# Patient Record
Sex: FEMALE | Race: WHITE | NOT HISPANIC OR LATINO | Employment: UNEMPLOYED | URBAN - METROPOLITAN AREA
[De-identification: names, ages, dates, MRNs, and addresses within clinical notes are randomized per-mention and may not be internally consistent; named-entity substitution may affect disease eponyms.]

---

## 2017-11-21 ENCOUNTER — HISTORICAL (OUTPATIENT)
Dept: LAB | Facility: HOSPITAL | Age: 57
End: 2017-11-21

## 2017-11-21 LAB
ALBUMIN SERPL-MCNC: 4.1 GM/DL (ref 3.4–5)
ALBUMIN/GLOB SERPL: 1.1 RATIO (ref 1.1–2)
ALP SERPL-CCNC: 95 UNIT/L (ref 46–116)
ALT SERPL-CCNC: 129 UNIT/L (ref 12–78)
AST SERPL-CCNC: 222 UNIT/L (ref 10–37)
BILIRUB SERPL-MCNC: 0.5 MG/DL (ref 0.2–1)
BILIRUBIN DIRECT+TOT PNL SERPL-MCNC: 0.16 MG/DL (ref 0–0.2)
BILIRUBIN DIRECT+TOT PNL SERPL-MCNC: 0.31 MG/DL
BUN SERPL-MCNC: 7 MG/DL (ref 7–18)
CALCIUM SERPL-MCNC: 9.2 MG/DL (ref 8.5–10.1)
CHLORIDE SERPL-SCNC: 94 MMOL/L (ref 98–107)
CHOLEST SERPL-MCNC: 291 MG/DL (ref 50–200)
CHOLEST/HDLC SERPL: 2 {RATIO} (ref 0–5)
CO2 SERPL-SCNC: 22.6 MMOL/L (ref 21–32)
CREAT SERPL-MCNC: 1.07 MG/DL (ref 0.55–1.02)
GLOBULIN SER-MCNC: 3.8 GM/DL (ref 2.4–3.5)
GLUCOSE SERPL-MCNC: 144 MG/DL (ref 74–106)
HDLC SERPL-MCNC: 158 MG/DL (ref 35–60)
LDLC SERPL CALC-MCNC: 112 MG/DL (ref 50–140)
POTASSIUM SERPL-SCNC: 4.2 MMOL/L (ref 3.5–5.1)
PROT SERPL-MCNC: 7.9 GM/DL (ref 6.4–8.2)
SODIUM SERPL-SCNC: 132 MMOL/L (ref 136–145)
TRIGL SERPL-MCNC: 105 MG/DL (ref 30–150)
TSH SERPL-ACNC: 2.83 MIU/ML (ref 0.35–3.75)
VLDLC SERPL CALC-MCNC: 21 MG/DL

## 2017-11-24 ENCOUNTER — HISTORICAL (OUTPATIENT)
Dept: RADIOLOGY | Facility: HOSPITAL | Age: 57
End: 2017-11-24

## 2017-11-24 LAB
ABS NEUT (OLG): 6.13
ALBUMIN SERPL-MCNC: 3.9 GM/DL (ref 3.4–5)
ALP SERPL-CCNC: 100 UNIT/L (ref 46–116)
ALT SERPL-CCNC: 125 UNIT/L (ref 12–78)
AST SERPL-CCNC: 137 UNIT/L (ref 10–37)
BASOPHILS # BLD AUTO: 0.02 X10(3)/MCL
BASOPHILS NFR BLD AUTO: 0.2 %
BILIRUB SERPL-MCNC: 0.7 MG/DL (ref 0.2–1)
BILIRUBIN DIRECT+TOT PNL SERPL-MCNC: 0.24 MG/DL (ref 0–0.2)
BILIRUBIN DIRECT+TOT PNL SERPL-MCNC: 0.44 MG/DL
EOSINOPHIL # BLD AUTO: 0.06 X10(3)/MCL
EOSINOPHIL NFR BLD AUTO: 0.6 %
ERYTHROCYTE [DISTWIDTH] IN BLOOD BY AUTOMATED COUNT: 14 %
EST. AVERAGE GLUCOSE BLD GHB EST-MCNC: 91 MG/DL
HAV IGM SERPL QL IA: NEGATIVE
HBA1C MFR BLD: 4.8 % (ref 4.5–6.2)
HBV CORE IGM SERPL QL IA: NEGATIVE
HBV SURFACE AG SERPL QL IA: NEGATIVE
HCT VFR BLD AUTO: 38.7 % (ref 34–46)
HCV AB SERPL QL IA: NEGATIVE
HEPATITIS PANEL INTERP: NORMAL
HGB BLD-MCNC: 13.9 GM/DL (ref 11.3–15.4)
IMM GRANULOCYTES # BLD AUTO: 0.02 10*3/UL (ref 0–0.1)
IMM GRANULOCYTES NFR BLD AUTO: 0.2 % (ref 0–1)
LYMPHOCYTES # BLD AUTO: 2.32 X10(3)/MCL
LYMPHOCYTES NFR BLD AUTO: 24.1 %
MCH RBC QN AUTO: 35.9 PG (ref 27–33)
MCHC RBC AUTO-ENTMCNC: 35.9 GM/DL (ref 32–35)
MCV RBC AUTO: 100 FL (ref 81–97)
MONOCYTES # BLD AUTO: 1.09 X10(3)/MCL
MONOCYTES NFR BLD AUTO: 11.3 %
NEUTROPHILS # BLD AUTO: 6.13 X10(3)/MCL
NEUTROPHILS NFR BLD AUTO: 63.6 %
PLATELET # BLD AUTO: 252 X10(3)/MCL (ref 151–368)
PMV BLD AUTO: 10 FL
PROT SERPL-MCNC: 7.4 GM/DL (ref 6.4–8.2)
RBC # BLD AUTO: 3.87 X10(6)/MCL (ref 3.9–5)
WBC # SPEC AUTO: 9.64 X10(3)/MCL (ref 3.4–9.2)

## 2017-11-30 ENCOUNTER — HISTORICAL (OUTPATIENT)
Dept: LAB | Facility: HOSPITAL | Age: 57
End: 2017-11-30

## 2017-11-30 LAB
FERRITIN SERPL-MCNC: 362.9 NG/ML (ref 8–388)
TRANSFERRIN SERPL-MCNC: 191 MG/DL (ref 200–360)

## 2018-01-08 ENCOUNTER — HISTORICAL (OUTPATIENT)
Dept: RADIOLOGY | Facility: HOSPITAL | Age: 58
End: 2018-01-08

## 2018-01-08 LAB
ABS NEUT (OLG): 6.25
ALBUMIN SERPL-MCNC: 3.6 GM/DL (ref 3.4–5)
ALBUMIN/GLOB SERPL: 0.9 RATIO (ref 1.1–2)
ALP SERPL-CCNC: 77 UNIT/L (ref 46–116)
ALT SERPL-CCNC: 49 UNIT/L (ref 12–78)
AST SERPL-CCNC: 66 UNIT/L (ref 10–37)
BASOPHILS # BLD AUTO: 0.03 X10(3)/MCL
BASOPHILS NFR BLD AUTO: 0.4 %
BILIRUB SERPL-MCNC: 0.3 MG/DL (ref 0.2–1)
BILIRUBIN DIRECT+TOT PNL SERPL-MCNC: 0.14 MG/DL (ref 0–0.2)
BILIRUBIN DIRECT+TOT PNL SERPL-MCNC: 0.17 MG/DL
BUN SERPL-MCNC: 12 MG/DL (ref 7–18)
CALCIUM SERPL-MCNC: 9.2 MG/DL (ref 8.5–10.1)
CHLORIDE SERPL-SCNC: 82 MMOL/L (ref 98–107)
CO2 SERPL-SCNC: 24 MMOL/L (ref 21–32)
CREAT SERPL-MCNC: 1.08 MG/DL (ref 0.55–1.02)
EOSINOPHIL # BLD AUTO: 0 X10(3)/MCL
EOSINOPHIL NFR BLD AUTO: 0 %
ERYTHROCYTE [DISTWIDTH] IN BLOOD BY AUTOMATED COUNT: 12 %
GLOBULIN SER-MCNC: 3.9 GM/DL (ref 2.4–3.5)
GLUCOSE SERPL-MCNC: 135 MG/DL (ref 74–106)
HCT VFR BLD AUTO: 40.4 % (ref 34–46)
HGB BLD-MCNC: 14.7 GM/DL (ref 11.3–15.4)
IMM GRANULOCYTES # BLD AUTO: 0.02 10*3/UL (ref 0–0.1)
IMM GRANULOCYTES NFR BLD AUTO: 0.2 % (ref 0–1)
INFLUENZA A ANTIGEN, POC: NEGATIVE
INFLUENZA B ANTIGEN, POC: NEGATIVE
LYMPHOCYTES # BLD AUTO: 1.01 X10(3)/MCL
LYMPHOCYTES NFR BLD AUTO: 12.2 %
MCH RBC QN AUTO: 35.5 PG (ref 27–33)
MCHC RBC AUTO-ENTMCNC: 36.4 GM/DL (ref 32–35)
MCV RBC AUTO: 97.6 FL (ref 81–97)
MONOCYTES # BLD AUTO: 0.99 X10(3)/MCL
MONOCYTES NFR BLD AUTO: 11.9 %
NEUTROPHILS # BLD AUTO: 6.25 X10(3)/MCL
NEUTROPHILS NFR BLD AUTO: 75.3 %
PLATELET # BLD AUTO: 135 X10(3)/MCL (ref 151–368)
PMV BLD AUTO: 11 FL
POTASSIUM SERPL-SCNC: 3.6 MMOL/L (ref 3.5–5.1)
PROT SERPL-MCNC: 7.5 GM/DL (ref 6.4–8.2)
RBC # BLD AUTO: 4.14 X10(6)/MCL (ref 3.9–5)
SODIUM SERPL-SCNC: 118 MMOL/L (ref 136–145)
VIT B12 SERPL-MCNC: 730 PG/ML (ref 193–986)
WBC # SPEC AUTO: 8.3 X10(3)/MCL (ref 3.4–9.2)

## 2018-01-09 ENCOUNTER — HISTORICAL (OUTPATIENT)
Dept: LAB | Facility: HOSPITAL | Age: 58
End: 2018-01-09

## 2018-01-09 LAB
BUN SERPL-MCNC: 8 MG/DL (ref 7–18)
CALCIUM SERPL-MCNC: 8.7 MG/DL (ref 8.5–10.1)
CHLORIDE SERPL-SCNC: 94 MMOL/L (ref 98–107)
CO2 SERPL-SCNC: 22.2 MMOL/L (ref 21–32)
CREAT SERPL-MCNC: 0.9 MG/DL (ref 0.55–1.02)
GLUCOSE SERPL-MCNC: 130 MG/DL (ref 74–106)
POTASSIUM SERPL-SCNC: 3.7 MMOL/L (ref 3.5–5.1)
SODIUM SERPL-SCNC: 128 MMOL/L (ref 136–145)

## 2018-01-11 ENCOUNTER — HISTORICAL (OUTPATIENT)
Dept: LAB | Facility: HOSPITAL | Age: 58
End: 2018-01-11

## 2018-01-11 LAB
ABS NEUT (OLG): 6.15
BUN SERPL-MCNC: 6 MG/DL (ref 7–18)
BUN SERPL-MCNC: 6 MG/DL (ref 7–18)
CALCIUM SERPL-MCNC: 9 MG/DL (ref 8.5–10.1)
CALCIUM SERPL-MCNC: 9.1 MG/DL (ref 8.5–10.1)
CHLORIDE SERPL-SCNC: 96 MMOL/L (ref 98–107)
CHLORIDE SERPL-SCNC: 98 MMOL/L (ref 98–107)
CHOLEST SERPL-MCNC: 167 MG/DL (ref 50–200)
CHOLEST/HDLC SERPL: 2 {RATIO} (ref 0–5)
CO2 SERPL-SCNC: 24.9 MMOL/L (ref 21–32)
CO2 SERPL-SCNC: 25.5 MMOL/L (ref 21–32)
CREAT SERPL-MCNC: 0.86 MG/DL (ref 0.55–1.02)
CREAT SERPL-MCNC: 0.91 MG/DL (ref 0.55–1.02)
ERYTHROCYTE [DISTWIDTH] IN BLOOD BY AUTOMATED COUNT: 13 %
GLUCOSE SERPL-MCNC: 171 MG/DL (ref 74–106)
GLUCOSE SERPL-MCNC: 91 MG/DL (ref 74–106)
GRANULOCYTES NFR BLD MANUAL: 78 % (ref 47–80)
HCT VFR BLD AUTO: 38.8 % (ref 34–46)
HDLC SERPL-MCNC: 84 MG/DL (ref 35–60)
HGB BLD-MCNC: 13.4 GM/DL (ref 11.3–15.4)
LDLC SERPL CALC-MCNC: 61.4 MG/DL (ref 50–140)
LYMPHOCYTES NFR BLD MANUAL: 13 % (ref 13–40)
MACROCYTES BLD QL SMEAR: NORMAL
MCH RBC QN AUTO: 35.4 PG (ref 27–33)
MCHC RBC AUTO-ENTMCNC: 34.5 GM/DL (ref 32–35)
MCV RBC AUTO: 102.6 FL (ref 81–97)
MONOCYTES NFR BLD MANUAL: 9 % (ref 2–11)
PLATELET # BLD AUTO: 220 X10(3)/MCL (ref 151–368)
PLATELET # BLD EST: ADEQUATE 10*3/UL
PMV BLD AUTO: 10 FL
POTASSIUM SERPL-SCNC: 2.8 MMOL/L (ref 3.5–5.1)
POTASSIUM SERPL-SCNC: 3.7 MMOL/L (ref 3.5–5.1)
RBC # BLD AUTO: 3.78 X10(6)/MCL (ref 3.9–5)
SODIUM SERPL-SCNC: 133 MMOL/L (ref 136–145)
SODIUM SERPL-SCNC: 135 MMOL/L (ref 136–145)
SPHEROCYTES BLD QL SMEAR: NORMAL
TRIGL SERPL-MCNC: 108 MG/DL (ref 30–150)
VLDLC SERPL CALC-MCNC: 22 MG/DL
WBC # SPEC AUTO: 7.77 X10(3)/MCL (ref 3.4–9.2)

## 2018-01-24 ENCOUNTER — HISTORICAL (OUTPATIENT)
Dept: LAB | Facility: HOSPITAL | Age: 58
End: 2018-01-24

## 2018-01-24 LAB
BUN SERPL-MCNC: 4 MG/DL (ref 7–18)
CALCIUM SERPL-MCNC: 9.1 MG/DL (ref 8.5–10.1)
CHLORIDE SERPL-SCNC: 103 MMOL/L (ref 98–107)
CO2 SERPL-SCNC: 23.1 MMOL/L (ref 21–32)
CREAT SERPL-MCNC: 0.64 MG/DL (ref 0.55–1.02)
GLUCOSE SERPL-MCNC: 88 MG/DL (ref 74–106)
POTASSIUM SERPL-SCNC: 4.4 MMOL/L (ref 3.5–5.1)
SODIUM SERPL-SCNC: 138 MMOL/L (ref 136–145)

## 2018-02-05 ENCOUNTER — HISTORICAL (OUTPATIENT)
Dept: RADIOLOGY | Facility: HOSPITAL | Age: 58
End: 2018-02-05

## 2018-02-21 ENCOUNTER — HISTORICAL (OUTPATIENT)
Dept: RADIOLOGY | Facility: HOSPITAL | Age: 58
End: 2018-02-21

## 2018-10-23 LAB
HUMAN PAPILLOMAVIRUS (HPV): NORMAL
PAP RECOMMENDATION EXT: NORMAL
PAP SMEAR: NORMAL

## 2019-04-18 ENCOUNTER — HISTORICAL (OUTPATIENT)
Dept: LAB | Facility: HOSPITAL | Age: 59
End: 2019-04-18

## 2019-04-24 ENCOUNTER — HISTORICAL (OUTPATIENT)
Dept: LAB | Facility: HOSPITAL | Age: 59
End: 2019-04-24

## 2019-05-04 ENCOUNTER — HOSPITAL ENCOUNTER (OUTPATIENT)
Dept: MEDSURG UNIT | Facility: HOSPITAL | Age: 59
End: 2019-05-05
Attending: OTOLARYNGOLOGY | Admitting: OTOLARYNGOLOGY

## 2019-09-06 ENCOUNTER — HISTORICAL (OUTPATIENT)
Dept: LAB | Facility: HOSPITAL | Age: 59
End: 2019-09-06

## 2019-09-13 ENCOUNTER — HISTORICAL (OUTPATIENT)
Dept: RADIOLOGY | Facility: HOSPITAL | Age: 59
End: 2019-09-13

## 2020-07-27 ENCOUNTER — HISTORICAL (OUTPATIENT)
Dept: LAB | Facility: HOSPITAL | Age: 60
End: 2020-07-27

## 2020-07-29 ENCOUNTER — HISTORICAL (OUTPATIENT)
Dept: LAB | Facility: HOSPITAL | Age: 60
End: 2020-07-29

## 2020-10-29 ENCOUNTER — HISTORICAL (OUTPATIENT)
Dept: LAB | Facility: HOSPITAL | Age: 60
End: 2020-10-29

## 2020-11-24 ENCOUNTER — HISTORICAL (OUTPATIENT)
Dept: RADIOLOGY | Facility: HOSPITAL | Age: 60
End: 2020-11-24

## 2020-11-24 LAB — BMD RECOMMENDATION EXT: NORMAL

## 2020-11-25 ENCOUNTER — HISTORICAL (OUTPATIENT)
Dept: LAB | Facility: HOSPITAL | Age: 60
End: 2020-11-25

## 2020-11-30 ENCOUNTER — HISTORICAL (OUTPATIENT)
Dept: RADIOLOGY | Facility: HOSPITAL | Age: 60
End: 2020-11-30

## 2020-12-14 ENCOUNTER — HISTORICAL (OUTPATIENT)
Dept: RADIOLOGY | Facility: HOSPITAL | Age: 60
End: 2020-12-14

## 2021-04-29 ENCOUNTER — HISTORICAL (OUTPATIENT)
Dept: LAB | Facility: HOSPITAL | Age: 61
End: 2021-04-29

## 2021-04-29 LAB
ABS NEUT (OLG): 5.56
BASOPHILS # BLD AUTO: 0.01 X10(3)/MCL
BASOPHILS NFR BLD AUTO: 0.1 %
BUN SERPL-MCNC: 7 MG/DL (ref 9.8–20.1)
CALCIUM SERPL-MCNC: 9.5 MG/DL (ref 8.4–10.2)
CHLORIDE SERPL-SCNC: 100 MMOL/L (ref 98–107)
CO2 SERPL-SCNC: 26 MEQ/L (ref 23–31)
CREAT SERPL-MCNC: 0.77 MG/DL (ref 0.55–1.02)
CREAT/UREA NIT SERPL: 9
EOSINOPHIL # BLD AUTO: 0.13 X10(3)/MCL
EOSINOPHIL NFR BLD AUTO: 1.4 %
ERYTHROCYTE [DISTWIDTH] IN BLOOD BY AUTOMATED COUNT: 12 %
EST. AVERAGE GLUCOSE BLD GHB EST-MCNC: 94 MG/DL
GLUCOSE SERPL-MCNC: 139 MG/DL (ref 82–115)
HBA1C MFR BLD: 4.9 % (ref 4–6)
HCT VFR BLD AUTO: 37.5 % (ref 34–46)
HGB BLD-MCNC: 12.3 GM/DL (ref 11.3–15.4)
IMM GRANULOCYTES # BLD AUTO: 0.02 10*3/UL (ref 0–0.1)
IMM GRANULOCYTES NFR BLD AUTO: 0.2 % (ref 0–1)
LYMPHOCYTES # BLD AUTO: 2.93 X10(3)/MCL
LYMPHOCYTES NFR BLD AUTO: 31.2 %
MCH RBC QN AUTO: 31.6 PG (ref 27–33)
MCHC RBC AUTO-ENTMCNC: 32.8 GM/DL (ref 32–35)
MCV RBC AUTO: 96.4 FL (ref 81–97)
MONOCYTES # BLD AUTO: 0.74 X10(3)/MCL
MONOCYTES NFR BLD AUTO: 7.9 %
NEUTROPHILS # BLD AUTO: 5.56 X10(3)/MCL
NEUTROPHILS NFR BLD AUTO: 59.2 %
PLATELET # BLD AUTO: 343 X10(3)/MCL (ref 140–450)
PMV BLD AUTO: 8 FL
POTASSIUM SERPL-SCNC: 4.7 MMOL/L (ref 3.5–5.1)
RBC # BLD AUTO: 3.89 X10(6)/MCL (ref 3.9–5)
SODIUM SERPL-SCNC: 135 MMOL/L (ref 136–145)
WBC # SPEC AUTO: 9.39 X10(3)/MCL (ref 3.4–9.2)

## 2021-11-22 ENCOUNTER — HISTORICAL (OUTPATIENT)
Dept: LAB | Facility: HOSPITAL | Age: 61
End: 2021-11-22

## 2022-01-28 ENCOUNTER — HISTORICAL (OUTPATIENT)
Dept: RADIOLOGY | Facility: HOSPITAL | Age: 62
End: 2022-01-28

## 2022-01-28 ENCOUNTER — HISTORICAL (OUTPATIENT)
Dept: ADMINISTRATIVE | Facility: HOSPITAL | Age: 62
End: 2022-01-28

## 2022-04-09 ENCOUNTER — HISTORICAL (OUTPATIENT)
Dept: ADMINISTRATIVE | Facility: HOSPITAL | Age: 62
End: 2022-04-09
Payer: MEDICAID

## 2022-04-24 ENCOUNTER — HISTORICAL (OUTPATIENT)
Dept: ADMINISTRATIVE | Facility: HOSPITAL | Age: 62
End: 2022-04-24
Payer: MEDICAID

## 2022-04-25 VITALS
WEIGHT: 138.44 LBS | SYSTOLIC BLOOD PRESSURE: 108 MMHG | DIASTOLIC BLOOD PRESSURE: 72 MMHG | HEIGHT: 64 IN | BODY MASS INDEX: 23.64 KG/M2 | OXYGEN SATURATION: 97 %
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
## 2022-04-30 NOTE — ED PROVIDER NOTES
Patient:   Andree Mcclelland            MRN: [id number]            FIN: [id number]               Age:   58 years     Sex:  Female     :  1960   Associated Diagnoses:   Epistaxis   Author:   Klaus SANDS, Kayy Zhang      Basic Information   Time seen: Date & time 2019 11:06:00.   History source: Patient.   Arrival mode: Ambulance.   History limitation: None.   Additional information: Chief Complaint from Nursing Triage Note : Chief Complaint   2019 10:30 CDT       Chief Complaint           Pt c/o nose bleed started at 2AM pt was seen at [facility] transferred for ENT due to persistant nose bleed.  Pt denies blood thinners.  Hx of htn.    2019 9:17 CDT        Chief Complaint           C/O recurrent nosebleeds after previous visit this AM    2019 6:40 CDT        Chief Complaint           nosebleed since 0200  .      History of Present Illness   The patient presents with 57 y/o CF presents to the ED via EMS transferred from [facility] for ENT d/t epistaxis that began at 0200 today. Pt's left naris was packed. Upon arrival home she bent down and epistaxis began again on the right side. Pt reports when titling head back, she feels the blood draining into her throat which makes her want to vomit.  She is not on anticoagulation therapy and denies any nasal trauma.  She has not taken her blood pressure medication this morning.  She is not bleeding anywhere else.  No associated chest pain or shortness of breath.  Otherwise in her usual state of health.  The onset was 2019  (@ 0200).  The course/duration of symptoms is constant.  Location: Right nare. Type of injury: none.  The degree at onset was moderate.  The degree at present is moderate.  Risk factors consist of hypertension and not anticoagulated.  Prior episodes: none.  Therapy today: emergency medical services and transferred from another facility ([facility]).  Associated symptoms: swallowing blood, denies dyspnea and denies syncope.         Review of Systems   Constitutional symptoms:  No fever,    Skin symptoms:  No rash,    Eye symptoms:  No recent vision problems,    ENMT symptoms:  No sore throat, Nose: Bleeding.   Respiratory symptoms:  No shortness of breath, no cough.    Cardiovascular symptoms:  No chest pain, no syncope.    Gastrointestinal symptoms:  No abdominal pain, no nausea, no vomiting, no diarrhea, no rectal bleeding.    Genitourinary symptoms:  No dysuria, no hematuria, no vaginal bleeding.    Neurologic symptoms:  No headache, no dizziness.       Health Status   Medications:  (Selected)   Inpatient Medications  Ordered  Normal Saline (0.9% NS) IV: 1,000 mL, 1,000 mL, IV, 1000 mL/hr, start date 05/04/19 9:25:00 CDT, stop date 05/04/19 9:25:00 CDT, STAT  Prescriptions  Prescribed  Ayr Saline Mist 0.65% nasal spray: 1 spray(s), Nasal, QID, PRN PRN as needed for dry nasal passages, in each nostril, # 44 mL, 0 Refill(s), Pharmacy: [facility]  Chantix Starter Pack 0.5 mg-1 mg oral tablet: 1 tab(s), Oral, BID, as directed on package labeling, # 53 tab(s), 0 Refill(s), Pharmacy: [facility]  Zofran 8 mg oral tablet: 8 mg = 1 tab(s), Oral, q6hr, # 30 tab(s), 1 Refill(s), Pharmacy: [facility]  albuterol 90 mcg/inh inhalation aerosol: 90 mcg = 1 puff(s), INH, q4hr, PRN PRN for wheezing, # 1 EA, 6 Refill(s), Pharmacy: [facility]  alendronate 70 mg oral tablet: See Instructions, TAKE ONE TABLET BY MOUTH ONCE A WEEK, # 4 unknown unit, 3 Refill(s), eRx: [facility]  lovastatin 40 mg oral tablet: See Instructions, TAKE ONE TABLET BY MOUTH EVERY DAY, # 30 tab(s), 5 Refill(s), Pharmacy: [facility]  quinapril 40 mg oral tablet: 40 mg = 1 tab(s), Oral, Daily, # 30 tab(s), 5 Refill(s), Pharmacy: [facility]  Documented Medications  Documented  Aspir 81 oral delayed release tablet: 81 mg = 1 tab(s), Oral, Daily, # 30  tab(s), 0 Refill(s)  B-12 Resin 1000 mcg oral tablet: 1,000 mcg = 1 tab(s), Oral, Daily, # 30 tab(s), 0 Refill(s)  Vitamin D3 2000 intl units oral capsule: 2,000 IntUnit = 1 cap(s), Oral, Daily, 0 Refill(s)  Zyrtec 10 mg oral tablet: 10 mg = 1 tab(s), Oral, Daily, # 30 tab(s), 0 Refill(s)  amoxicillin 500 mg oral tablet: 500 mg = 1 tab(s), Oral, BID, # 20 tab(s), 0 Refill(s).      Past Medical/ Family/ Social History   Medical history:   Hyponatremia   Tobacco user .   Surgical history:    Pap smear and HPV cotesting ([id number]) on 10/23/2018 at 57 Years.  Mammogram ([id number]) on 2/5/2018 at 57 Years.  Tonsillectomy ([id number]) in 1963 at 3 Years.  PINS procedure ([id number])..   Family history:    Alzheimer's disease  Mother  .   Social history: Alcohol use: Occasionally, Tobacco use: Regularly, Drug use: Denies.      Physical Examination               Vital Signs   Vital Signs   5/4/2019 10:30 CDT       Temperature Oral          36.7 DegC                             Temperature Oral (calculated)             98.06 DegF                             Peripheral Pulse Rate     111 bpm  HI                             Respiratory Rate          18 br/min                             SpO2                      99 %                             Oxygen Therapy            Room air                             Systolic Blood Pressure   144 mmHg  HI                             Diastolic Blood Pressure  86 mmHg    5/4/2019 9:38 CDT        Temperature Oral          37.1 DegC                             Temperature Oral (calculated)             98.78 DegF                             Peripheral Pulse Rate     124 bpm  HI                             Respiratory Rate          18 br/min                             Systolic Blood Pressure   132 mmHg                             Diastolic Blood Pressure  96 mmHg  HI                             Mean Arterial Pressure, Cuff              108 mmHg    5/4/2019 9:17 CDT        Temperature  Oral          37.1 DegC                             Temperature Oral (calculated)             98.78 DegF                             Peripheral Pulse Rate     133 bpm  HI                             Respiratory Rate          18 br/min                             SpO2                      99 %                             Saturation Probe Site     Hand, left                             Oxygen Therapy            Room air                             Systolic Blood Pressure   152 mmHg  HI                             Diastolic Blood Pressure  110 mmHg  HI                             Mean Arterial Pressure, Cuff              124 mmHg                             Blood Pressure Location   Right arm                             Blood Pressure Cuff Size  Adult    5/4/2019 7:33 CDT        Systolic Blood Pressure   160 mmHg  HI                             Diastolic Blood Pressure  94 mmHg  HI                             Mean Arterial Pressure, Cuff              116 mmHg    5/4/2019 6:40 CDT        Temperature Oral          37.0 DegC                             Temperature Oral (calculated)             98.60 DegF                             Peripheral Pulse Rate     120 bpm  HI                             Respiratory Rate          20 br/min                             SpO2                      98 %                             Saturation Probe Site     Hand, right                             Oxygen Therapy            Room air                             Systolic Blood Pressure   163 mmHg  HI                             Diastolic Blood Pressure  98 mmHg  HI                             Mean Arterial Pressure, Cuff              120 mmHg                             Blood Pressure Location   Right arm                             Blood Pressure Cuff Size  Adult  .      Vital Signs (last 24 hrs)_____  Last Charted___________  Temp Oral     36.7 DegC  (MAY 04 10:30)  Heart Rate Peripheral   H 111bpm  (MAY 04 10:30)  Resp Rate         18  br/min  (MAY 04 10:30)  SBP      H 144mmHg  (MAY 04 10:30)  DBP      86 mmHg  (MAY 04 10:30)  SpO2      99 %  (MAY 04 10:30).   Measurements   5/4/2019 10:30 CDT       Weight Dosing             52 kg                             Weight Measured and Calculated in Lbs     114.64 lb                             Weight Estimated          52 kg                             Height/Length Dosing      162 cm                             Height/Length Estimated   162 cm                             Body Mass Index Estimated 19.81 kg/m2    5/4/2019 9:17 CDT        Weight Dosing             52.3 kg                             Weight Measured and Calculated in Lbs     115.30 lb                             Weight Estimated          52.3 kg                             Height/Length Dosing      162 cm                             Height/Length Estimated   162 cm                             Body Mass Index Estimated 19.93 kg/m2    5/4/2019 6:40 CDT        Weight Dosing             52.3 kg                             Weight Measured and Calculated in Lbs     115.30 lb                             Weight Estimated          52.3 kg                             Height/Length Dosing      162 cm                             Height/Length Estimated   162 cm                             Body Mass Index Estimated 19.93 kg/m2  .   Basic Oxygen Information   5/4/2019 10:30 CDT       SpO2                      99 %                             Oxygen Therapy            Room air    5/4/2019 9:17 CDT        SpO2                      99 %                             Oxygen Therapy            Room air    5/4/2019 6:40 CDT        SpO2                      98 %                             Oxygen Therapy            Room air  .   General:  Alert, no acute distress, anxious.    Skin:  Warm, dry, intact, no pallor, no rash, normal for ethnicity.    Head:  Normocephalic, atraumatic.    Neck:  Supple, trachea midline.    Eye:  Extraocular movements are intact, normal  conjunctiva.    Ears, nose, mouth and throat:  Oral mucosa moist, blood noted to the posterior pharynx, Nose: Left naris, moderate, bleeding, no active bleeding to the left naris.    Cardiovascular:  Regular rate and rhythm, Normal peripheral perfusion.    Respiratory:  Lungs are clear to auscultation, respirations are non-labored, breath sounds are equal, Symmetrical chest wall expansion.    Gastrointestinal:  Soft, Nontender, Non distended.    Back:  Normal range of motion.   Musculoskeletal:  Normal ROM.   Neurological:  Alert and oriented to person, place, time, and situation, No focal neurological deficit observed, normal motor observed.    Psychiatric:  Cooperative.      Medical Decision Making   Differential Diagnosis:  Anterior epistaxis, posterior epistaxis, hypertension, anemia.    Rationale:  59 yo F with history of HTN presents for ENT evaluation of recurrent epistaxis.  She reportedly had nosebleed that began at 2 AM this morning.  She was seen at an outside facility where packing was placed.  She was subsequently discharged with follow-up, however returned to the emergency department at the outlying facility for recurrent bleeding.  Per report, bleeding was brisk. Packing removed, pressure applied and the patient was transferred.  Currently bleeding remains brisk from the left nostril.  I also see bleeding in the posterior pharynx. A 5.5 cm rhinorocket was available and was placed in the left naris until resistance met with improvement in bleeding. Dr. Pressley consulted and agrees to admission for observation overnight with repeat CBC in the morning. He does request to pack the other nostril. A 7.5cm rhinorocket was found and placed until resistance met to the left naris. Patient tolerated both well, bleeding presently controlled. No blood to the posterior pharynx at this time. Rocephin initiated. She is amenable to admission. .   Documents reviewed:  Emergency department nurses' notes.   Orders   Laboratory    CBC wo/Diff, Klaus SANDS, Kayy Zhang, 05/04/19, 10:39, Completed    PT (Protime), Klaus SANDS, Kayy Zhang, 05/04/19, 10:41, Completed    PTT, Klaus SANDS, Kayy Zhang, 05/04/19, 10:41, Completed    BMP, Klaus SANDS, Kayy Zhang, 05/04/19, 10:41, Completed    Estimated Glomerular Filtration Rate, Klaus SANDS, Kayy Zhang, 05/04/19, 11:33, Completed.   Results review:  Lab results : Lab View   5/4/2019 10:52 CDT       Sodium Lvl                138 mmol/L                             Potassium Lvl             4.3 mmol/L                             Chloride                  103 mmol/L                             CO2                       26.0 mmol/L                             Calcium Lvl               9.1 mg/dL                             Glucose Lvl               114 mg/dL  HI                             BUN                       12.0 mg/dL                             Creatinine                0.87 mg/dL                             BUN/Creat Ratio           13.8  NA                             eGFR-AA                   >60 mL/min/1.73 m2  NA                             eGFR-MAITE                  >60 mL/min/1.73 m2  NA                             PT                        13.5 second(s)                             INR                       1.0                             PTT                       29.3 second(s)                             WBC                       12.2 x10(3)/mcL  HI                             RBC                       3.81 x10(6)/mcL  LOW                             Hgb                       12.9 gm/dL                             Hct                       39.2 %                             Platelet                  322 x10(3)/mcL                             MCV                       102.9 fL  HI                             MCH                       33.9 pg  HI                             MCHC                      32.9 gm/dL  LOW                             RDW                        13.9 %                             MPV                       9.3 fL  LOW    5/4/2019 9:35 CDT        WBC                       14.23 x10(3)/mcL  HI                             RBC                       4.06 x10(6)/mcL                             Hgb                       13.6 gm/dL                             Hct                       40.4 %                             Platelet                  350 x10(3)/mcL                             MCV                       99.5 fL  HI                             MCH                       33.5 pg  HI                             MCHC                      33.7 gm/dL                             RDW                       14  NA                             MPV                       9  NA                             Abs Neut                  10.11  NA                             Neutro Auto               71.1 %  NA                             Lymph Auto                20.9 %  NA                             Mono Auto                 7.2 %  NA                             Eos Auto                  0.4 %  NA                             Abs Eos                   0.06 x10(3)/mcL  NA                             Basophil Auto             0.2 %  NA                             Abs Neutro                10.11 x10(3)/mcL  NA                             Abs Lymph                 2.97 x10(3)/mcL  NA                             Abs Mono                  1.03 x10(3)/mcL  NA                             Abs Baso                  0.03 x10(3)/mcL  NA                             IG%                       0.2 %                             IG#                       0.0300  .      Reexamination/ Reevaluation   Time: 5/4/2019 12:40:00 .   Vital signs   Basic Oxygen Information   5/4/2019 10:30 CDT       SpO2                      99 %                             Oxygen Therapy            Room air    5/4/2019 9:17 CDT        SpO2                      99 %                             Oxygen Therapy             Room air    5/4/2019 6:40 CDT        SpO2                      98 %                             Oxygen Therapy            Room air     Course: improving.      Procedure   Nose bleed control procedure   Time: 5/4/2019 12:10:00 .    Confirmed: Patient, procedure, side, and site correct.    Consent: Patient, Has given verbal consent.    Location of bleeding: Bilateral nasal canal, Posteriorly.    Preparation: External pressure, Afrin.       Description   Packing: bilateral, nasal rocket.   Post procedure bleeding: Minimal.    Patient tolerated: Well.    Complications: None.    Performed by: Self.    Total time: 10 minutes.       Impression and Plan   Diagnosis   Epistaxis (PNED [id number])   recurrent epistaxis      Calls-Consults   -  5/4/2019 12:33:00 , Wendie SANDS, Dakota BHATTI, recommends admit to observation, pack both nostrils, clear diet, Rocephin.    Plan   Condition: Improved, Stable.    Disposition: Admit time  5/4/2019 12:38:00, Place in Observation Telemetry Unit, Dakota Pressley MD.    Counseled: Patient, Family, Regarding diagnosis, Regarding diagnostic results, Regarding treatment plan, Patient indicated understanding of instructions.    Notes: I, Bridgette Kahn, acted solely as a scribe for and in the presence of Dr. Enrique who performed the service., I, Dr. Kayy Enrique, personally evaluated and examined this patient and agree with the documentation as above. .

## 2022-05-20 ENCOUNTER — OFFICE VISIT (OUTPATIENT)
Dept: ORTHOPEDICS | Facility: CLINIC | Age: 62
End: 2022-05-20
Payer: MEDICAID

## 2022-05-20 ENCOUNTER — HOSPITAL ENCOUNTER (OUTPATIENT)
Dept: RADIOLOGY | Facility: HOSPITAL | Age: 62
Discharge: HOME | End: 2022-05-20
Attending: STUDENT IN AN ORGANIZED HEALTH CARE EDUCATION/TRAINING PROGRAM
Payer: MEDICAID

## 2022-05-20 VITALS
OXYGEN SATURATION: 95 % | RESPIRATION RATE: 16 BRPM | TEMPERATURE: 98 F | BODY MASS INDEX: 23.92 KG/M2 | WEIGHT: 135 LBS | HEART RATE: 104 BPM | SYSTOLIC BLOOD PRESSURE: 133 MMHG | HEIGHT: 63 IN | DIASTOLIC BLOOD PRESSURE: 80 MMHG

## 2022-05-20 DIAGNOSIS — S42.002A FRACTURE OF UNSPECIFIED PART OF LEFT CLAVICLE, INITIAL ENCOUNTER FOR CLOSED FRACTURE: ICD-10-CM

## 2022-05-20 DIAGNOSIS — F17.200 TOBACCO DEPENDENCE: ICD-10-CM

## 2022-05-20 DIAGNOSIS — S42.002A FRACTURE OF UNSPECIFIED PART OF LEFT CLAVICLE, INITIAL ENCOUNTER FOR CLOSED FRACTURE: Primary | ICD-10-CM

## 2022-05-20 PROCEDURE — 99204 PR OFFICE/OUTPT VISIT, NEW, LEVL IV, 45-59 MIN: ICD-10-PCS | Mod: S$PBB,,, | Performed by: STUDENT IN AN ORGANIZED HEALTH CARE EDUCATION/TRAINING PROGRAM

## 2022-05-20 PROCEDURE — 99215 OFFICE O/P EST HI 40 MIN: CPT | Mod: PBBFAC | Performed by: STUDENT IN AN ORGANIZED HEALTH CARE EDUCATION/TRAINING PROGRAM

## 2022-05-20 PROCEDURE — 99204 OFFICE O/P NEW MOD 45 MIN: CPT | Mod: S$PBB,,, | Performed by: STUDENT IN AN ORGANIZED HEALTH CARE EDUCATION/TRAINING PROGRAM

## 2022-05-20 PROCEDURE — 73000 X-RAY EXAM OF COLLAR BONE: CPT | Mod: TC,LT

## 2022-05-20 RX ORDER — NABUMETONE 500 MG/1
500 TABLET, FILM COATED ORAL 2 TIMES DAILY PRN
Qty: 60 TABLET | Refills: 0 | Status: SHIPPED | OUTPATIENT
Start: 2022-05-20 | End: 2022-06-19

## 2022-05-20 RX ORDER — ALBUTEROL SULFATE 90 UG/1
AEROSOL, METERED RESPIRATORY (INHALATION)
COMMUNITY
Start: 2022-04-18 | End: 2022-11-28 | Stop reason: SDUPTHER

## 2022-05-20 RX ORDER — CALCIUM CARBONATE 500(1250)
500 TABLET,CHEWABLE ORAL
COMMUNITY
Start: 2021-04-30

## 2022-05-20 RX ORDER — CYCLOBENZAPRINE HCL 10 MG
5 TABLET ORAL 2 TIMES DAILY
Qty: 15 TABLET | Refills: 0 | Status: SHIPPED | OUTPATIENT
Start: 2022-05-20 | End: 2022-06-30
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
## 2022-05-23 DIAGNOSIS — Z00.00 WELLNESS EXAMINATION: Primary | ICD-10-CM

## 2022-05-23 PROBLEM — F32.5 MAJOR DEPRESSION IN REMISSION: Status: ACTIVE | Noted: 2022-05-23

## 2022-05-23 PROBLEM — J43.9 PULMONARY EMPHYSEMA: Status: ACTIVE | Noted: 2022-05-23

## 2022-05-23 PROBLEM — I10 HYPERTENSION: Status: ACTIVE | Noted: 2022-05-23

## 2022-05-23 PROBLEM — R19.5 OCCULT BLOOD POSITIVE STOOL: Status: ACTIVE | Noted: 2022-05-23

## 2022-05-23 PROBLEM — M81.0 OSTEOPOROSIS: Status: ACTIVE | Noted: 2022-05-23

## 2022-05-23 PROBLEM — E87.1 CHRONIC HYPONATREMIA: Status: ACTIVE | Noted: 2022-05-23

## 2022-05-23 PROBLEM — E78.5 HYPERLIPIDEMIA: Status: ACTIVE | Noted: 2022-05-23

## 2022-05-23 PROBLEM — F10.20 ALCOHOLISM: Status: ACTIVE | Noted: 2022-05-23

## 2022-05-23 PROBLEM — F41.9 ANXIETY: Status: ACTIVE | Noted: 2022-05-23

## 2022-05-23 PROBLEM — Z72.0 TOBACCO USER: Status: ACTIVE | Noted: 2022-05-23

## 2022-05-23 RX ORDER — VIT C/E/ZN/COPPR/LUTEIN/ZEAXAN 250MG-90MG
1000 CAPSULE ORAL
COMMUNITY
Start: 2021-04-30 | End: 2024-03-04

## 2022-05-23 RX ORDER — MULTIVITAMIN
TABLET ORAL
COMMUNITY
Start: 2021-04-30

## 2022-06-17 ENCOUNTER — OFFICE VISIT (OUTPATIENT)
Dept: ORTHOPEDICS | Facility: CLINIC | Age: 62
End: 2022-06-17
Payer: MEDICAID

## 2022-06-17 ENCOUNTER — HOSPITAL ENCOUNTER (OUTPATIENT)
Dept: RADIOLOGY | Facility: HOSPITAL | Age: 62
Discharge: HOME | End: 2022-06-17
Attending: STUDENT IN AN ORGANIZED HEALTH CARE EDUCATION/TRAINING PROGRAM
Payer: MEDICAID

## 2022-06-17 VITALS
RESPIRATION RATE: 20 BRPM | DIASTOLIC BLOOD PRESSURE: 82 MMHG | HEART RATE: 106 BPM | BODY MASS INDEX: 23.21 KG/M2 | WEIGHT: 131 LBS | SYSTOLIC BLOOD PRESSURE: 136 MMHG | HEIGHT: 63 IN

## 2022-06-17 DIAGNOSIS — S42.018D CLOSED NONDISPLACED FRACTURE OF STERNAL END OF LEFT CLAVICLE WITH ROUTINE HEALING, SUBSEQUENT ENCOUNTER: Primary | ICD-10-CM

## 2022-06-17 DIAGNOSIS — S42.002A FRACTURE OF UNSPECIFIED PART OF LEFT CLAVICLE, INITIAL ENCOUNTER FOR CLOSED FRACTURE: ICD-10-CM

## 2022-06-17 DIAGNOSIS — F17.200 TOBACCO DEPENDENCE: ICD-10-CM

## 2022-06-17 DIAGNOSIS — M62.838 MUSCLE SPASM OF SHOULDER REGION: ICD-10-CM

## 2022-06-17 PROCEDURE — 99214 OFFICE O/P EST MOD 30 MIN: CPT | Mod: S$PBB,,, | Performed by: STUDENT IN AN ORGANIZED HEALTH CARE EDUCATION/TRAINING PROGRAM

## 2022-06-17 PROCEDURE — 73000 X-RAY EXAM OF COLLAR BONE: CPT | Mod: TC,LT

## 2022-06-17 PROCEDURE — 99214 PR OFFICE/OUTPT VISIT, EST, LEVL IV, 30-39 MIN: ICD-10-PCS | Mod: S$PBB,,, | Performed by: STUDENT IN AN ORGANIZED HEALTH CARE EDUCATION/TRAINING PROGRAM

## 2022-06-17 PROCEDURE — 99214 OFFICE O/P EST MOD 30 MIN: CPT | Mod: PBBFAC | Performed by: STUDENT IN AN ORGANIZED HEALTH CARE EDUCATION/TRAINING PROGRAM

## 2022-06-17 RX ORDER — CYCLOBENZAPRINE HCL 10 MG
10 TABLET ORAL NIGHTLY PRN
Qty: 30 TABLET | Refills: 0 | Status: SHIPPED | OUTPATIENT
Start: 2022-06-17 | End: 2022-09-09
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
## 2022-08-22 DIAGNOSIS — H26.9 CATARACT, UNSPECIFIED CATARACT TYPE, UNSPECIFIED LATERALITY: Primary | ICD-10-CM

## 2022-09-09 ENCOUNTER — HOSPITAL ENCOUNTER (EMERGENCY)
Facility: HOSPITAL | Age: 62
Discharge: HOME | End: 2022-09-09
Attending: STUDENT IN AN ORGANIZED HEALTH CARE EDUCATION/TRAINING PROGRAM
Payer: MEDICAID

## 2022-09-09 VITALS
TEMPERATURE: 98 F | HEART RATE: 97 BPM | WEIGHT: 138 LBS | SYSTOLIC BLOOD PRESSURE: 152 MMHG | OXYGEN SATURATION: 100 % | BODY MASS INDEX: 23.56 KG/M2 | HEIGHT: 64 IN | DIASTOLIC BLOOD PRESSURE: 88 MMHG | RESPIRATION RATE: 18 BRPM

## 2022-09-09 DIAGNOSIS — M70.21 OLECRANON BURSITIS OF RIGHT ELBOW: Primary | ICD-10-CM

## 2022-09-09 PROCEDURE — 99282 EMERGENCY DEPT VISIT SF MDM: CPT

## 2022-09-09 RX ORDER — LOVASTATIN 40 MG/1
TABLET ORAL
COMMUNITY
Start: 2022-01-11 | End: 2022-11-28

## 2022-09-09 RX ORDER — ALENDRONATE SODIUM 70 MG/1
TABLET ORAL
COMMUNITY
Start: 2021-09-17 | End: 2022-11-28

## 2022-09-09 RX ORDER — ESCITALOPRAM OXALATE 5 MG/1
TABLET ORAL
COMMUNITY
Start: 2022-01-27 | End: 2022-11-28

## 2022-09-09 RX ORDER — CETIRIZINE HYDROCHLORIDE 10 MG/1
10 TABLET ORAL DAILY
COMMUNITY
Start: 2022-08-19 | End: 2022-11-16

## 2022-09-09 RX ORDER — HYDROXYZINE PAMOATE 25 MG/1
CAPSULE ORAL
COMMUNITY
Start: 2022-01-03 | End: 2022-11-28

## 2022-09-09 RX ORDER — GABAPENTIN 300 MG/1
600 CAPSULE ORAL
COMMUNITY
Start: 2022-04-24 | End: 2022-11-28
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
## 2022-09-15 ENCOUNTER — HISTORICAL (OUTPATIENT)
Dept: ADMINISTRATIVE | Facility: HOSPITAL | Age: 62
End: 2022-09-15
Payer: MEDICAID

## 2022-09-16 ENCOUNTER — OFFICE VISIT (OUTPATIENT)
Dept: OPHTHALMOLOGY | Facility: CLINIC | Age: 62
End: 2022-09-16
Payer: MEDICAID

## 2022-09-16 VITALS — WEIGHT: 138.88 LBS | HEIGHT: 64 IN | BODY MASS INDEX: 23.71 KG/M2

## 2022-09-16 DIAGNOSIS — H26.9 CATARACT, UNSPECIFIED CATARACT TYPE, UNSPECIFIED LATERALITY: ICD-10-CM

## 2022-09-16 PROCEDURE — 99213 OFFICE O/P EST LOW 20 MIN: CPT | Mod: PBBFAC,PO | Performed by: STUDENT IN AN ORGANIZED HEALTH CARE EDUCATION/TRAINING PROGRAM

## 2022-09-16 NOTE — PROGRESS NOTES
HPI    New cat eval for sx under general anesthesia  Last edited by Katalina Denson RN on 9/16/2022  1:52 PM.            Assessment /Plan     For exam results, see Encounter Report.    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 Seen by [facility] and referred to [facility] for cataract surgery  - [facility] today 20/30+2 // 20/20. Did not BAT up.   - Patient reports phobia with surgery so will need to be under general anesthesia when patient is ready for sx  - MRX written today       2) Dry Eye Syndrome  - Start AT 4 times daily     RTC 9 months for symptom check / repeat cat eval?

## 2022-10-17 RX ORDER — HYDROXYZINE PAMOATE 25 MG/1
CAPSULE ORAL
Qty: 120 CAPSULE | Refills: 3 | OUTPATIENT
Start: 2022-10-17

## 2022-10-17 RX ORDER — ALENDRONATE SODIUM 70 MG/1
TABLET ORAL
Qty: 12 TABLET | Refills: 5 | OUTPATIENT
Start: 2022-10-17

## 2022-10-17 RX ORDER — LOVASTATIN 40 MG/1
TABLET ORAL
Qty: 90 TABLET | Refills: 1 | OUTPATIENT
Start: 2022-10-17

## 2022-11-16 DIAGNOSIS — J43.9 EMPHYSEMA, UNSPECIFIED: ICD-10-CM

## 2022-11-16 RX ORDER — CETIRIZINE HYDROCHLORIDE 10 MG/1
TABLET ORAL
Qty: 30 TABLET | Refills: 11 | Status: SHIPPED | OUTPATIENT
Start: 2022-11-16 | End: 2022-11-28 | Stop reason: SDUPTHER

## 2022-11-22 ENCOUNTER — LAB VISIT (OUTPATIENT)
Dept: LAB | Facility: HOSPITAL | Age: 62
End: 2022-11-22
Attending: FAMILY MEDICINE
Payer: MEDICAID

## 2022-11-22 DIAGNOSIS — Z00.00 WELLNESS EXAMINATION: ICD-10-CM

## 2022-11-22 LAB
ALBUMIN SERPL-MCNC: 3.8 GM/DL (ref 3.4–4.8)
ALBUMIN/GLOB SERPL: 1.1 RATIO (ref 1.1–2)
ALP SERPL-CCNC: 92 UNIT/L (ref 40–150)
ALT SERPL-CCNC: 17 UNIT/L (ref 0–55)
APPEARANCE UR: CLEAR
AST SERPL-CCNC: 21 UNIT/L (ref 5–34)
BACTERIA #/AREA URNS AUTO: ABNORMAL /HPF
BASOPHILS # BLD AUTO: 0.04 X10(3)/MCL (ref 0–0.2)
BASOPHILS NFR BLD AUTO: 0.4 %
BILIRUB UR QL STRIP.AUTO: NEGATIVE MG/DL
BILIRUBIN DIRECT+TOT PNL SERPL-MCNC: 0.7 MG/DL
BUN SERPL-MCNC: 5 MG/DL (ref 9.8–20.1)
CALCIUM SERPL-MCNC: 9.8 MG/DL (ref 8.4–10.2)
CHLORIDE SERPL-SCNC: 95 MMOL/L (ref 98–107)
CHOLEST SERPL-MCNC: 211 MG/DL
CHOLEST/HDLC SERPL: 2 {RATIO} (ref 0–5)
CO2 SERPL-SCNC: 26 MMOL/L (ref 23–31)
COLOR UR AUTO: YELLOW
CREAT SERPL-MCNC: 0.83 MG/DL (ref 0.55–1.02)
DEPRECATED CALCIDIOL+CALCIFEROL SERPL-MC: 17.5 NG/ML (ref 30–80)
EOSINOPHIL # BLD AUTO: 0.17 X10(3)/MCL (ref 0–0.9)
EOSINOPHIL NFR BLD AUTO: 1.8 %
ERYTHROCYTE [DISTWIDTH] IN BLOOD BY AUTOMATED COUNT: 15.3 % (ref 11.5–17)
EST. AVERAGE GLUCOSE BLD GHB EST-MCNC: 88.2 MG/DL
GFR SERPLBLD CREATININE-BSD FMLA CKD-EPI: >60 MLS/MIN/1.73/M2
GLOBULIN SER-MCNC: 3.5 GM/DL (ref 2.4–3.5)
GLUCOSE SERPL-MCNC: 99 MG/DL (ref 82–115)
GLUCOSE UR QL STRIP.AUTO: NEGATIVE MG/DL
HBA1C MFR BLD: 4.7 %
HCT VFR BLD AUTO: 38.5 % (ref 37–47)
HDLC SERPL-MCNC: 89 MG/DL (ref 35–60)
HGB BLD-MCNC: 13.3 GM/DL (ref 12–16)
IMM GRANULOCYTES # BLD AUTO: 0.03 X10(3)/MCL (ref 0–0.04)
IMM GRANULOCYTES NFR BLD AUTO: 0.3 %
KETONES UR QL STRIP.AUTO: NEGATIVE MG/DL
LDLC SERPL CALC-MCNC: 105 MG/DL (ref 50–140)
LEUKOCYTE ESTERASE UR QL STRIP.AUTO: NEGATIVE UNIT/L
LYMPHOCYTES # BLD AUTO: 2.23 X10(3)/MCL (ref 0.6–4.6)
LYMPHOCYTES NFR BLD AUTO: 23.5 %
MCH RBC QN AUTO: 32.4 PG (ref 27–31)
MCHC RBC AUTO-ENTMCNC: 34.5 MG/DL (ref 33–36)
MCV RBC AUTO: 93.9 FL (ref 80–94)
MONOCYTES # BLD AUTO: 0.97 X10(3)/MCL (ref 0.1–1.3)
MONOCYTES NFR BLD AUTO: 10.2 %
NEUTROPHILS # BLD AUTO: 6 X10(3)/MCL (ref 2.1–9.2)
NEUTROPHILS NFR BLD AUTO: 63.8 %
NITRITE UR QL STRIP.AUTO: NEGATIVE
NRBC BLD AUTO-RTO: 0 %
PH UR STRIP.AUTO: 6.5 [PH]
PLATELET # BLD AUTO: 270 X10(3)/MCL (ref 130–400)
PMV BLD AUTO: 9.5 FL (ref 7.4–10.4)
POTASSIUM SERPL-SCNC: 4.4 MMOL/L (ref 3.5–5.1)
PROT SERPL-MCNC: 7.3 GM/DL (ref 5.8–7.6)
PROT UR QL STRIP.AUTO: NEGATIVE MG/DL
RBC # BLD AUTO: 4.1 X10(6)/MCL (ref 4.2–5.4)
RBC #/AREA URNS AUTO: ABNORMAL /HPF
RBC UR QL AUTO: ABNORMAL UNIT/L
SODIUM SERPL-SCNC: 131 MMOL/L (ref 136–145)
SP GR UR STRIP.AUTO: <=1.005
SQUAMOUS #/AREA URNS AUTO: ABNORMAL /HPF
TRIGL SERPL-MCNC: 86 MG/DL (ref 37–140)
UROBILINOGEN UR STRIP-ACNC: 0.2 MG/DL
VIT B12 SERPL-MCNC: 542 PG/ML (ref 213–816)
VLDLC SERPL CALC-MCNC: 17 MG/DL
WBC # SPEC AUTO: 9.5 X10(3)/MCL (ref 4.5–11.5)
WBC #/AREA URNS AUTO: ABNORMAL /HPF

## 2022-11-22 PROCEDURE — 80053 COMPREHEN METABOLIC PANEL: CPT

## 2022-11-22 PROCEDURE — 82306 VITAMIN D 25 HYDROXY: CPT

## 2022-11-22 PROCEDURE — 82607 VITAMIN B-12: CPT

## 2022-11-22 PROCEDURE — 36415 COLL VENOUS BLD VENIPUNCTURE: CPT

## 2022-11-22 PROCEDURE — 83036 HEMOGLOBIN GLYCOSYLATED A1C: CPT

## 2022-11-22 PROCEDURE — 85025 COMPLETE CBC W/AUTO DIFF WBC: CPT

## 2022-11-22 PROCEDURE — 80061 LIPID PANEL: CPT

## 2022-11-22 PROCEDURE — 81001 URINALYSIS AUTO W/SCOPE: CPT

## 2022-11-28 ENCOUNTER — OFFICE VISIT (OUTPATIENT)
Dept: FAMILY MEDICINE | Facility: CLINIC | Age: 62
End: 2022-11-28
Payer: MEDICAID

## 2022-11-28 VITALS
HEART RATE: 88 BPM | RESPIRATION RATE: 18 BRPM | BODY MASS INDEX: 21.96 KG/M2 | HEIGHT: 64 IN | TEMPERATURE: 99 F | DIASTOLIC BLOOD PRESSURE: 84 MMHG | SYSTOLIC BLOOD PRESSURE: 138 MMHG | OXYGEN SATURATION: 99 % | WEIGHT: 128.63 LBS

## 2022-11-28 DIAGNOSIS — I10 PRIMARY HYPERTENSION: ICD-10-CM

## 2022-11-28 DIAGNOSIS — Z12.31 BREAST CANCER SCREENING BY MAMMOGRAM: ICD-10-CM

## 2022-11-28 DIAGNOSIS — Z00.00 ROUTINE GENERAL MEDICAL EXAMINATION AT A HEALTH CARE FACILITY: Primary | ICD-10-CM

## 2022-11-28 DIAGNOSIS — Z78.0 POST-MENOPAUSE: ICD-10-CM

## 2022-11-28 DIAGNOSIS — J43.1 PANLOBULAR EMPHYSEMA: ICD-10-CM

## 2022-11-28 DIAGNOSIS — Z72.0 TOBACCO USER: ICD-10-CM

## 2022-11-28 DIAGNOSIS — Z12.11 SCREENING FOR MALIGNANT NEOPLASM OF COLON: ICD-10-CM

## 2022-11-28 PROBLEM — F10.929 ALCOHOLIC INTOXICATION: Status: ACTIVE | Noted: 2022-11-28

## 2022-11-28 PROBLEM — S42.023A CLOSED FRACTURE OF SHAFT OF CLAVICLE: Status: ACTIVE | Noted: 2022-11-28
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
## 2022-11-28 PROBLEM — Z83.42 FAMILY HISTORY OF HYPERCHOLESTEROLEMIA: Status: ACTIVE | Noted: 2022-11-28

## 2022-11-28 PROBLEM — R04.0 EPISTAXIS: Status: ACTIVE | Noted: 2022-11-28

## 2022-11-28 PROCEDURE — 1160F PR REVIEW ALL MEDS BY PRESCRIBER/CLIN PHARMACIST DOCUMENTED: ICD-10-PCS | Mod: CPTII,,, | Performed by: FAMILY MEDICINE

## 2022-11-28 PROCEDURE — 99396 PREV VISIT EST AGE 40-64: CPT | Mod: ,,, | Performed by: FAMILY MEDICINE

## 2022-11-28 PROCEDURE — 1159F MED LIST DOCD IN RCRD: CPT | Mod: CPTII,,, | Performed by: FAMILY MEDICINE

## 2022-11-28 PROCEDURE — 1159F PR MEDICATION LIST DOCUMENTED IN MEDICAL RECORD: ICD-10-PCS | Mod: CPTII,,, | Performed by: FAMILY MEDICINE

## 2022-11-28 PROCEDURE — 99396 PR PREVENTIVE VISIT,EST,40-64: ICD-10-PCS | Mod: ,,, | Performed by: FAMILY MEDICINE

## 2022-11-28 PROCEDURE — 3075F SYST BP GE 130 - 139MM HG: CPT | Mod: CPTII,,, | Performed by: FAMILY MEDICINE

## 2022-11-28 PROCEDURE — 4010F PR ACE/ARB THEARPY RXD/TAKEN: ICD-10-PCS | Mod: CPTII,,, | Performed by: FAMILY MEDICINE

## 2022-11-28 PROCEDURE — 3075F PR MOST RECENT SYSTOLIC BLOOD PRESS GE 130-139MM HG: ICD-10-PCS | Mod: CPTII,,, | Performed by: FAMILY MEDICINE

## 2022-11-28 PROCEDURE — 3008F BODY MASS INDEX DOCD: CPT | Mod: CPTII,,, | Performed by: FAMILY MEDICINE

## 2022-11-28 PROCEDURE — 4010F ACE/ARB THERAPY RXD/TAKEN: CPT | Mod: CPTII,,, | Performed by: FAMILY MEDICINE

## 2022-11-28 PROCEDURE — 3079F DIAST BP 80-89 MM HG: CPT | Mod: CPTII,,, | Performed by: FAMILY MEDICINE

## 2022-11-28 PROCEDURE — 3008F PR BODY MASS INDEX (BMI) DOCUMENTED: ICD-10-PCS | Mod: CPTII,,, | Performed by: FAMILY MEDICINE

## 2022-11-28 PROCEDURE — 1160F RVW MEDS BY RX/DR IN RCRD: CPT | Mod: CPTII,,, | Performed by: FAMILY MEDICINE

## 2022-11-28 PROCEDURE — 3079F PR MOST RECENT DIASTOLIC BLOOD PRESSURE 80-89 MM HG: ICD-10-PCS | Mod: CPTII,,, | Performed by: FAMILY MEDICINE

## 2022-11-28 RX ORDER — TIOTROPIUM BROMIDE 18 UG/1
18 CAPSULE ORAL; RESPIRATORY (INHALATION) DAILY
Qty: 90 CAPSULE | Refills: 3 | Status: SHIPPED | OUTPATIENT
Start: 2022-11-28 | End: 2024-03-04 | Stop reason: SDUPTHER

## 2022-11-28 RX ORDER — MELATONIN 10 MG
CAPSULE ORAL
COMMUNITY
End: 2024-03-04

## 2022-11-28 RX ORDER — ALENDRONATE SODIUM 70 MG/1
70 TABLET ORAL
Qty: 90 TABLET | Refills: 2 | Status: CANCELLED | OUTPATIENT
Start: 2022-11-28
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
## 2022-12-01 DIAGNOSIS — M80.00XA AGE-RELATED OSTEOPOROSIS WITH CURRENT PATHOLOGICAL FRACTURE, INITIAL ENCOUNTER: Primary | ICD-10-CM

## 2022-12-01 RX ORDER — ALENDRONATE SODIUM 70 MG/1
70 TABLET ORAL
Qty: 4 TABLET | Refills: 5 | Status: SHIPPED | OUTPATIENT
Start: 2022-12-01 | End: 2023-05-02

## 2022-12-02 RX ORDER — ESCITALOPRAM OXALATE 10 MG/1
10 TABLET ORAL DAILY
Qty: 30 TABLET | Refills: 11 | Status: SHIPPED | OUTPATIENT
Start: 2022-12-02 | End: 2023-11-07

## 2022-12-02 RX ORDER — BUDESONIDE AND FORMOTEROL FUMARATE DIHYDRATE 160; 4.5 UG/1; UG/1
2 AEROSOL RESPIRATORY (INHALATION) EVERY 12 HOURS
Qty: 10.2 G | Refills: 6 | Status: SHIPPED | OUTPATIENT
Start: 2022-12-02 | End: 2023-08-24

## 2022-12-02 RX ORDER — QUINAPRIL 40 MG/1
40 TABLET ORAL DAILY
Qty: 90 TABLET | Refills: 1 | Status: SHIPPED | OUTPATIENT
Start: 2022-12-02 | End: 2023-01-30

## 2022-12-02 RX ORDER — GABAPENTIN 300 MG/1
CAPSULE ORAL
Qty: 90 CAPSULE | Refills: 2 | Status: SHIPPED | OUTPATIENT
Start: 2022-12-02 | End: 2023-10-23

## 2022-12-02 RX ORDER — LOVASTATIN 40 MG/1
40 TABLET ORAL DAILY
Qty: 90 TABLET | Refills: 3 | Status: SHIPPED | OUTPATIENT
Start: 2022-12-02 | End: 2023-11-27

## 2022-12-02 RX ORDER — CETIRIZINE HYDROCHLORIDE 10 MG/1
10 TABLET ORAL DAILY
Qty: 30 TABLET | Refills: 11 | Status: SHIPPED | OUTPATIENT
Start: 2022-12-02 | End: 2023-12-11

## 2022-12-02 RX ORDER — HYDROXYZINE PAMOATE 25 MG/1
25 CAPSULE ORAL EVERY 4 HOURS PRN
Qty: 120 CAPSULE | Refills: 3 | Status: SHIPPED | OUTPATIENT
Start: 2022-12-02 | End: 2023-03-29

## 2022-12-02 RX ORDER — ALBUTEROL SULFATE 90 UG/1
1 AEROSOL, METERED RESPIRATORY (INHALATION) EVERY 4 HOURS PRN
Qty: 18 G | Refills: 2 | Status: SHIPPED | OUTPATIENT
Start: 2022-12-02 | End: 2023-03-14

## 2023-01-04 ENCOUNTER — ANESTHESIA EVENT (OUTPATIENT)
Dept: SURGERY | Facility: HOSPITAL | Age: 63
End: 2023-01-04
Payer: MEDICAID

## 2023-01-05 ENCOUNTER — HOSPITAL ENCOUNTER (OUTPATIENT)
Facility: HOSPITAL | Age: 63
Discharge: HOME | End: 2023-01-05
Attending: INTERNAL MEDICINE | Admitting: INTERNAL MEDICINE
Payer: MEDICAID

## 2023-01-05 ENCOUNTER — ANESTHESIA (OUTPATIENT)
Dept: SURGERY | Facility: HOSPITAL | Age: 63
End: 2023-01-05
Payer: MEDICAID

## 2023-01-05 DIAGNOSIS — Z12.11 SCREEN FOR COLON CANCER: Primary | ICD-10-CM

## 2023-01-05 LAB — CRC RECOMMENDATION EXT: NORMAL

## 2023-01-05 PROCEDURE — 37000009 HC ANESTHESIA EA ADD 15 MINS: Performed by: INTERNAL MEDICINE

## 2023-01-05 PROCEDURE — 27201423 OPTIME MED/SURG SUP & DEVICES STERILE SUPPLY: Performed by: INTERNAL MEDICINE

## 2023-01-05 PROCEDURE — 00811 ANES LWR INTST NDSC NOS: CPT | Performed by: INTERNAL MEDICINE

## 2023-01-05 PROCEDURE — 00811 ANES LWR INTST NDSC NOS: CPT | Mod: QZ,QS | Performed by: NURSE ANESTHETIST, CERTIFIED REGISTERED

## 2023-01-05 PROCEDURE — 25000003 PHARM REV CODE 250: Performed by: NURSE ANESTHETIST, CERTIFIED REGISTERED

## 2023-01-05 PROCEDURE — 63600175 PHARM REV CODE 636 W HCPCS: Performed by: NURSE ANESTHETIST, CERTIFIED REGISTERED

## 2023-01-05 PROCEDURE — 37000008 HC ANESTHESIA 1ST 15 MINUTES: Performed by: INTERNAL MEDICINE

## 2023-01-05 PROCEDURE — D9220AH HC ANESTHESIA PROFESSIONAL FEE: Mod: QZ,QS | Performed by: NURSE ANESTHETIST, CERTIFIED REGISTERED

## 2023-01-05 PROCEDURE — 45380 COLONOSCOPY AND BIOPSY: CPT | Performed by: INTERNAL MEDICINE

## 2023-01-05 RX ORDER — SODIUM CHLORIDE 9 MG/ML
INJECTION, SOLUTION INTRAVENOUS CONTINUOUS
Status: DISCONTINUED | OUTPATIENT
Start: 2023-01-05 | End: 2023-01-05 | Stop reason: HOSPADM

## 2023-01-05 RX ORDER — ONDANSETRON 2 MG/ML
INJECTION INTRAMUSCULAR; INTRAVENOUS
Status: DISCONTINUED | OUTPATIENT
Start: 2023-01-05 | End: 2023-01-05

## 2023-01-05 RX ORDER — LIDOCAINE HYDROCHLORIDE 10 MG/ML
INJECTION, SOLUTION EPIDURAL; INFILTRATION; INTRACAUDAL; PERINEURAL
Status: DISCONTINUED | OUTPATIENT
Start: 2023-01-05 | End: 2023-01-05

## 2023-01-05 RX ORDER — PROPOFOL 10 MG/ML
VIAL (ML) INTRAVENOUS
Status: DISCONTINUED | OUTPATIENT
Start: 2023-01-05 | End: 2023-01-05

## 2023-01-05 RX ADMIN — ONDANSETRON HYDROCHLORIDE 4 MG: 2 SOLUTION INTRAMUSCULAR; INTRAVENOUS at 09:01

## 2023-01-05 RX ADMIN — SODIUM CHLORIDE: 9 INJECTION, SOLUTION INTRAVENOUS at 09:01

## 2023-01-05 RX ADMIN — PROPOFOL 150 MG: 10 INJECTION, EMULSION INTRAVENOUS at 09:01

## 2023-01-05 RX ADMIN — PROPOFOL 100 MG: 10 INJECTION, EMULSION INTRAVENOUS at 10:01

## 2023-01-05 RX ADMIN — PROPOFOL 100 MG: 10 INJECTION, EMULSION INTRAVENOUS at 09:01

## 2023-01-05 RX ADMIN — LIDOCAINE HYDROCHLORIDE 20 MG: 10 INJECTION, SOLUTION EPIDURAL; INFILTRATION; INTRACAUDAL; PERINEURAL at 09:01

## 2023-01-05 RX ADMIN — PROPOFOL 50 MG: 10 INJECTION, EMULSION INTRAVENOUS at 10:01

## 2023-01-05 NOTE — DISCHARGE INSTRUCTIONS
Office will be in touch in 1 week to give results of polyp specimens   You had 8 polyps sent off to pathology  Follow up will be scheduled for either 1 year or 3 years depending on results from polyps
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
## 2023-01-05 NOTE — PROCEDURES
Colonoscopy Procedure Note    Date of procedure: 01/05/2023     Surgeon: Harpal Simmons    Procedure: Colonoscopy with snare polypectomy and hemostatic clip placement    Indications: screening colonoscopy        Post op Diagnosis: colon polyps        Sedation: Per anesthesia:       Procedure Details: The patient was brought to the endoscopy suite after the risks, benefits, and alternatives of the procedure were described and the patient was given the opportunity to ask questions and signed informed consent. Patient was positioned in the left lateral decubitus position, continuous monitoring was initiated, and supplemental oxygen was provided via nasal cannula. Adequate sedation was achieved with the medications documented in chart and titrated during the procedure. A digital rectal exam was performed. Under direct visualization the colonoscope was introduced into the rectum and advanced to the cecum. The ileocecal valve and appendiceal orifice were identified. The terminal ileum was not intubated. The scope was withdrawn, and the mucosa was examined in a circular fashion. Retroflexion was done in the rectum. Air was evacuated from the colon and the procedure was completed. The patient tolerated the procedure well and was transferred to the recovery area in stable condition.     Findings:  1cm cecal polyp  1.2 cm ascending colon polyp  1 cm transverse colon polyp  1.5cm descending colon polyp  6mm - 3cm sigmoid colon polyps  Total number of polyps = 8    Impression and Recommendations:   Hemostatic clip ([employer]) placed on the descending colon polyp stalk  Two Olympus clips malfunctioned and we were left with no clips. There were two additional stalk I would have preferred to clip.  Repeat Colonoscopy in one year due to size and number of polyps.       Harpal Simmons MD

## 2023-01-05 NOTE — DISCHARGE SUMMARY
[facility]  Discharge Note  Short Stay    Procedure(s) (LRB):  COLONOSCOPY (N/A)      OUTCOME: Patient tolerated treatment/procedure well without complication and is now ready for discharge.    DISPOSITION: Home or Self Care    FINAL DIAGNOSIS:  <principal problem not specified>    FOLLOWUP:  repeat Colonoscopy in 1 year due to size and number of polyps    DISCHARGE INSTRUCTIONS:  No discharge procedures on file.     TIME SPENT ON DISCHARGE: 15 minutes

## 2023-01-05 NOTE — ANESTHESIA POSTPROCEDURE EVALUATION
Anesthesia Post Evaluation    Patient: Andree Mcclelland    Procedure(s) Performed: Procedure(s) (LRB):  COLONOSCOPY (N/A)    Final Anesthesia Type: MAC      Patient location during evaluation: floor  Patient participation: Yes- Able to Participate  Level of consciousness: awake and alert  Post-procedure vital signs: reviewed and stable  Pain management: adequate  Airway patency: patent    PONV status at discharge: No PONV  Anesthetic complications: no      Cardiovascular status: blood pressure returned to baseline  Respiratory status: unassisted  Hydration status: euvolemic  Follow-up not needed.          Vitals Value Taken Time   BP  01/05/23 1034   Temp  01/05/23 1034   Pulse  01/05/23 1034   Resp  01/05/23 1034   SpO2  01/05/23 1034         No case tracking events are documented in the log.      Pain/Oly Score: No data recorded
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
## 2023-01-09 ENCOUNTER — TELEPHONE (OUTPATIENT)
Dept: FAMILY MEDICINE | Facility: CLINIC | Age: 63
End: 2023-01-09
Payer: MEDICAID

## 2023-01-09 DIAGNOSIS — F17.210 NICOTINE DEPENDENCE, CIGARETTES, UNCOMPLICATED: ICD-10-CM

## 2023-01-09 LAB
DHEA SERPL-MCNC: NORMAL
ESTROGEN SERPL-MCNC: NORMAL PG/ML
INSULIN SERPL-ACNC: NORMAL U[IU]/ML
LAB AP CLINICAL INFORMATION: NORMAL
LAB AP GROSS DESCRIPTION: NORMAL
LAB AP REPORT FOOTNOTES: NORMAL
T3RU NFR SERPL: NORMAL %

## 2023-01-10 VITALS
WEIGHT: 123.63 LBS | HEART RATE: 82 BPM | BODY MASS INDEX: 21.11 KG/M2 | DIASTOLIC BLOOD PRESSURE: 81 MMHG | SYSTOLIC BLOOD PRESSURE: 135 MMHG | HEIGHT: 64 IN | OXYGEN SATURATION: 98 % | TEMPERATURE: 98 F | RESPIRATION RATE: 20 BRPM

## 2023-01-30 DIAGNOSIS — I10 PRIMARY HYPERTENSION: Primary | ICD-10-CM

## 2023-01-30 RX ORDER — LISINOPRIL 40 MG/1
40 TABLET ORAL DAILY
Qty: 90 TABLET | Refills: 3 | Status: SHIPPED | OUTPATIENT
Start: 2023-01-30 | End: 2023-11-27

## 2023-02-06 ENCOUNTER — HOSPITAL ENCOUNTER (OUTPATIENT)
Dept: RADIOLOGY | Facility: HOSPITAL | Age: 63
Discharge: HOME | End: 2023-02-06
Attending: FAMILY MEDICINE
Payer: MEDICAID

## 2023-02-06 DIAGNOSIS — Z12.31 BREAST CANCER SCREENING BY MAMMOGRAM: ICD-10-CM

## 2023-02-06 DIAGNOSIS — F17.210 NICOTINE DEPENDENCE, CIGARETTES, UNCOMPLICATED: ICD-10-CM

## 2023-02-06 PROCEDURE — 77063 BREAST TOMOSYNTHESIS BI: CPT | Mod: 26,,, | Performed by: STUDENT IN AN ORGANIZED HEALTH CARE EDUCATION/TRAINING PROGRAM

## 2023-02-06 PROCEDURE — 77067 SCR MAMMO BI INCL CAD: CPT | Mod: 26,,, | Performed by: STUDENT IN AN ORGANIZED HEALTH CARE EDUCATION/TRAINING PROGRAM

## 2023-02-06 PROCEDURE — 71271 CT THORAX LUNG CANCER SCR C-: CPT | Mod: TC

## 2023-02-06 PROCEDURE — 77063 MAMMO DIGITAL SCREENING BILAT WITH TOMO: ICD-10-PCS | Mod: 26,,, | Performed by: STUDENT IN AN ORGANIZED HEALTH CARE EDUCATION/TRAINING PROGRAM

## 2023-02-06 PROCEDURE — 77067 SCR MAMMO BI INCL CAD: CPT | Mod: TC

## 2023-02-06 PROCEDURE — 77067 MAMMO DIGITAL SCREENING BILAT WITH TOMO: ICD-10-PCS | Mod: 26,,, | Performed by: STUDENT IN AN ORGANIZED HEALTH CARE EDUCATION/TRAINING PROGRAM

## 2023-02-07 ENCOUNTER — TELEPHONE (OUTPATIENT)
Dept: FAMILY MEDICINE | Facility: CLINIC | Age: 63
End: 2023-02-07
Payer: MEDICAID

## 2023-02-07 NOTE — TELEPHONE ENCOUNTER
----- Message from Vish Resendez DO sent at 2/7/2023 11:57 AM CST -----  All lab results within acceptable ranges. Repeat screen in 1 year.

## 2023-02-14 RX ORDER — ESCITALOPRAM OXALATE 5 MG/1
TABLET ORAL
Qty: 90 TABLET | Refills: 3 | OUTPATIENT
Start: 2023-02-14

## 2023-02-15 NOTE — TELEPHONE ENCOUNTER
----- Message from Vish Resendez DO sent at 2/14/2023  2:04 PM CST -----  Normal MMG. Repeat in 1 year.

## 2023-03-14 DIAGNOSIS — J43.1 PANLOBULAR EMPHYSEMA: Primary | ICD-10-CM

## 2023-03-14 RX ORDER — ALBUTEROL SULFATE 90 UG/1
AEROSOL, METERED RESPIRATORY (INHALATION)
Qty: 18 G | Refills: 2 | Status: SHIPPED | OUTPATIENT
Start: 2023-03-14 | End: 2023-05-24

## 2023-03-15 NOTE — TELEPHONE ENCOUNTER
After speaking with the patient she is unable to lift her arm and is in severe pain. States symptoms have worsened since she fell 3 weeks ago. Instructed her to go to the nearest er due to her symptoms and if she needs a follow up we can schedule her one once she is discharged. She verbalized understanding.
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
## 2023-05-02 DIAGNOSIS — M80.00XA AGE-RELATED OSTEOPOROSIS WITH CURRENT PATHOLOGICAL FRACTURE, INITIAL ENCOUNTER: ICD-10-CM

## 2023-05-02 RX ORDER — ALENDRONATE SODIUM 70 MG/1
TABLET ORAL
Qty: 4 TABLET | Refills: 5 | Status: SHIPPED | OUTPATIENT
Start: 2023-05-02 | End: 2023-10-23

## 2023-05-23 DIAGNOSIS — J43.1 PANLOBULAR EMPHYSEMA: ICD-10-CM

## 2023-05-24 RX ORDER — ALBUTEROL SULFATE 90 UG/1
AEROSOL, METERED RESPIRATORY (INHALATION)
Qty: 6.7 G | Refills: 2 | Status: SHIPPED | OUTPATIENT
Start: 2023-05-24 | End: 2023-08-24

## 2023-06-01 ENCOUNTER — PATIENT OUTREACH (OUTPATIENT)
Dept: ADMINISTRATIVE | Facility: HOSPITAL | Age: 63
End: 2023-06-01
Payer: MEDICAID

## 2023-06-01 NOTE — PROGRESS NOTES
Population Health. [facility]. Reviewing patient's chart for quality metrics. I contacted patient to see if she has had a recent Pap smear. Patient reports las pap smear in 2018.

## 2023-07-17 ENCOUNTER — OFFICE VISIT (OUTPATIENT)
Dept: FAMILY MEDICINE | Facility: CLINIC | Age: 63
End: 2023-07-17
Payer: MEDICAID

## 2023-07-17 ENCOUNTER — DOCUMENTATION ONLY (OUTPATIENT)
Dept: FAMILY MEDICINE | Facility: CLINIC | Age: 63
End: 2023-07-17

## 2023-07-17 ENCOUNTER — HOSPITAL ENCOUNTER (OUTPATIENT)
Dept: RADIOLOGY | Facility: HOSPITAL | Age: 63
Discharge: HOME | End: 2023-07-17
Attending: FAMILY MEDICINE
Payer: MEDICAID

## 2023-07-17 VITALS
DIASTOLIC BLOOD PRESSURE: 81 MMHG | TEMPERATURE: 99 F | HEART RATE: 94 BPM | WEIGHT: 121.38 LBS | BODY MASS INDEX: 20.72 KG/M2 | RESPIRATION RATE: 18 BRPM | OXYGEN SATURATION: 97 % | SYSTOLIC BLOOD PRESSURE: 160 MMHG | HEIGHT: 64 IN
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
## 2023-07-17 DIAGNOSIS — J43.1 PANLOBULAR EMPHYSEMA: ICD-10-CM
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
## 2023-07-17 PROCEDURE — 3079F PR MOST RECENT DIASTOLIC BLOOD PRESSURE 80-89 MM HG: ICD-10-PCS | Mod: CPTII,,, | Performed by: FAMILY MEDICINE

## 2023-07-17 PROCEDURE — 3008F PR BODY MASS INDEX (BMI) DOCUMENTED: ICD-10-PCS | Mod: CPTII,,, | Performed by: FAMILY MEDICINE

## 2023-07-17 PROCEDURE — 3079F DIAST BP 80-89 MM HG: CPT | Mod: CPTII,,, | Performed by: FAMILY MEDICINE

## 2023-07-17 PROCEDURE — 1160F RVW MEDS BY RX/DR IN RCRD: CPT | Mod: CPTII,,, | Performed by: FAMILY MEDICINE

## 2023-07-17 PROCEDURE — 99214 OFFICE O/P EST MOD 30 MIN: CPT | Mod: ,,, | Performed by: FAMILY MEDICINE

## 2023-07-17 PROCEDURE — 3077F PR MOST RECENT SYSTOLIC BLOOD PRESSURE >= 140 MM HG: ICD-10-PCS | Mod: CPTII,,, | Performed by: FAMILY MEDICINE

## 2023-07-17 PROCEDURE — 3008F BODY MASS INDEX DOCD: CPT | Mod: CPTII,,, | Performed by: FAMILY MEDICINE

## 2023-07-17 PROCEDURE — 1159F PR MEDICATION LIST DOCUMENTED IN MEDICAL RECORD: ICD-10-PCS | Mod: CPTII,,, | Performed by: FAMILY MEDICINE

## 2023-07-17 PROCEDURE — 73030 X-RAY EXAM OF SHOULDER: CPT | Mod: TC,RT

## 2023-07-17 PROCEDURE — 1160F PR REVIEW ALL MEDS BY PRESCRIBER/CLIN PHARMACIST DOCUMENTED: ICD-10-PCS | Mod: CPTII,,, | Performed by: FAMILY MEDICINE

## 2023-07-17 PROCEDURE — 4010F PR ACE/ARB THEARPY RXD/TAKEN: ICD-10-PCS | Mod: CPTII,,, | Performed by: FAMILY MEDICINE

## 2023-07-17 PROCEDURE — 4010F ACE/ARB THERAPY RXD/TAKEN: CPT | Mod: CPTII,,, | Performed by: FAMILY MEDICINE

## 2023-07-17 PROCEDURE — 99214 PR OFFICE/OUTPT VISIT, EST, LEVL IV, 30-39 MIN: ICD-10-PCS | Mod: ,,, | Performed by: FAMILY MEDICINE

## 2023-07-17 PROCEDURE — 3077F SYST BP >= 140 MM HG: CPT | Mod: CPTII,,, | Performed by: FAMILY MEDICINE

## 2023-07-17 PROCEDURE — 1159F MED LIST DOCD IN RCRD: CPT | Mod: CPTII,,, | Performed by: FAMILY MEDICINE

## 2023-07-17 RX ORDER — CYCLOBENZAPRINE HCL 5 MG
5 TABLET ORAL 3 TIMES DAILY PRN
Qty: 30 TABLET | Refills: 0 | Status: SHIPPED | OUTPATIENT
Start: 2023-07-17 | End: 2023-07-27

## 2023-07-17 RX ORDER — HYDROXYZINE PAMOATE 25 MG/1
CAPSULE ORAL
Qty: 120 CAPSULE | Refills: 3 | Status: SHIPPED | OUTPATIENT
Start: 2023-07-17 | End: 2023-11-27
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
## 2023-07-18 ENCOUNTER — TELEPHONE (OUTPATIENT)
Dept: FAMILY MEDICINE | Facility: CLINIC | Age: 63
End: 2023-07-18
Payer: MEDICAID

## 2023-07-18 NOTE — TELEPHONE ENCOUNTER
----- Message from Vish Resendez DO sent at 7/18/2023 11:18 AM CDT -----  I have reviewed the imaging results. There are no significant abnormalities noted.

## 2023-10-23 DIAGNOSIS — G62.9 NEUROPATHY: Primary | ICD-10-CM

## 2023-10-23 DIAGNOSIS — M80.00XA AGE-RELATED OSTEOPOROSIS WITH CURRENT PATHOLOGICAL FRACTURE, INITIAL ENCOUNTER: ICD-10-CM

## 2023-10-23 RX ORDER — GABAPENTIN 300 MG/1
CAPSULE ORAL
Qty: 30 CAPSULE | Refills: 8 | Status: SHIPPED | OUTPATIENT
Start: 2023-10-23

## 2023-10-23 RX ORDER — ALENDRONATE SODIUM 70 MG/1
TABLET ORAL
Qty: 4 TABLET | Refills: 5 | Status: SHIPPED | OUTPATIENT
Start: 2023-10-23 | End: 2024-03-25

## 2023-11-07 DIAGNOSIS — F32.5 MAJOR DEPRESSION IN REMISSION: Primary | ICD-10-CM

## 2023-11-07 RX ORDER — ESCITALOPRAM OXALATE 10 MG/1
10 TABLET ORAL
Qty: 90 TABLET | Refills: 0 | Status: SHIPPED | OUTPATIENT
Start: 2023-11-07 | End: 2024-03-04 | Stop reason: SDUPTHER

## 2023-11-25 DIAGNOSIS — E78.2 MIXED HYPERLIPIDEMIA: Primary | ICD-10-CM

## 2023-11-25 DIAGNOSIS — I10 PRIMARY HYPERTENSION: ICD-10-CM

## 2023-11-25 DIAGNOSIS — F41.9 ANXIETY: Primary | ICD-10-CM

## 2023-11-27 RX ORDER — LOVASTATIN 40 MG/1
40 TABLET ORAL
Qty: 90 TABLET | Refills: 0 | Status: SHIPPED | OUTPATIENT
Start: 2023-11-27 | End: 2024-03-04 | Stop reason: SDUPTHER

## 2023-11-27 RX ORDER — HYDROXYZINE PAMOATE 25 MG/1
CAPSULE ORAL
Qty: 360 CAPSULE | Refills: 2 | Status: SHIPPED | OUTPATIENT
Start: 2023-11-27

## 2023-11-27 RX ORDER — LISINOPRIL 40 MG/1
40 TABLET ORAL
Qty: 90 TABLET | Refills: 4 | Status: SHIPPED | OUTPATIENT
Start: 2023-11-27

## 2023-12-01 DIAGNOSIS — Z11.4 SCREENING FOR HIV (HUMAN IMMUNODEFICIENCY VIRUS): ICD-10-CM

## 2023-12-01 DIAGNOSIS — F10.20 ALCOHOLISM: ICD-10-CM

## 2023-12-01 DIAGNOSIS — Z00.00 WELLNESS EXAMINATION: Primary | ICD-10-CM

## 2023-12-01 DIAGNOSIS — I10 PRIMARY HYPERTENSION: ICD-10-CM

## 2023-12-01 DIAGNOSIS — E87.1 CHRONIC HYPONATREMIA: ICD-10-CM

## 2023-12-07 DIAGNOSIS — J43.1 PANLOBULAR EMPHYSEMA: ICD-10-CM

## 2023-12-07 RX ORDER — ALBUTEROL SULFATE 90 UG/1
AEROSOL, METERED RESPIRATORY (INHALATION)
Qty: 18 G | Refills: 2 | Status: SHIPPED | OUTPATIENT
Start: 2023-12-07

## 2023-12-11 DIAGNOSIS — J43.1 PANLOBULAR EMPHYSEMA: ICD-10-CM

## 2023-12-11 RX ORDER — CETIRIZINE HYDROCHLORIDE 10 MG/1
10 TABLET ORAL
Qty: 30 TABLET | Refills: 11 | Status: SHIPPED | OUTPATIENT
Start: 2023-12-11

## 2024-02-05 ENCOUNTER — PATIENT MESSAGE (OUTPATIENT)
Dept: ADMINISTRATIVE | Facility: HOSPITAL | Age: 64
End: 2024-02-05
Payer: MEDICAID

## 2024-02-12 DIAGNOSIS — Z12.2 SCREENING FOR MALIGNANT NEOPLASM OF RESPIRATORY ORGAN: Primary | ICD-10-CM

## 2024-02-12 DIAGNOSIS — Z87.891 PERSONAL HISTORY OF TOBACCO USE, PRESENTING HAZARDS TO HEALTH: ICD-10-CM

## 2024-02-20 ENCOUNTER — HOSPITAL ENCOUNTER (OUTPATIENT)
Dept: RADIOLOGY | Facility: HOSPITAL | Age: 64
Discharge: HOME | End: 2024-02-20
Attending: FAMILY MEDICINE
Payer: MEDICAID

## 2024-02-20 DIAGNOSIS — Z87.891 PERSONAL HISTORY OF TOBACCO USE, PRESENTING HAZARDS TO HEALTH: ICD-10-CM

## 2024-02-20 PROCEDURE — 71271 CT THORAX LUNG CANCER SCR C-: CPT | Mod: TC

## 2024-02-27 DIAGNOSIS — Z91.89 PULMONARY NODULE LESS THAN 6 MM IN DIAMETER WITH HIGH RISK FOR MALIGNANT NEOPLASM: Primary | ICD-10-CM

## 2024-02-27 DIAGNOSIS — R91.8 MULTIPLE PULMONARY NODULES: ICD-10-CM

## 2024-02-27 DIAGNOSIS — R91.1 PULMONARY NODULE LESS THAN 6 MM IN DIAMETER WITH HIGH RISK FOR MALIGNANT NEOPLASM: Primary | ICD-10-CM

## 2024-03-01 ENCOUNTER — LAB VISIT (OUTPATIENT)
Dept: LAB | Facility: HOSPITAL | Age: 64
End: 2024-03-01
Attending: FAMILY MEDICINE
Payer: MEDICAID

## 2024-03-01 DIAGNOSIS — F10.20 ALCOHOLISM: ICD-10-CM

## 2024-03-01 DIAGNOSIS — I10 PRIMARY HYPERTENSION: ICD-10-CM

## 2024-03-01 DIAGNOSIS — Z00.00 WELLNESS EXAMINATION: ICD-10-CM

## 2024-03-01 DIAGNOSIS — Z11.4 SCREENING FOR HIV (HUMAN IMMUNODEFICIENCY VIRUS): ICD-10-CM

## 2024-03-01 DIAGNOSIS — E87.1 CHRONIC HYPONATREMIA: ICD-10-CM

## 2024-03-01 LAB
ALBUMIN SERPL-MCNC: 3.7 G/DL (ref 3.4–4.8)
ALBUMIN/GLOB SERPL: 1 RATIO (ref 1.1–2)
ALP SERPL-CCNC: 84 UNIT/L (ref 40–150)
ALT SERPL-CCNC: 16 UNIT/L (ref 0–55)
AST SERPL-CCNC: 23 UNIT/L (ref 5–34)
BASOPHILS # BLD AUTO: 0.04 X10(3)/MCL
BASOPHILS NFR BLD AUTO: 0.4 %
BILIRUB SERPL-MCNC: 0.3 MG/DL
BUN SERPL-MCNC: 7 MG/DL (ref 9.8–20.1)
CALCIUM SERPL-MCNC: 9.7 MG/DL (ref 8.4–10.2)
CHLORIDE SERPL-SCNC: 99 MMOL/L (ref 98–107)
CHOLEST SERPL-MCNC: 178 MG/DL
CHOLEST/HDLC SERPL: 2 {RATIO} (ref 0–5)
CO2 SERPL-SCNC: 24 MMOL/L (ref 23–31)
CREAT SERPL-MCNC: 0.93 MG/DL (ref 0.55–1.02)
DEPRECATED CALCIDIOL+CALCIFEROL SERPL-MC: 23.4 NG/ML (ref 30–80)
EOSINOPHIL # BLD AUTO: 0.15 X10(3)/MCL (ref 0–0.9)
EOSINOPHIL NFR BLD AUTO: 1.5 %
ERYTHROCYTE [DISTWIDTH] IN BLOOD BY AUTOMATED COUNT: 15.6 % (ref 11.5–17)
GFR SERPLBLD CREATININE-BSD FMLA CKD-EPI: >60 MLS/MIN/1.73/M2
GLOBULIN SER-MCNC: 3.7 GM/DL (ref 2.4–3.5)
GLUCOSE SERPL-MCNC: 148 MG/DL (ref 82–115)
HCT VFR BLD AUTO: 40.9 % (ref 37–47)
HDLC SERPL-MCNC: 81 MG/DL (ref 35–60)
HGB BLD-MCNC: 13.9 G/DL (ref 12–16)
HIV 1+2 AB+HIV1 P24 AG SERPL QL IA: NONREACTIVE
IMM GRANULOCYTES # BLD AUTO: 0.04 X10(3)/MCL (ref 0–0.04)
IMM GRANULOCYTES NFR BLD AUTO: 0.4 %
LDLC SERPL CALC-MCNC: 77 MG/DL (ref 50–140)
LYMPHOCYTES # BLD AUTO: 3.15 X10(3)/MCL (ref 0.6–4.6)
LYMPHOCYTES NFR BLD AUTO: 31.5 %
MCH RBC QN AUTO: 31.2 PG (ref 27–31)
MCHC RBC AUTO-ENTMCNC: 34 G/DL (ref 33–36)
MCV RBC AUTO: 91.9 FL (ref 80–94)
MONOCYTES # BLD AUTO: 0.72 X10(3)/MCL (ref 0.1–1.3)
MONOCYTES NFR BLD AUTO: 7.2 %
NEUTROPHILS # BLD AUTO: 5.9 X10(3)/MCL (ref 2.1–9.2)
NEUTROPHILS NFR BLD AUTO: 59 %
NRBC BLD AUTO-RTO: 0 %
PLATELET # BLD AUTO: 203 X10(3)/MCL (ref 130–400)
PMV BLD AUTO: 9.6 FL (ref 7.4–10.4)
POTASSIUM SERPL-SCNC: 4.6 MMOL/L (ref 3.5–5.1)
PROT SERPL-MCNC: 7.4 GM/DL (ref 5.8–7.6)
RBC # BLD AUTO: 4.45 X10(6)/MCL (ref 4.2–5.4)
SODIUM SERPL-SCNC: 132 MMOL/L (ref 136–145)
TRIGL SERPL-MCNC: 102 MG/DL (ref 37–140)
TSH SERPL-ACNC: 0.36 UIU/ML (ref 0.35–4.94)
VIT B12 SERPL-MCNC: 840 PG/ML (ref 213–816)
VLDLC SERPL CALC-MCNC: 20 MG/DL
WBC # SPEC AUTO: 10 X10(3)/MCL (ref 4.5–11.5)

## 2024-03-01 PROCEDURE — 80053 COMPREHEN METABOLIC PANEL: CPT

## 2024-03-01 PROCEDURE — 82306 VITAMIN D 25 HYDROXY: CPT

## 2024-03-01 PROCEDURE — 87389 HIV-1 AG W/HIV-1&-2 AB AG IA: CPT

## 2024-03-01 PROCEDURE — 80061 LIPID PANEL: CPT

## 2024-03-01 PROCEDURE — 84443 ASSAY THYROID STIM HORMONE: CPT

## 2024-03-01 PROCEDURE — 82607 VITAMIN B-12: CPT

## 2024-03-01 PROCEDURE — 85025 COMPLETE CBC W/AUTO DIFF WBC: CPT

## 2024-03-01 PROCEDURE — 36415 COLL VENOUS BLD VENIPUNCTURE: CPT

## 2024-03-04 ENCOUNTER — HOSPITAL ENCOUNTER (OUTPATIENT)
Dept: RADIOLOGY | Facility: HOSPITAL | Age: 64
Discharge: HOME | End: 2024-03-04
Payer: MEDICAID

## 2024-03-04 ENCOUNTER — TELEPHONE (OUTPATIENT)
Dept: FAMILY MEDICINE | Facility: CLINIC | Age: 64
End: 2024-03-04

## 2024-03-04 ENCOUNTER — OFFICE VISIT (OUTPATIENT)
Dept: FAMILY MEDICINE | Facility: CLINIC | Age: 64
End: 2024-03-04
Payer: MEDICAID

## 2024-03-04 VITALS
WEIGHT: 108 LBS | HEART RATE: 88 BPM | HEIGHT: 64 IN | TEMPERATURE: 98 F | DIASTOLIC BLOOD PRESSURE: 80 MMHG | RESPIRATION RATE: 20 BRPM | BODY MASS INDEX: 18.44 KG/M2 | OXYGEN SATURATION: 98 % | SYSTOLIC BLOOD PRESSURE: 123 MMHG

## 2024-03-04 DIAGNOSIS — Z12.11 COLON CANCER SCREENING: ICD-10-CM

## 2024-03-04 DIAGNOSIS — E87.1 CHRONIC HYPONATREMIA: ICD-10-CM

## 2024-03-04 DIAGNOSIS — M25.511 ACUTE PAIN OF RIGHT SHOULDER: ICD-10-CM

## 2024-03-04 DIAGNOSIS — Z91.81 HISTORY OF RECENT FALL: ICD-10-CM

## 2024-03-04 DIAGNOSIS — R91.1 LUNG NODULE SEEN ON IMAGING STUDY: ICD-10-CM

## 2024-03-04 DIAGNOSIS — J43.1 PANLOBULAR EMPHYSEMA: ICD-10-CM

## 2024-03-04 DIAGNOSIS — E78.2 MIXED HYPERLIPIDEMIA: ICD-10-CM

## 2024-03-04 DIAGNOSIS — E55.9 VITAMIN D DEFICIENCY: ICD-10-CM

## 2024-03-04 DIAGNOSIS — Z00.00 WELLNESS EXAMINATION: Primary | ICD-10-CM

## 2024-03-04 DIAGNOSIS — F32.5 MAJOR DEPRESSION IN REMISSION: ICD-10-CM

## 2024-03-04 DIAGNOSIS — R73.01 ELEVATED FASTING BLOOD SUGAR: ICD-10-CM

## 2024-03-04 DIAGNOSIS — Z12.31 ENCOUNTER FOR SCREENING MAMMOGRAM FOR MALIGNANT NEOPLASM OF BREAST: ICD-10-CM

## 2024-03-04 PROCEDURE — 3079F DIAST BP 80-89 MM HG: CPT | Mod: CPTII,,,

## 2024-03-04 PROCEDURE — 3074F SYST BP LT 130 MM HG: CPT | Mod: CPTII,,,

## 2024-03-04 PROCEDURE — 3008F BODY MASS INDEX DOCD: CPT | Mod: CPTII,,,

## 2024-03-04 PROCEDURE — 1160F RVW MEDS BY RX/DR IN RCRD: CPT | Mod: CPTII,,,

## 2024-03-04 PROCEDURE — 99396 PREV VISIT EST AGE 40-64: CPT | Mod: ,,,

## 2024-03-04 PROCEDURE — 4010F ACE/ARB THERAPY RXD/TAKEN: CPT | Mod: CPTII,,,

## 2024-03-04 PROCEDURE — 1159F MED LIST DOCD IN RCRD: CPT | Mod: CPTII,,,

## 2024-03-04 PROCEDURE — 73030 X-RAY EXAM OF SHOULDER: CPT | Mod: TC,RT

## 2024-03-04 PROCEDURE — 99214 OFFICE O/P EST MOD 30 MIN: CPT | Mod: 25,,,

## 2024-03-04 RX ORDER — LOVASTATIN 40 MG/1
40 TABLET ORAL DAILY
Qty: 90 TABLET | Refills: 1 | Status: SHIPPED | OUTPATIENT
Start: 2024-03-04 | End: 2024-06-03

## 2024-03-04 RX ORDER — KETOROLAC TROMETHAMINE 10 MG/1
10 TABLET, FILM COATED ORAL EVERY 6 HOURS PRN
Qty: 20 TABLET | Refills: 0 | Status: SHIPPED | OUTPATIENT
Start: 2024-03-04 | End: 2024-03-09

## 2024-03-04 RX ORDER — TIOTROPIUM BROMIDE 18 UG/1
18 CAPSULE ORAL; RESPIRATORY (INHALATION) DAILY
Qty: 90 CAPSULE | Refills: 3 | Status: SHIPPED | OUTPATIENT
Start: 2024-03-04 | End: 2025-03-04

## 2024-03-04 RX ORDER — ERGOCALCIFEROL 1.25 MG/1
50000 CAPSULE ORAL
Qty: 8 CAPSULE | Refills: 0 | Status: SHIPPED | OUTPATIENT
Start: 2024-03-04 | End: 2024-03-25

## 2024-03-04 RX ORDER — ESCITALOPRAM OXALATE 10 MG/1
10 TABLET ORAL DAILY
Qty: 90 TABLET | Refills: 1 | Status: SHIPPED | OUTPATIENT
Start: 2024-03-04 | End: 2024-06-03

## 2024-03-04 NOTE — ASSESSMENT & PLAN NOTE
Continue Spiriva 18 mcg daily + Albuterol PRN + Symbicort every 12 hours.   Use inhalers as prescribed (rinse mouth after use of steroid inhalers). Use long term inhalers daily and rescue inhaler as needed.  Avoid triggers (high humidity, strong odors, chemical fumes).  Report signs of upper respiratory infection immediately for early treatment.  Flu shot recommended yearly.  Practice abdominal breathing. Eat smaller, more frequent meals.  Smoking Cessation encouraged.

## 2024-03-04 NOTE — TELEPHONE ENCOUNTER
----- Message from Lupe Escobedo NP sent at 3/4/2024 11:09 AM CST -----  A1C is within normal range.

## 2024-03-04 NOTE — ASSESSMENT & PLAN NOTE
Lab Results   Component Value Date    [id number] 23.4 (L) 03/01/2024    [id number] 17.5 (L) 11/22/2022      Start Vitamin D 50,000 units by mouth every 7 days for 8 weeks. Recheck in 6 months.     Avoid taking high-dose calcium supplements.   Avoid taking multiple supplements containing vitamin D as this can lead to toxicity.   Foods that contain Vitamin D include milk, orange juice, salmon, canned tuna.

## 2024-03-04 NOTE — ASSESSMENT & PLAN NOTE
Continue Lexapro 10 mg daily.   Educated patient on the risks of serotonin based medications such as serotonin modulators and SSRIs/SNRIs including common side effects of nausea, GI upset, headache dizziness as well as the rare risk for worsening symptoms of depression including development of suicidal thoughts or ideations, and serotonin syndrome.   Discussed benefits of medication not becoming noticeable until up to 6 weeks from start date.   Exercise daily. Get sunlight daily.  Practice positive phrases and repeat throughout the day, along with yoga and relaxation techniques.  Establish good social support, make changes to reduce stress.  Reports any symptoms of suicidal/homicidal ideations or self harm immediately, if clinic is closed go to nearest emergency room.
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
## 2024-03-04 NOTE — ASSESSMENT & PLAN NOTE
Lab Results   Component Value Date    LDL 77.00 03/01/2024    TRIG 102 03/01/2024    HDL 81 (H) 03/01/2024    TOTALCHOLEST 2 03/01/2024     Continue Lovastatin 40 mg daily.   Follow a low cholesterol, low saturated fat diet with less that 200mg of cholesterol a day.  Avoid fried foods and high saturated fats (high saturated fats less than 7% of calories).  Add Flax Seed/Fish Oil supplements to diet. Increase dietary fiber.  Regular exercise can reduce LDL and raise HDL. Stressed importance of physical activity 5 times per week for 30 minutes per day.

## 2024-03-05 ENCOUNTER — TELEPHONE (OUTPATIENT)
Dept: FAMILY MEDICINE | Facility: CLINIC | Age: 64
End: 2024-03-05
Payer: MEDICAID

## 2024-03-05 ENCOUNTER — HOSPITAL ENCOUNTER (OUTPATIENT)
Dept: RADIOLOGY | Facility: HOSPITAL | Age: 64
Discharge: HOME | End: 2024-03-05
Payer: MEDICAID

## 2024-03-05 DIAGNOSIS — Z12.31 ENCOUNTER FOR SCREENING MAMMOGRAM FOR MALIGNANT NEOPLASM OF BREAST: ICD-10-CM

## 2024-03-05 PROCEDURE — 77063 BREAST TOMOSYNTHESIS BI: CPT | Mod: 26,,, | Performed by: RADIOLOGY

## 2024-03-05 PROCEDURE — 77067 SCR MAMMO BI INCL CAD: CPT | Mod: TC

## 2024-03-05 PROCEDURE — 77067 SCR MAMMO BI INCL CAD: CPT | Mod: 26,,, | Performed by: RADIOLOGY

## 2024-03-05 NOTE — TELEPHONE ENCOUNTER
----- Message from Lupe Escobedo NP sent at 3/5/2024  2:00 PM CST -----  Normal MMG. Repeat in 1 year.

## 2024-03-05 NOTE — TELEPHONE ENCOUNTER
----- Message from Lupe Escobedo NP sent at 3/5/2024  8:14 AM CST -----  Xray shoulder shows no acute abnormalities.

## 2024-03-11 ENCOUNTER — DOCUMENTATION ONLY (OUTPATIENT)
Dept: FAMILY MEDICINE | Facility: CLINIC | Age: 64
End: 2024-03-11

## 2024-03-11 ENCOUNTER — OFFICE VISIT (OUTPATIENT)
Dept: FAMILY MEDICINE | Facility: CLINIC | Age: 64
End: 2024-03-11
Payer: MEDICAID

## 2024-03-11 VITALS
OXYGEN SATURATION: 97 % | WEIGHT: 114 LBS | DIASTOLIC BLOOD PRESSURE: 81 MMHG | BODY MASS INDEX: 19.46 KG/M2 | HEIGHT: 64 IN | SYSTOLIC BLOOD PRESSURE: 138 MMHG | HEART RATE: 81 BPM | RESPIRATION RATE: 20 BRPM | TEMPERATURE: 98 F

## 2024-03-11 DIAGNOSIS — Z12.4 PAP SMEAR FOR CERVICAL CANCER SCREENING: Primary | ICD-10-CM

## 2024-03-11 DIAGNOSIS — Z72.0 TOBACCO USER: ICD-10-CM

## 2024-03-11 PROCEDURE — 4010F ACE/ARB THERAPY RXD/TAKEN: CPT | Mod: CPTII,,,

## 2024-03-11 PROCEDURE — 99396 PREV VISIT EST AGE 40-64: CPT | Mod: ,,,

## 2024-03-11 PROCEDURE — 1159F MED LIST DOCD IN RCRD: CPT | Mod: CPTII,,,

## 2024-03-11 PROCEDURE — 3044F HG A1C LEVEL LT 7.0%: CPT | Mod: CPTII,,,

## 2024-03-11 PROCEDURE — 1160F RVW MEDS BY RX/DR IN RCRD: CPT | Mod: CPTII,,,

## 2024-03-11 PROCEDURE — 3075F SYST BP GE 130 - 139MM HG: CPT | Mod: CPTII,,,

## 2024-03-11 PROCEDURE — 3008F BODY MASS INDEX DOCD: CPT | Mod: CPTII,,,

## 2024-03-11 PROCEDURE — 3079F DIAST BP 80-89 MM HG: CPT | Mod: CPTII,,,

## 2024-03-11 RX ORDER — IBUPROFEN 200 MG
1 TABLET ORAL DAILY
Qty: 30 PATCH | Refills: 11 | Status: SHIPPED | OUTPATIENT
Start: 2024-03-11 | End: 2025-03-11
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
## 2024-03-18 ENCOUNTER — TELEPHONE (OUTPATIENT)
Dept: FAMILY MEDICINE | Facility: CLINIC | Age: 64
End: 2024-03-18
Payer: MEDICAID

## 2024-03-18 LAB — PSYCHE PATHOLOGY RESULT: NORMAL

## 2024-03-18 NOTE — TELEPHONE ENCOUNTER
----- Message from Lupe Escobedo NP sent at 3/18/2024  9:00 AM CDT -----  Please inform patient of lab results.     1. Pap negative for intraepithelial lesions. All molecular testing is negative.     Thanks for all you do,   Lupe

## 2024-03-24 DIAGNOSIS — E55.9 VITAMIN D DEFICIENCY: ICD-10-CM

## 2024-03-25 DIAGNOSIS — M80.00XA AGE-RELATED OSTEOPOROSIS WITH CURRENT PATHOLOGICAL FRACTURE, INITIAL ENCOUNTER: ICD-10-CM

## 2024-03-25 RX ORDER — ALENDRONATE SODIUM 70 MG/1
TABLET ORAL
Qty: 12 TABLET | Refills: 1 | Status: SHIPPED | OUTPATIENT
Start: 2024-03-25

## 2024-03-25 RX ORDER — ERGOCALCIFEROL 1.25 MG/1
CAPSULE ORAL
Qty: 12 CAPSULE | Refills: 1 | Status: SHIPPED | OUTPATIENT
Start: 2024-03-25

## 2024-03-30 DIAGNOSIS — J43.1 PANLOBULAR EMPHYSEMA: ICD-10-CM

## 2024-04-01 RX ORDER — BUDESONIDE AND FORMOTEROL FUMARATE DIHYDRATE 160; 4.5 UG/1; UG/1
2 AEROSOL RESPIRATORY (INHALATION) EVERY 12 HOURS
Qty: 10.2 G | Refills: 6 | Status: SHIPPED | OUTPATIENT
Start: 2024-04-01

## 2024-04-22 ENCOUNTER — DOCUMENTATION ONLY (OUTPATIENT)
Dept: FAMILY MEDICINE | Facility: CLINIC | Age: 64
End: 2024-04-22
Payer: MEDICAID

## 2024-04-22 ENCOUNTER — TELEPHONE (OUTPATIENT)
Dept: FAMILY MEDICINE | Facility: CLINIC | Age: 64
End: 2024-04-22
Payer: MEDICAID

## 2024-04-22 NOTE — TELEPHONE ENCOUNTER
----- Message from Marybeth Stovall sent at 4/22/2024 10:21 AM CDT -----  Patient seen Dr. Johnston over a month ago, he ordered a pet scan on the patient, he told her she has multiple fractures and needs physical therapy and was supposed to send us his recommendation.  Can we look into physical therapy referral ?

## 2024-04-24 ENCOUNTER — OFFICE VISIT (OUTPATIENT)
Dept: FAMILY MEDICINE | Facility: CLINIC | Age: 64
End: 2024-04-24
Payer: MEDICAID

## 2024-04-24 VITALS
OXYGEN SATURATION: 96 % | WEIGHT: 116 LBS | TEMPERATURE: 98 F | SYSTOLIC BLOOD PRESSURE: 102 MMHG | BODY MASS INDEX: 19.81 KG/M2 | HEART RATE: 90 BPM | HEIGHT: 64 IN | RESPIRATION RATE: 18 BRPM | DIASTOLIC BLOOD PRESSURE: 63 MMHG

## 2024-04-24 DIAGNOSIS — M62.81 GENERALIZED MUSCLE WEAKNESS: Primary | ICD-10-CM

## 2024-04-24 DIAGNOSIS — R29.6 FREQUENT FALLS: ICD-10-CM

## 2024-04-24 PROCEDURE — 3008F BODY MASS INDEX DOCD: CPT | Mod: CPTII,,, | Performed by: FAMILY MEDICINE

## 2024-04-24 PROCEDURE — 3044F HG A1C LEVEL LT 7.0%: CPT | Mod: CPTII,,, | Performed by: FAMILY MEDICINE

## 2024-04-24 PROCEDURE — 99213 OFFICE O/P EST LOW 20 MIN: CPT | Mod: ,,, | Performed by: FAMILY MEDICINE

## 2024-04-24 PROCEDURE — 3074F SYST BP LT 130 MM HG: CPT | Mod: CPTII,,, | Performed by: FAMILY MEDICINE

## 2024-04-24 PROCEDURE — 3078F DIAST BP <80 MM HG: CPT | Mod: CPTII,,, | Performed by: FAMILY MEDICINE

## 2024-04-24 PROCEDURE — 4010F ACE/ARB THERAPY RXD/TAKEN: CPT | Mod: CPTII,,, | Performed by: FAMILY MEDICINE

## 2024-04-24 PROCEDURE — 1160F RVW MEDS BY RX/DR IN RCRD: CPT | Mod: CPTII,,, | Performed by: FAMILY MEDICINE

## 2024-04-24 PROCEDURE — 1159F MED LIST DOCD IN RCRD: CPT | Mod: CPTII,,, | Performed by: FAMILY MEDICINE
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
## 2024-05-07 ENCOUNTER — CLINICAL SUPPORT (OUTPATIENT)
Dept: REHABILITATION | Facility: HOSPITAL | Age: 64
End: 2024-05-07
Attending: FAMILY MEDICINE
Payer: MEDICAID

## 2024-05-07 DIAGNOSIS — M62.81 GENERALIZED MUSCLE WEAKNESS: Primary | ICD-10-CM

## 2024-05-07 DIAGNOSIS — R29.6 FREQUENT FALLS: ICD-10-CM

## 2024-05-07 DIAGNOSIS — Z74.09 IMPAIRED FUNCTIONAL MOBILITY, BALANCE, GAIT, AND ENDURANCE: ICD-10-CM

## 2024-05-07 PROCEDURE — 97163 PT EVAL HIGH COMPLEX 45 MIN: CPT
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
## 2024-05-08 NOTE — PROGRESS NOTES
Physical Therapy Treatment Note     Name: Andree Mcclelland  Clinic Number: [id number]    Therapy Diagnosis:   Encounter Diagnoses   Name Primary?    Generalized muscle weakness Yes    Impaired functional mobility, balance, gait, and endurance     Frequent falls      Physician: Vish Resendez DO    Visit Date: 5/9/2024    Physician Orders: PT Eval and Treat  Medical Diagnosis from Referral: Generalized muscle weakness  Evaluation Date: 5/7/2024  Authorization Period Expiration: 6/14/24  Plan of Care Expiration: 8/7/2024  Visit # / Visits authorized: 1/ 24       Precautions: Frequent Falls     Time In: 1047  Time Out: 1128  Total Billable Time: 41 minutes    Subjective     Patient reports: Has been doing HEP. Some of the UE activities caused neck pain. She is having some cavitations in her neck, but they don't hurt.    CMS Impairment/Limitation/Restriction for FOTO Survey  Therapist reviewed FOTO scores for Andree Mcclelland on 5/7/2024. FOTO documents entered into [employer] - see Media section.  FOTO filled out by: Patient  Patient's Physical FS Primary Measure: 36  Risk Adjusted Statistical FOTO: 47 (predicted 51 by visit #10)  Limitation Score: 64%  Category: Mobility    Objective     Andree received the following treatment:     Time Activities   TherEx 41 min NuStep, SLR, SAQ, bridge with band clams, side lying hip abd, prone HS curl      Fwd step ups, fwd walking (3 speeds), sideways walking, squats, lateral step ups, treadmill walking             Home Exercises Provided and Patient Education Provided     Education provided:   -Plan of care, HEP, cavitations and neck mobility    Assessment     She performed all activities well this session, with appropriate degree of muscular fatigue. We are attempting to gradually increase demand and subsequently her activity capacity. She may hit a plateau given her history of COPD.    Patient prognosis is Good.      Anticipated barriers to physical therapy: Co-morbidities    Goals: Andree is  working towards her goals.  Short Term Goals: 6 weeks   Patient will report at least 10 point increase on initial FOTO score to indicate clinically significant functional improvement     Long Term Goals: 12 weeks   Patient will report at least 15 point increase on initial FOTO score to indicate clinically significant functional improvement  Patient will complete 10 laps during 6 minute walk test to show improved activity tolerance    Plan     2-3x/week x 12 weeks    Jermaine Louie, PT

## 2024-05-09 ENCOUNTER — CLINICAL SUPPORT (OUTPATIENT)
Dept: REHABILITATION | Facility: HOSPITAL | Age: 64
End: 2024-05-09
Payer: MEDICAID

## 2024-05-09 DIAGNOSIS — Z74.09 IMPAIRED FUNCTIONAL MOBILITY, BALANCE, GAIT, AND ENDURANCE: ICD-10-CM

## 2024-05-09 DIAGNOSIS — R29.6 FREQUENT FALLS: ICD-10-CM

## 2024-05-09 DIAGNOSIS — M62.81 GENERALIZED MUSCLE WEAKNESS: Primary | ICD-10-CM

## 2024-05-09 PROCEDURE — 97110 THERAPEUTIC EXERCISES: CPT

## 2024-05-14 ENCOUNTER — CLINICAL SUPPORT (OUTPATIENT)
Dept: REHABILITATION | Facility: HOSPITAL | Age: 64
End: 2024-05-14
Payer: MEDICAID

## 2024-05-14 DIAGNOSIS — M62.81 GENERALIZED MUSCLE WEAKNESS: Primary | ICD-10-CM

## 2024-05-14 DIAGNOSIS — Z74.09 IMPAIRED FUNCTIONAL MOBILITY, BALANCE, GAIT, AND ENDURANCE: ICD-10-CM

## 2024-05-14 DIAGNOSIS — R29.6 FREQUENT FALLS: ICD-10-CM

## 2024-05-14 PROCEDURE — 97110 THERAPEUTIC EXERCISES: CPT

## 2024-05-14 NOTE — PROGRESS NOTES
Physical Therapy Treatment Note     Name: Andree Mcclelland  Clinic Number: [id number]    Therapy Diagnosis:   Encounter Diagnoses   Name Primary?    Generalized muscle weakness Yes    Impaired functional mobility, balance, gait, and endurance     Frequent falls      Physician: Vish Resendez DO    Visit Date: 5/14/2024    Physician Orders: PT Eval and Treat  Medical Diagnosis from Referral: Generalized muscle weakness  Evaluation Date: 5/7/2024  Authorization Period Expiration: 6/14/24  Plan of Care Expiration: 8/7/2024  Visit # / Visits authorized: 2/ 24       Precautions: Frequent Falls     Time In: 1053  Time Out: 1134  Total Billable Time: 41 minutes    Subjective     Patient reports: Has been doing HEP. She felt sore/LE fatigue after exercise in clinic but a good sore like she did useful activity.    CMS Impairment/Limitation/Restriction for FOTO Survey  Therapist reviewed FOTO scores for Andree Mcclelland on 5/7/2024. FOTO documents entered into [employer] - see Media section.  FOTO filled out by: Patient  Patient's Physical FS Primary Measure: 36  Risk Adjusted Statistical FOTO: 47 (predicted 51 by visit #10)  Limitation Score: 64%  Category: Mobility    Objective     Andree received the following treatment:     Time Activities   TherEx 41 min NuStep, SLR, SAQ, bridge with band clams, side lying hip abd, prone HS curl      Fwd step ups, fwd walking (3 speeds), sideways walking, squats, lateral step ups, treadmill walking             Home Exercises Provided and Patient Education Provided     Education provided:   -Plan of care, HEP, cavitations and neck mobility    Assessment     She performed all activities well this session, with appropriate degree of muscular fatigue. We are attempting to gradually increase demand and subsequently her activity capacity. She may hit a plateau given her history of COPD.    Patient prognosis is Good.      Anticipated barriers to physical therapy: Co-morbidities    Goals: Andree is working  towards her goals.  Short Term Goals: 6 weeks   Patient will report at least 10 point increase on initial FOTO score to indicate clinically significant functional improvement     Long Term Goals: 12 weeks   Patient will report at least 15 point increase on initial FOTO score to indicate clinically significant functional improvement  Patient will complete 10 laps during 6 minute walk test to show improved activity tolerance    Plan     2-3x/week x 12 weeks    Jermaine Louie, PT

## 2024-05-16 ENCOUNTER — CLINICAL SUPPORT (OUTPATIENT)
Dept: REHABILITATION | Facility: HOSPITAL | Age: 64
End: 2024-05-16
Payer: MEDICAID

## 2024-05-16 DIAGNOSIS — R29.6 FREQUENT FALLS: ICD-10-CM

## 2024-05-16 DIAGNOSIS — M62.81 GENERALIZED MUSCLE WEAKNESS: Primary | ICD-10-CM

## 2024-05-16 DIAGNOSIS — Z74.09 IMPAIRED FUNCTIONAL MOBILITY, BALANCE, GAIT, AND ENDURANCE: ICD-10-CM

## 2024-05-16 PROCEDURE — 97110 THERAPEUTIC EXERCISES: CPT

## 2024-05-16 NOTE — PROGRESS NOTES
Physical Therapy Treatment Note     Name: Andree Mcclelland  Clinic Number: [id number]    Therapy Diagnosis:   Encounter Diagnoses   Name Primary?    Generalized muscle weakness Yes    Impaired functional mobility, balance, gait, and endurance     Frequent falls      Physician: Vish Resendez DO    Visit Date: 5/16/2024    Physician Orders: PT Eval and Treat  Medical Diagnosis from Referral: Generalized muscle weakness  Evaluation Date: 5/7/2024  Authorization Period Expiration: 6/14/24  Plan of Care Expiration: 8/7/2024  Visit # / Visits authorized: 3/ 24       Precautions: Frequent Falls     Time In: 1052  Time Out: 1133  Total Billable Time: 41 minutes    Subjective     Patient reports: She was not nearly as sore after previous session, and she feels the improvement in her functional strength.    CMS Impairment/Limitation/Restriction for FOTO Survey  Therapist reviewed FOTO scores for Andree Mcclelland on 5/7/2024. FOTO documents entered into [employer] - see Media section.  FOTO filled out by: Patient  Patient's Physical FS Primary Measure: 36  Risk Adjusted Statistical FOTO: 47 (predicted 51 by visit #10)  Limitation Score: 64%  Category: Mobility    Objective     Andree received the following treatment:     Time Activities   TherEx 41 min NuStep, SLR, SAQ, bridge with band clams, side lying hip abd, prone HS curl      Fwd step ups, fwd walking (3 speeds), sideways walking, squats, lateral step ups, treadmill walking             Home Exercises Provided and Patient Education Provided     Education provided:   -Plan of care, HEP, cavitations and neck mobility    Assessment     She performed all activities well this session, with appropriate degree of muscular fatigue. We are attempting to gradually increase demand and subsequently her activity capacity. She may hit a plateau given her history of COPD.    Patient prognosis is Good.      Anticipated barriers to physical therapy: Co-morbidities    Goals: Andree is working towards her  goals.  Short Term Goals: 6 weeks   Patient will report at least 10 point increase on initial FOTO score to indicate clinically significant functional improvement     Long Term Goals: 12 weeks   Patient will report at least 15 point increase on initial FOTO score to indicate clinically significant functional improvement  Patient will complete 10 laps during 6 minute walk test to show improved activity tolerance    Plan     2-3x/week x 12 weeks    Jermaine Louie, PT

## 2024-05-21 ENCOUNTER — CLINICAL SUPPORT (OUTPATIENT)
Dept: REHABILITATION | Facility: HOSPITAL | Age: 64
End: 2024-05-21
Payer: MEDICAID

## 2024-05-21 DIAGNOSIS — Z74.09 IMPAIRED FUNCTIONAL MOBILITY, BALANCE, GAIT, AND ENDURANCE: ICD-10-CM

## 2024-05-21 DIAGNOSIS — M62.81 GENERALIZED MUSCLE WEAKNESS: Primary | ICD-10-CM

## 2024-05-21 PROCEDURE — 97110 THERAPEUTIC EXERCISES: CPT

## 2024-05-21 NOTE — PROGRESS NOTES
Physical Therapy Treatment Note     Name: Andree Mcclelland  Clinic Number: [id number]    Therapy Diagnosis:   Encounter Diagnoses   Name Primary?    Generalized muscle weakness Yes    Impaired functional mobility, balance, gait, and endurance      Physician: Vish Resendez DO    Visit Date: 5/21/2024    Physician Orders: PT Eval and Treat  Medical Diagnosis from Referral: Generalized muscle weakness  Evaluation Date: 5/7/2024  Authorization Period Expiration: 6/14/24  Plan of Care Expiration: 8/7/2024  Visit # / Visits authorized: 4/ 24       Precautions: Frequent Falls     Time In: 1050  Time Out: 1148  Total Billable Time: 58 minutes    Subjective     Patient reports: She was not nearly as sore after previous session, and she feels the improvement in her functional strength.    CMS Impairment/Limitation/Restriction for FOTO Survey  Therapist reviewed FOTO scores for Andree Mcclelland on 5/7/2024. FOTO documents entered into [employer] - see Media section.  FOTO filled out by: Patient  Patient's Physical FS Primary Measure: 36  Risk Adjusted Statistical FOTO: 47 (predicted 51 by visit #10)  Limitation Score: 64%  Category: Mobility    Objective     Andree received the following treatment:     Time Activities   TherEx 58 min NuStep, SLR, SAQ, side lying hip abd, prone HS curl      Fwd step ups, sideways walking, sit<>stand no UEs, lateral step ups, treadmill walking             Home Exercises Provided and Patient Education Provided     Education provided:   -Plan of care, HEP, cavitations and neck mobility    Assessment     She performed all activities well this session, with appropriate degree of muscular fatigue. We are attempting to gradually increase demand and subsequently her activity capacity. She may hit a plateau given her history of COPD.    Patient prognosis is Good.      Anticipated barriers to physical therapy: Co-morbidities    Goals: Andree is working towards her goals.  Short Term Goals: 6 weeks   Patient will report  at least 10 point increase on initial FOTO score to indicate clinically significant functional improvement     Long Term Goals: 12 weeks   Patient will report at least 15 point increase on initial FOTO score to indicate clinically significant functional improvement  Patient will complete 10 laps during 6 minute walk test to show improved activity tolerance    Plan     2-3x/week x 12 weeks    Jermaine Louie, PT

## 2024-05-23 ENCOUNTER — CLINICAL SUPPORT (OUTPATIENT)
Dept: REHABILITATION | Facility: HOSPITAL | Age: 64
End: 2024-05-23
Payer: MEDICAID

## 2024-05-23 DIAGNOSIS — M62.81 GENERALIZED MUSCLE WEAKNESS: Primary | ICD-10-CM

## 2024-05-23 DIAGNOSIS — Z74.09 IMPAIRED FUNCTIONAL MOBILITY, BALANCE, GAIT, AND ENDURANCE: ICD-10-CM

## 2024-05-23 DIAGNOSIS — R29.6 FREQUENT FALLS: ICD-10-CM

## 2024-05-23 PROCEDURE — 97110 THERAPEUTIC EXERCISES: CPT

## 2024-05-23 NOTE — PROGRESS NOTES
Physical Therapy Treatment Note     Name: Andree Mcclelland  Clinic Number: [id number]    Therapy Diagnosis:   Encounter Diagnoses   Name Primary?    Generalized muscle weakness Yes    Impaired functional mobility, balance, gait, and endurance     Frequent falls      Physician: Vish Resendez DO    Visit Date: 5/23/2024    Physician Orders: PT Eval and Treat  Medical Diagnosis from Referral: Generalized muscle weakness  Evaluation Date: 5/7/2024  Authorization Period Expiration: 6/14/24  Plan of Care Expiration: 8/7/2024  Visit # / Visits authorized: 5/ 24       Precautions: Frequent Falls     Time In: 1100  Time Out: 1148  Total Billable Time: 48 minutes    Subjective     Patient reports: Had some pain after previous session. Overall she is seeing significant improvement in her functional endurance and walking capacity.    CMS Impairment/Limitation/Restriction for FOTO Survey  Therapist reviewed FOTO scores for Andree Mcclelland on 5/7/2024. FOTO documents entered into [employer] - see Media section.  FOTO filled out by: Patient  Patient's Physical FS Primary Measure: 36  Risk Adjusted Statistical FOTO: 47 (predicted 51 by visit #10)  Limitation Score: 64%  Category: Mobility    Objective     Andree received the following treatment:     Time Activities   TherEx 48 min NuStep, SLR, SAQ    Treadmill walking intervals             Home Exercises Provided and Patient Education Provided     Education provided:   -Plan of care, HEP, cavitations and neck mobility    Assessment     She performed all activities well this session, with appropriate degree of muscular fatigue. We are attempting to gradually increase demand and subsequently her activity capacity. She may hit a plateau given her history of COPD.    Patient prognosis is Good.      Anticipated barriers to physical therapy: Co-morbidities    Goals: Andree is working towards her goals.  Short Term Goals: 6 weeks   Patient will report at least 10 point increase on initial FOTO score to  indicate clinically significant functional improvement     Long Term Goals: 12 weeks   Patient will report at least 15 point increase on initial FOTO score to indicate clinically significant functional improvement  Patient will complete 10 laps during 6 minute walk test to show improved activity tolerance    Plan     2-3x/week x 12 weeks    Jermaine Louie, PT

## 2024-05-30 ENCOUNTER — CLINICAL SUPPORT (OUTPATIENT)
Dept: REHABILITATION | Facility: HOSPITAL | Age: 64
End: 2024-05-30
Payer: MEDICAID

## 2024-05-30 DIAGNOSIS — M62.81 GENERALIZED MUSCLE WEAKNESS: Primary | ICD-10-CM

## 2024-05-30 DIAGNOSIS — R29.6 FREQUENT FALLS: ICD-10-CM

## 2024-05-30 DIAGNOSIS — Z74.09 IMPAIRED FUNCTIONAL MOBILITY, BALANCE, GAIT, AND ENDURANCE: ICD-10-CM

## 2024-05-30 PROCEDURE — 97110 THERAPEUTIC EXERCISES: CPT

## 2024-05-30 NOTE — PROGRESS NOTES
Physical Therapy Treatment Note     Name: Andree Mcclelland  Clinic Number: [id number]    Therapy Diagnosis:   Encounter Diagnoses   Name Primary?    Generalized muscle weakness Yes    Impaired functional mobility, balance, gait, and endurance     Frequent falls      Physician: Vish Resendez DO    Visit Date: 5/30/2024    Physician Orders: PT Eval and Treat  Medical Diagnosis from Referral: Generalized muscle weakness  Evaluation Date: 5/7/2024  Authorization Period Expiration: 6/14/24  Plan of Care Expiration: 8/7/2024  Visit # / Visits authorized: 6/ 24       Precautions: Frequent Falls     Time In: 1053  Time Out: 1139  Total Billable Time: 46 minutes    Subjective     Patient reports: She has been feeling so good that she does more and attempts gardening. Unfortunately she fell in garden the other day and has bruises over her arms/legs.    CMS Impairment/Limitation/Restriction for FOTO Survey  Therapist reviewed FOTO scores for Andree Mcclelland on 5/7/2024. FOTO documents entered into [employer] - see Media section.  FOTO filled out by: Patient  Patient's Physical FS Primary Measure: 36  Risk Adjusted Statistical FOTO: 47 (predicted 51 by visit #10)  Limitation Score: 64%  Category: Mobility    Therapist reviewed FOTO scores for Andree Mcclelland on 5/28/2024. FOTO documents entered into [employer] - see Media section.  FOTO filled out by: Patient  Patient's Physical FS Primary Measure: 33  Risk Adjusted Statistical FOTO: 47 (predicted 51 by visit #10)  Limitation Score: 67%  Category: Mobility    Objective     Andree received the following treatment:     Time Activities   TherEx 45 min NuStep, SLR, SAQ, side lying hip abduction, prone HS curls    Fwd steps ups, lat step ups, lateral band walking,sit<>stand no UEs             Home Exercises Provided and Patient Education Provided     Education provided:   -Plan of care, HEP, cavitations and neck mobility    Assessment     She performed all activities well this session, with  appropriate degree of muscular fatigue. We are attempting to gradually increase demand and subsequently her activity capacity. She may hit a plateau given her history of COPD, however we find it imperative that she continue with supervised activity via PT to allow for a safe and gradual progression.    Of note, her FOTO score dropped 3 points to indicate a functional regression, however this is contradictory to her subjective report of functional improvement and confidence in her ability that has made her do more at home.    Patient prognosis is Good.      Anticipated barriers to physical therapy: Co-morbidities    Goals: Andree is working towards her goals.  Short Term Goals: 6 weeks   Patient will report at least 10 point increase on initial FOTO score to indicate clinically significant functional improvement     Long Term Goals: 12 weeks   Patient will report at least 15 point increase on initial FOTO score to indicate clinically significant functional improvement  Patient will complete 10 laps during 6 minute walk test to show improved activity tolerance    Plan     2-3x/week x 12 weeks    Jermaine Louie, PT

## 2024-06-03 DIAGNOSIS — F32.5 MAJOR DEPRESSION IN REMISSION: ICD-10-CM

## 2024-06-03 DIAGNOSIS — E78.2 MIXED HYPERLIPIDEMIA: ICD-10-CM

## 2024-06-03 RX ORDER — ESCITALOPRAM OXALATE 10 MG/1
10 TABLET ORAL
Qty: 90 TABLET | Refills: 1 | Status: SHIPPED | OUTPATIENT
Start: 2024-06-03

## 2024-06-03 RX ORDER — LOVASTATIN 40 MG/1
40 TABLET ORAL
Qty: 90 TABLET | Refills: 1 | Status: SHIPPED | OUTPATIENT
Start: 2024-06-03

## 2024-06-04 ENCOUNTER — CLINICAL SUPPORT (OUTPATIENT)
Dept: REHABILITATION | Facility: HOSPITAL | Age: 64
End: 2024-06-04
Payer: MEDICAID

## 2024-06-04 DIAGNOSIS — M62.81 GENERALIZED MUSCLE WEAKNESS: Primary | ICD-10-CM

## 2024-06-04 DIAGNOSIS — Z74.09 IMPAIRED FUNCTIONAL MOBILITY, BALANCE, GAIT, AND ENDURANCE: ICD-10-CM

## 2024-06-04 DIAGNOSIS — R29.6 FREQUENT FALLS: ICD-10-CM

## 2024-06-04 PROCEDURE — 97110 THERAPEUTIC EXERCISES: CPT

## 2024-06-04 NOTE — PROGRESS NOTES
Physical Therapy Treatment Note     Name: Andree Mcclelland  Clinic Number: [id number]    Therapy Diagnosis:   Encounter Diagnoses   Name Primary?    Generalized muscle weakness Yes    Impaired functional mobility, balance, gait, and endurance     Frequent falls      Physician: Vish Resendez DO    Visit Date: 6/4/2024    Physician Orders: PT Eval and Treat  Medical Diagnosis from Referral: Generalized muscle weakness  Evaluation Date: 5/7/2024  Authorization Period Expiration: 6/14/24  Plan of Care Expiration: 8/7/2024  Visit # / Visits authorized: 7/ 24       Precautions: Frequent Falls     Time In: 1058  Time Out: 1146  Total Billable Time: 48 minutes    Subjective     Patient reports: She is feeling great today. No adverse reports.    CMS Impairment/Limitation/Restriction for FOTO Survey  Therapist reviewed FOTO scores for Andree Mcclelland on 5/7/2024. FOTO documents entered into [employer] - see Media section.  FOTO filled out by: Patient  Patient's Physical FS Primary Measure: 36  Risk Adjusted Statistical FOTO: 47 (predicted 51 by visit #10)  Limitation Score: 64%  Category: Mobility    Therapist reviewed FOTO scores for Andree Mcclelland on 5/28/2024. FOTO documents entered into [employer] - see Media section.  FOTO filled out by: Patient  Patient's Physical FS Primary Measure: 33  Risk Adjusted Statistical FOTO: 47 (predicted 51 by visit #10)  Limitation Score: 67%  Category: Mobility    Objective     Andree received the following treatment:     Time Activities   TherEx 48 min NuStep, SLR, side lying hip abduction, prone HS curls      Band UE bilateral (ext, horz rows, ER)      Sit<>stand no UEs, Treadmill walking             Home Exercises Provided and Patient Education Provided     Education provided:   -Plan of care, HEP, cavitations and neck mobility    Assessment     She performed all activities well this session, with appropriate degree of muscular fatigue. We are attempting to gradually increase demand and subsequently her  activity capacity. She may hit a plateau given her history of COPD, however we find it imperative that she continue with supervised activity via PT to allow for a safe and gradual progression.    Of note, her FOTO score dropped 3 points to indicate a functional regression, however this is contradictory to her subjective report of functional improvement and confidence in her ability that has made her do more at home.    Patient prognosis is Good.      Anticipated barriers to physical therapy: Co-morbidities    Goals: Andree is working towards her goals.  Short Term Goals: 6 weeks   Patient will report at least 10 point increase on initial FOTO score to indicate clinically significant functional improvement     Long Term Goals: 12 weeks   Patient will report at least 15 point increase on initial FOTO score to indicate clinically significant functional improvement  Patient will complete 10 laps during 6 minute walk test to show improved activity tolerance    Plan     2-3x/week x 12 weeks    Jermaine Louie, PT

## 2024-06-05 NOTE — PROGRESS NOTES
Physical Therapy Treatment Note     Name: Andree Mcclelland  Clinic Number: [id number]    Therapy Diagnosis:   Encounter Diagnoses   Name Primary?    Generalized muscle weakness Yes    Impaired functional mobility, balance, gait, and endurance     Frequent falls      Physician: Vish Resendez DO    Visit Date: 6/6/2024    Physician Orders: PT Eval and Treat  Medical Diagnosis from Referral: Generalized muscle weakness  Evaluation Date: 5/7/2024  Authorization Period Expiration: 6/14/24  Plan of Care Expiration: 8/7/2024  Visit # / Visits authorized: 8/ 24       Precautions: Frequent Falls     Time In: 1100  Time Out: 1125  Total Billable Time: 25 minutes    Subjective     Patient reports: She is feeling a bit tired today, did not rest well or have a good night.    CMS Impairment/Limitation/Restriction for FOTO Survey  Therapist reviewed FOTO scores for Andree Mcclelland on 5/7/2024. FOTO documents entered into [employer] - see Media section.  FOTO filled out by: Patient  Patient's Physical FS Primary Measure: 36  Risk Adjusted Statistical FOTO: 47 (predicted 51 by visit #10)  Limitation Score: 64%  Category: Mobility    Therapist reviewed FOTO scores for Andree Mcclelland on 5/28/2024. FOTO documents entered into [employer] - see Media section.  FOTO filled out by: Patient  Patient's Physical FS Primary Measure: 33  Risk Adjusted Statistical FOTO: 47 (predicted 51 by visit #10)  Limitation Score: 67%  Category: Mobility    Objective     Andree received the following treatment:     Time Activities   TherEx 25 min NuStep, Treadmill walking        Held today  SLR, side lying hip abduction, prone HS curls  Band UE bilateral (ext, horz rows, ER)  Sit<>stand no UEs, Treadmill walking             Home Exercises Provided and Patient Education Provided     Education provided:   -Plan of care, HEP, cavitations and neck mobility    Assessment     She performed all activities well this session, with appropriate degree of muscular fatigue. We are  attempting to gradually increase demand and subsequently her activity capacity. She may hit a plateau given her history of COPD, however we find it imperative that she continue with supervised activity via PT to allow for a safe and gradual progression.    Of note, her FOTO score dropped 3 points to indicate a functional regression, however this is contradictory to her subjective report of functional improvement and confidence in her ability that has made her do more at home.    Patient prognosis is Good.      Anticipated barriers to physical therapy: Co-morbidities    Goals: Andree is working towards her goals.  Short Term Goals: 6 weeks   Patient will report at least 10 point increase on initial FOTO score to indicate clinically significant functional improvement     Long Term Goals: 12 weeks   Patient will report at least 15 point increase on initial FOTO score to indicate clinically significant functional improvement  Patient will complete 10 laps during 6 minute walk test to show improved activity tolerance    Plan     2-3x/week x 12 weeks    Jermaine Louie, PT

## 2024-06-06 ENCOUNTER — CLINICAL SUPPORT (OUTPATIENT)
Dept: REHABILITATION | Facility: HOSPITAL | Age: 64
End: 2024-06-06
Payer: MEDICAID

## 2024-06-06 DIAGNOSIS — M62.81 GENERALIZED MUSCLE WEAKNESS: Primary | ICD-10-CM

## 2024-06-06 DIAGNOSIS — R29.6 FREQUENT FALLS: ICD-10-CM

## 2024-06-06 DIAGNOSIS — Z74.09 IMPAIRED FUNCTIONAL MOBILITY, BALANCE, GAIT, AND ENDURANCE: ICD-10-CM

## 2024-06-06 PROCEDURE — 97110 THERAPEUTIC EXERCISES: CPT

## 2024-06-10 NOTE — PROGRESS NOTES
Physical Therapy Treatment Note     Name: Andree Mcclelland  Clinic Number: [id number]    Therapy Diagnosis:   Encounter Diagnoses   Name Primary?    Generalized muscle weakness Yes    Impaired functional mobility, balance, gait, and endurance     Frequent falls      Physician: Vish Resendez DO    Visit Date: 6/11/2024    Physician Orders: PT Eval and Treat  Medical Diagnosis from Referral: Generalized muscle weakness  Evaluation Date: 5/7/2024  Authorization Period Expiration: 6/14/24  Plan of Care Expiration: 8/7/2024  Visit # / Visits authorized: 8/ 24       Precautions: Frequent Falls     Time In: 1100  Time Out: 1126  Total Billable Time: 26 minutes    Subjective     Patient reports: No adverse reports. Overall she feels an improvement in her overall functional capacity.    CMS Impairment/Limitation/Restriction for FOTO Survey  Therapist reviewed FOTO scores for Andree Mcclelland on 5/7/2024. FOTO documents entered into [employer] - see Media section.  FOTO filled out by: Patient  Patient's Physical FS Primary Measure: 36  Risk Adjusted Statistical FOTO: 47 (predicted 51 by visit #10)  Limitation Score: 64%  Category: Mobility    Therapist reviewed FOTO scores for Andree Mcclelland on 5/28/2024. FOTO documents entered into [employer] - see Media section.  FOTO filled out by: Patient  Patient's Physical FS Primary Measure: 33  Risk Adjusted Statistical FOTO: 47 (predicted 51 by visit #10)  Limitation Score: 67%  Category: Mobility    Objective     Andree received the following treatment:     Time Activities   TherEx 26 min NuStep, Treadmill walking, fwd step ups        Held today  SLR, side lying hip abduction, prone HS curls  Band UE bilateral (ext, horz rows, ER)  Sit<>stand no UEs, Treadmill walking             Home Exercises Provided and Patient Education Provided     Education provided:   -Plan of care, HEP, cavitations and neck mobility    Assessment     She continues to perform all activities well in clinic, with appropriate  degree of muscular and cardiovascular fatigue. We are attempting to gradually increase demand and subsequently her activity capacity. She has waxing and waning performance, with some days of performance being better than others. She may hit a plateau given her history of COPD, however we find it imperative that she continue with supervised activity via PT to allow for a safe and gradual progression to reach her maximal safe functional capacity.    Of note, her FOTO score dropped 3 points to indicate a functional regression, however this is contradictory to her subjective report of functional improvement and confidence in her ability that has made her do more at home.    Patient prognosis is Good.      Anticipated barriers to physical therapy: Co-morbidities    Goals: Andree is working towards her goals.  Short Term Goals: 6 weeks   Patient will report at least 10 point increase on initial FOTO score to indicate clinically significant functional improvement     Long Term Goals: 12 weeks   Patient will report at least 15 point increase on initial FOTO score to indicate clinically significant functional improvement  Patient will complete 10 laps during 6 minute walk test to show improved activity tolerance    Plan     2-3x/week x 12 weeks    Jermaine Louie, PT

## 2024-06-11 ENCOUNTER — CLINICAL SUPPORT (OUTPATIENT)
Dept: REHABILITATION | Facility: HOSPITAL | Age: 64
End: 2024-06-11
Payer: MEDICAID

## 2024-06-11 DIAGNOSIS — M62.81 GENERALIZED MUSCLE WEAKNESS: Primary | ICD-10-CM

## 2024-06-11 DIAGNOSIS — Z74.09 IMPAIRED FUNCTIONAL MOBILITY, BALANCE, GAIT, AND ENDURANCE: ICD-10-CM

## 2024-06-11 DIAGNOSIS — R29.6 FREQUENT FALLS: ICD-10-CM

## 2024-06-11 PROCEDURE — 97110 THERAPEUTIC EXERCISES: CPT

## 2024-06-12 NOTE — PROGRESS NOTES
Physical Therapy Treatment Note     Name: Andree Mcclelland  Clinic Number: [id number]    Therapy Diagnosis:   Encounter Diagnoses   Name Primary?    Generalized muscle weakness Yes    Impaired functional mobility, balance, gait, and endurance     Frequent falls      Physician: Vish Resendez DO    Visit Date: 6/13/2024    Physician Orders: PT Eval and Treat  Medical Diagnosis from Referral: Generalized muscle weakness  Evaluation Date: 5/7/2024  Authorization Period Expiration: 6/14/24  Plan of Care Expiration: 8/7/2024  Visit # / Visits authorized: 8/ 24       Precautions: Frequent Falls     Time In: 1052  Time Out: 1135  Total Billable Time: 43 minutes    Subjective     Patient reports: No adverse reports. Overall she feels an improvement in her overall functional capacity.    CMS Impairment/Limitation/Restriction for FOTO Survey  Therapist reviewed FOTO scores for Andree Mcclelland on 5/7/2024. FOTO documents entered into EPIC - see Media section.  FOTO filled out by: Patient  Patient's Physical FS Primary Measure: 36  Risk Adjusted Statistical FOTO: 47 (predicted 51 by visit #10)  Limitation Score: 64%  Category: Mobility    Therapist reviewed FOTO scores for Andree Mcclelland on 5/28/2024. FOTO documents entered into EPIC - see Media section.  FOTO filled out by: Patient  Patient's Physical FS Primary Measure: 33  Risk Adjusted Statistical FOTO: 47 (predicted 51 by visit #10)  Limitation Score: 67%  Category: Mobility    Objective     Andree received the following treatment:     Time Activities   TherEx 43 min NuStep, 6-minute walk, fwd step ups, lateral step ups, sit<>stand no UEs               Evaluation  Standing level/firm surface eyes open: Normal  Standing level/firm surface eyes closed: Normal  Romberg Stance: swaying, but able to hold  Tandem stance (eyes open): swaying and unable to hold  Tandem stance (eyes closed): swaying and unable to hold  Single leg stance left: swaying and unable to hold  Single leg stance  right: swaying and unable to hold    Active shoulder flexion right - 95 deg,   Active assisted shoulder flexion right - 150 deg     6 minute walk test - Only able to get to 4 minutes, 6.5 laps completed      6/13/2024  Standing level/firm surface eyes open: Normal  Standing level/firm surface eyes closed: Normal  Romberg Stance: swaying, but able to hold  Tandem stance (eyes open): 1 sec  Tandem stance (eyes closed): 1 sec  Single leg stance left: 5 sec  Single leg stance right: 4 sec    Active shoulder flexion right (shoulder press sagittal plane) - 180 deg  Active shoulder flexion right (shoulder press frontal plane) - 180 deg    6 minute walk test - Full 6 minutes 8 laps completed    Home Exercises Provided and Patient Education Provided     Education provided:   -Plan of care, HEP, cavitations and neck mobility    Assessment     She continues to perform all activities well in clinic, with appropriate degree of muscular and cardiovascular fatigue. We are attempting to gradually increase demand and subsequently her activity capacity. She has waxing and waning performance, with some days of performance being better than others. She may hit a plateau given her history of COPD, however we find it imperative that she continue with supervised activity via PT to allow for a safe and gradual progression to reach her maximal safe functional capacity.    Of note, her FOTO score dropped 3 points to indicate a functional regression, however this is contradictory to her subjective report of functional improvement and confidence in her ability that has made her do more at home.    Patient prognosis is Good.      Anticipated barriers to physical therapy: Co-morbidities    Goals: Andree is working towards her goals.  Short Term Goals: 6 weeks   Patient will report at least 10 point increase on initial FOTO score to indicate clinically significant functional improvement     Long Term Goals: 12 weeks   Patient will report at least 15  point increase on initial FOTO score to indicate clinically significant functional improvement  Patient will complete 10 laps during 6 minute walk test to show improved activity tolerance    Plan     2-3x/week x 12 weeks    Jermaine Louie, PT

## 2024-06-13 ENCOUNTER — CLINICAL SUPPORT (OUTPATIENT)
Dept: REHABILITATION | Facility: HOSPITAL | Age: 64
End: 2024-06-13
Payer: MEDICAID

## 2024-06-13 DIAGNOSIS — Z74.09 IMPAIRED FUNCTIONAL MOBILITY, BALANCE, GAIT, AND ENDURANCE: ICD-10-CM

## 2024-06-13 DIAGNOSIS — M62.81 GENERALIZED MUSCLE WEAKNESS: Primary | ICD-10-CM

## 2024-06-13 DIAGNOSIS — R29.6 FREQUENT FALLS: ICD-10-CM

## 2024-06-13 PROCEDURE — 97110 THERAPEUTIC EXERCISES: CPT

## 2024-06-18 ENCOUNTER — CLINICAL SUPPORT (OUTPATIENT)
Dept: REHABILITATION | Facility: HOSPITAL | Age: 64
End: 2024-06-18
Payer: MEDICAID

## 2024-06-18 DIAGNOSIS — Z74.09 IMPAIRED FUNCTIONAL MOBILITY, BALANCE, GAIT, AND ENDURANCE: ICD-10-CM

## 2024-06-18 DIAGNOSIS — R29.6 FREQUENT FALLS: ICD-10-CM

## 2024-06-18 DIAGNOSIS — M62.81 GENERALIZED MUSCLE WEAKNESS: Primary | ICD-10-CM

## 2024-06-18 PROCEDURE — 97110 THERAPEUTIC EXERCISES: CPT

## 2024-06-18 NOTE — PROGRESS NOTES
Physical Therapy Treatment Note     Name: Andree Mcclelland  Clinic Number: [id number]    Therapy Diagnosis:   Encounter Diagnoses   Name Primary?    Generalized muscle weakness Yes    Impaired functional mobility, balance, gait, and endurance     Frequent falls      Physician: Vish Resendez DO    Visit Date: 6/18/2024    Physician Orders: PT Eval and Treat  Medical Diagnosis from Referral: Generalized muscle weakness  Evaluation Date: 5/7/2024  Authorization Period Expiration: 9/7/24  Plan of Care Expiration: 8/7/2024  Visit # / Visits authorized: 11/ 24       Precautions: Frequent Falls     Time In: 1050  Time Out: 1133  Total Billable Time: 43 minutes    Subjective     Patient reports: No adverse reports. Overall she feels an improvement in her overall functional capacity.    CMS Impairment/Limitation/Restriction for FOTO Survey  Therapist reviewed FOTO scores for Andree Mcclelland on 5/7/2024. FOTO documents entered into EPIC - see Media section.  FOTO filled out by: Patient  Patient's Physical FS Primary Measure: 36  Risk Adjusted Statistical FOTO: 47 (predicted 51 by visit #10)  Limitation Score: 64%  Category: Mobility    Therapist reviewed FOTO scores for Andree Mcclelland on 5/28/2024. FOTO documents entered into EPIC - see Media section.  FOTO filled out by: Patient  Patient's Physical FS Primary Measure: 33  Risk Adjusted Statistical FOTO: 47 (predicted 51 by visit #10)  Limitation Score: 67%  Category: Mobility    Objective     Andree received the following treatment:     Time Activities   TherEx 43 min NuStep, 6-minute walk (treadmill), sit<>stand no UEs    BUE  Band shld ext (standing on therapad), weighted shoulder flx to 90 deg, weighted wall wipes (flx)                 Home Exercises Provided and Patient Education Provided     Education provided:   -Plan of care, HEP, cavitations and neck mobility    Assessment     She continues to perform all activities well in clinic, with appropriate degree of muscular and  cardiovascular fatigue. We are attempting to gradually increase demand and subsequently her activity capacity, now including more UE work. She has waxing and waning performance, with some days of performance being better than others. She may hit a plateau given her history of COPD, however we find it imperative that she continue with supervised activity via PT to allow for a safe and gradual progression to reach her maximal safe functional capacity.    Of note, her FOTO score dropped 3 points to indicate a functional regression, however this is contradictory to her subjective report of functional improvement and confidence in her ability that has made her do more at home.    Patient prognosis is Good.      Anticipated barriers to physical therapy: Co-morbidities    Goals: Andree is working towards her goals.  Short Term Goals: 6 weeks   Patient will report at least 10 point increase on initial FOTO score to indicate clinically significant functional improvement     Long Term Goals: 12 weeks   Patient will report at least 15 point increase on initial FOTO score to indicate clinically significant functional improvement  Patient will complete 10 laps during 6 minute walk test to show improved activity tolerance    Plan     2-3x/week x 12 weeks    Jermaine Louie, PT

## 2024-06-20 ENCOUNTER — CLINICAL SUPPORT (OUTPATIENT)
Dept: REHABILITATION | Facility: HOSPITAL | Age: 64
End: 2024-06-20
Payer: MEDICAID

## 2024-06-20 DIAGNOSIS — Z74.09 IMPAIRED FUNCTIONAL MOBILITY, BALANCE, GAIT, AND ENDURANCE: ICD-10-CM

## 2024-06-20 DIAGNOSIS — M62.81 GENERALIZED MUSCLE WEAKNESS: Primary | ICD-10-CM

## 2024-06-20 DIAGNOSIS — R29.6 FREQUENT FALLS: ICD-10-CM

## 2024-06-20 PROCEDURE — 97110 THERAPEUTIC EXERCISES: CPT

## 2024-06-20 NOTE — PROGRESS NOTES
Physical Therapy Treatment Note     Name: Andree Mcclelland  Clinic Number: [id number]    Therapy Diagnosis:   Encounter Diagnoses   Name Primary?    Generalized muscle weakness Yes    Impaired functional mobility, balance, gait, and endurance     Frequent falls      Physician: Vish Resendez DO    Visit Date: 6/20/2024    Physician Orders: PT Eval and Treat  Medical Diagnosis from Referral: Generalized muscle weakness  Evaluation Date: 5/7/2024  Authorization Period Expiration: 9/7/24  Plan of Care Expiration: 8/7/2024  Visit # / Visits authorized: 12/ 24       Precautions: Frequent Falls     Time In: 1052  Time Out: 1121  Total Billable Time: 29 minutes    Subjective     Patient reports: No adverse reports. Overall she feels an improvement in her overall functional capacity.    CMS Impairment/Limitation/Restriction for FOTO Survey  Therapist reviewed FOTO scores for Andree Mcclelland on 5/7/2024. FOTO documents entered into EPIC - see Media section.  FOTO filled out by: Patient  Patient's Physical FS Primary Measure: 36  Risk Adjusted Statistical FOTO: 47 (predicted 51 by visit #10)  Limitation Score: 64%  Category: Mobility    Therapist reviewed FOTO scores for Andree Mcclelland on 5/28/2024. FOTO documents entered into EPIC - see Media section.  FOTO filled out by: Patient  Patient's Physical FS Primary Measure: 33  Risk Adjusted Statistical FOTO: 47 (predicted 51 by visit #10)  Limitation Score: 67%  Category: Mobility    Therapist reviewed FOTO scores for Andree Mcclelland on 6/20/2024. FOTO documents entered into EPIC - see Media section.  FOTO filled out by: Patient  Patient's Physical FS Primary Measure: 43  Risk Adjusted Statistical FOTO: 47 (predicted 51 by visit #10)  Limitation Score: 57%  Category: Mobility      Objective     Andree received the following treatment:     Time Activities   TherEx 29 min NuStep, 5-minute walk with weights on UEs, sit<>stand no UEs      Held today  BUE  Band shld ext (standing on  therapad), weighted shoulder flx to 90 deg, weighted wall wipes (flx)                 Home Exercises Provided and Patient Education Provided     Education provided:   -Plan of care, HEP, cavitations and neck mobility    Assessment     She continues to perform all activities well in clinic, with appropriate degree of muscular and cardiovascular fatigue. We are attempting to gradually increase demand and subsequently her activity capacity, now including more UE work. She has waxing and waning performance, with some days of performance being better than others. She may hit a plateau given her history of COPD, however we find it imperative that she continue with supervised activity via PT to allow for a safe and gradual progression to reach her maximal safe functional capacity.    Patient prognosis is Good.      Anticipated barriers to physical therapy: Co-morbidities    Goals: Andree is working towards her goals.  Short Term Goals: 6 weeks   Patient will report at least 10 point increase on initial FOTO score to indicate clinically significant functional improvement     Long Term Goals: 12 weeks   Patient will report at least 15 point increase on initial FOTO score to indicate clinically significant functional improvement  Patient will complete 10 laps during 6 minute walk test to show improved activity tolerance    Plan     2-3x/week x 12 weeks    Jermaine Louie, PT

## 2024-07-02 ENCOUNTER — CLINICAL SUPPORT (OUTPATIENT)
Dept: REHABILITATION | Facility: HOSPITAL | Age: 64
End: 2024-07-02
Payer: MEDICAID

## 2024-07-02 DIAGNOSIS — M62.81 GENERALIZED MUSCLE WEAKNESS: Primary | ICD-10-CM

## 2024-07-02 DIAGNOSIS — Z74.09 IMPAIRED FUNCTIONAL MOBILITY, BALANCE, GAIT, AND ENDURANCE: ICD-10-CM

## 2024-07-02 DIAGNOSIS — R29.6 FREQUENT FALLS: ICD-10-CM

## 2024-07-02 PROCEDURE — 97110 THERAPEUTIC EXERCISES: CPT

## 2024-07-02 NOTE — PROGRESS NOTES
Physical Therapy Treatment Note     Name: Andree Mcclelland  Clinic Number: [id number]    Therapy Diagnosis:   Encounter Diagnoses   Name Primary?    Generalized muscle weakness Yes    Impaired functional mobility, balance, gait, and endurance     Frequent falls      Physician: Vish Resendez DO    Visit Date: 7/2/2024    Physician Orders: PT Eval and Treat  Medical Diagnosis from Referral: Generalized muscle weakness  Evaluation Date: 5/7/2024  Authorization Period Expiration: 9/7/24  Plan of Care Expiration: 8/7/2024  Visit # / Visits authorized: 13/ 24       Precautions: Frequent Falls     Time In: 1040  Time Out: 1133  Total Billable Time: 53 minutes    Subjective     Patient reports: No adverse reports. Overall she feels an improvement in her functional capacity. Was out from PT all of last week because she had a procedure.    CMS Impairment/Limitation/Restriction for FOTO Survey  Therapist reviewed FOTO scores for Andree Mcclelland on 5/7/2024. FOTO documents entered into EPIC - see Media section.  FOTO filled out by: Patient  Patient's Physical FS Primary Measure: 36  Risk Adjusted Statistical FOTO: 47 (predicted 51 by visit #10)  Limitation Score: 64%  Category: Mobility    Therapist reviewed FOTO scores for Andree Mcclelland on 5/28/2024. FOTO documents entered into EPIC - see Media section.  FOTO filled out by: Patient  Patient's Physical FS Primary Measure: 33  Risk Adjusted Statistical FOTO: 47 (predicted 51 by visit #10)  Limitation Score: 67%  Category: Mobility    Therapist reviewed FOTO scores for Andree Mcclelland on 6/20/2024. FOTO documents entered into EPIC - see Media section.  FOTO filled out by: Patient  Patient's Physical FS Primary Measure: 43  Risk Adjusted Statistical FOTO: 47 (predicted 51 by visit #10)  Limitation Score: 57%  Category: Mobility      Objective     Andree received the following treatment:     Time Activities   TherEx 53 min NuStep, sit<>stand no UEs, step ups (fwd, lat), treadmill walking,  HEP      Held today  BUE  Band shld ext (standing on therapad), weighted shoulder flx to 90 deg, weighted wall wipes (flx)                 Home Exercises Provided and Patient Education Provided     Education provided:   -Plan of care, HEP (printed, given, and reviewed 7/2/24; therabands yellow and red given 7/2/24)    Assessment     She continues to perform all activities well in clinic, with appropriate degree of muscular and cardiovascular fatigue. We are attempting to gradually increase demand and subsequently her activity capacity, now including more UE work. She has waxing and waning performance, with some days of performance being better than others. She may hit a plateau given her history of COPD, however we find it imperative that she continue with supervised activity via PT to allow for a safe and gradual progression to reach her maximal safe functional capacity.    Patient prognosis is Good.      Anticipated barriers to physical therapy: Co-morbidities    Goals: Andree is working towards her goals.  Short Term Goals: 6 weeks   Patient will report at least 10 point increase on initial FOTO score to indicate clinically significant functional improvement     Long Term Goals: 12 weeks   Patient will report at least 15 point increase on initial FOTO score to indicate clinically significant functional improvement  Patient will complete 10 laps during 6 minute walk test to show improved activity tolerance    Plan     2-3x/week x 12 weeks    Jermaine Louie, PT

## 2024-07-03 NOTE — PROGRESS NOTES
Physical Therapy Treatment Note     Name: Andree Mcclelland  Clinic Number: [id number]    Therapy Diagnosis:   Encounter Diagnoses   Name Primary?    Generalized muscle weakness Yes    Impaired functional mobility, balance, gait, and endurance     Frequent falls      Physician: Vish Resendez DO    Visit Date: 7/9/2024    Physician Orders: PT Eval and Treat  Medical Diagnosis from Referral: Generalized muscle weakness  Evaluation Date: 5/7/2024  Authorization Period Expiration: 9/7/24  Plan of Care Expiration: 8/7/2024  Visit # / Visits authorized: 14/ 24       Precautions: Frequent Falls     Time In: 1035  Time Out: 1130  Total Billable Time: 40 minutes    Subjective     Patient reports: No adverse reports. Overall she feels an improvement in her functional capacity. Was out from PT all of last week because she had a procedure.    CMS Impairment/Limitation/Restriction for FOTO Survey  Therapist reviewed FOTO scores for Andree Mcclelland on 5/7/2024. FOTO documents entered into EPIC - see Media section.  FOTO filled out by: Patient  Patient's Physical FS Primary Measure: 36  Risk Adjusted Statistical FOTO: 47 (predicted 51 by visit #10)  Limitation Score: 64%  Category: Mobility    Therapist reviewed FOTO scores for Andree Mcclelland on 5/28/2024. FOTO documents entered into EPIC - see Media section.  FOTO filled out by: Patient  Patient's Physical FS Primary Measure: 33  Risk Adjusted Statistical FOTO: 47 (predicted 51 by visit #10)  Limitation Score: 67%  Category: Mobility    Therapist reviewed FOTO scores for Andree Mcclelland on 6/20/2024. FOTO documents entered into EPIC - see Media section.  FOTO filled out by: Patient  Patient's Physical FS Primary Measure: 43  Risk Adjusted Statistical FOTO: 47 (predicted 51 by visit #10)  Limitation Score: 57%  Category: Mobility      Objective     Andree received the following treatment:     Time Activities   TherEx 40 min NuStep, sit<>stand no UEs, step ups (fwd, lat), treadmill  walking      Held today  BUE  Band shld ext (standing on therapad), weighted shoulder flx to 90 deg, weighted wall wipes (flx)                 Home Exercises Provided and Patient Education Provided     Education provided:   -Plan of care, HEP (printed, given, and reviewed 7/2/24; therabands yellow and red given 7/2/24)    Assessment     She continues to perform all activities well in clinic, with appropriate degree of muscular and cardiovascular fatigue. We are attempting to gradually increase demand and subsequently her activity capacity, now including more UE work. She has waxing and waning performance, with some days of performance being better than others. She may hit a plateau given her history of COPD, however we find it imperative that she continue with supervised activity via PT to allow for a safe and gradual progression to reach her maximal safe functional capacity.    Patient prognosis is Good.      Anticipated barriers to physical therapy: Co-morbidities    Goals: Andree is working towards her goals.  Short Term Goals: 6 weeks   Patient will report at least 10 point increase on initial FOTO score to indicate clinically significant functional improvement     Long Term Goals: 12 weeks   Patient will report at least 15 point increase on initial FOTO score to indicate clinically significant functional improvement  Patient will complete 10 laps during 6 minute walk test to show improved activity tolerance    Plan     2-3x/week x 12 weeks    Jermaine Louie, PT

## 2024-07-09 ENCOUNTER — CLINICAL SUPPORT (OUTPATIENT)
Dept: REHABILITATION | Facility: HOSPITAL | Age: 64
End: 2024-07-09
Payer: MEDICAID

## 2024-07-09 DIAGNOSIS — M62.81 GENERALIZED MUSCLE WEAKNESS: Primary | ICD-10-CM

## 2024-07-09 DIAGNOSIS — R29.6 FREQUENT FALLS: ICD-10-CM

## 2024-07-09 DIAGNOSIS — Z74.09 IMPAIRED FUNCTIONAL MOBILITY, BALANCE, GAIT, AND ENDURANCE: ICD-10-CM

## 2024-07-09 PROCEDURE — 97110 THERAPEUTIC EXERCISES: CPT

## 2024-07-11 ENCOUNTER — CLINICAL SUPPORT (OUTPATIENT)
Dept: REHABILITATION | Facility: HOSPITAL | Age: 64
End: 2024-07-11
Payer: MEDICAID

## 2024-07-11 DIAGNOSIS — M62.81 GENERALIZED MUSCLE WEAKNESS: Primary | ICD-10-CM

## 2024-07-11 PROCEDURE — 97110 THERAPEUTIC EXERCISES: CPT

## 2024-07-11 NOTE — PROGRESS NOTES
Physical Therapy Treatment Note     Name: Andree Mcclelland  Clinic Number: [id number]    Therapy Diagnosis:   Encounter Diagnosis   Name Primary?    Generalized muscle weakness Yes     Physician: Vish Resendez DO    Visit Date: 7/11/2024    Physician Orders: PT Eval and Treat  Medical Diagnosis from Referral: Generalized muscle weakness  Evaluation Date: 5/7/2024  Authorization Period Expiration: 9/7/24  Plan of Care Expiration: 8/7/2024  Visit # / Visits authorized: 15/ 24       Precautions: Frequent Falls     Time In: 1050  Time Out: 1144  Total Billable Time: 44 minutes    Subjective     Patient reports: No adverse reports. Says she continues to feel stronger each session. Reports she was stung by a wasp twice yesterday in her thighs, which has them sore today.     CMS Impairment/Limitation/Restriction for FOTO Survey  Therapist reviewed FOTO scores for Andree Mcclelland on 5/7/2024. FOTO documents entered into EPIC - see Media section.  FOTO filled out by: Patient  Patient's Physical FS Primary Measure: 36  Risk Adjusted Statistical FOTO: 47 (predicted 51 by visit #10)  Limitation Score: 64%  Category: Mobility    Therapist reviewed FOTO scores for Andree Mcclelland on 5/28/2024. FOTO documents entered into EPIC - see Media section.  FOTO filled out by: Patient  Patient's Physical FS Primary Measure: 33  Risk Adjusted Statistical FOTO: 47 (predicted 51 by visit #10)  Limitation Score: 67%  Category: Mobility    Therapist reviewed FOTO scores for Andree Mcclelland on 6/20/2024. FOTO documents entered into EPIC - see Media section.  FOTO filled out by: Patient  Patient's Physical FS Primary Measure: 43  Risk Adjusted Statistical FOTO: 47 (predicted 51 by visit #10)  Limitation Score: 57%  Category: Mobility      Objective     Andree received the following treatment:     Time Activities   TherEx 44 min NuStep, sit<>stand no UEs, step ups (fwd, lat), treadmill walking      Held today  BUE  Band shld ext (standing on therapad),  weighted shoulder flx to 90 deg, weighted wall wipes (flx)         Home Exercises Provided and Patient Education Provided     Education provided:   -Plan of care, HEP (printed, given, and reviewed 7/2/24; therabands yellow and red given 7/2/24)    Assessment     She continues to perform all activities well in clinic, with appropriate degree of muscular and cardiovascular fatigue. We will continue to gradually increase demand on days she can tolerate. She may hit a plateau given her history of COPD, however we find it imperative that she continue with supervised activity via PT to allow for a safe and gradual progression to reach her maximal safe functional capacity.    Patient prognosis is Good.      Anticipated barriers to physical therapy: Co-morbidities    Goals: Andree is working towards her goals.  Short Term Goals: 6 weeks   Patient will report at least 10 point increase on initial FOTO score to indicate clinically significant functional improvement     Long Term Goals: 12 weeks   Patient will report at least 15 point increase on initial FOTO score to indicate clinically significant functional improvement  Patient will complete 10 laps during 6 minute walk test to show improved activity tolerance    Plan     2-3x/week x 12 weeks    Jose J Bianchi, PT

## 2024-07-16 ENCOUNTER — CLINICAL SUPPORT (OUTPATIENT)
Dept: REHABILITATION | Facility: HOSPITAL | Age: 64
End: 2024-07-16
Payer: MEDICAID

## 2024-07-16 DIAGNOSIS — M62.81 GENERALIZED MUSCLE WEAKNESS: Primary | ICD-10-CM

## 2024-07-16 PROCEDURE — 97110 THERAPEUTIC EXERCISES: CPT

## 2024-07-18 ENCOUNTER — CLINICAL SUPPORT (OUTPATIENT)
Dept: REHABILITATION | Facility: HOSPITAL | Age: 64
End: 2024-07-18
Payer: MEDICAID

## 2024-07-18 DIAGNOSIS — M62.81 GENERALIZED MUSCLE WEAKNESS: Primary | ICD-10-CM

## 2024-07-18 PROCEDURE — 97110 THERAPEUTIC EXERCISES: CPT

## 2024-07-18 NOTE — PROGRESS NOTES
Physical Therapy Treatment Note     Name: Andree Mcclelland  Clinic Number: [id number]    Therapy Diagnosis:   Encounter Diagnosis   Name Primary?    Generalized muscle weakness Yes     Physician: Vish Resendez DO    Visit Date: 7/18/2024    Physician Orders: PT Eval and Treat  Medical Diagnosis from Referral: Generalized muscle weakness  Evaluation Date: 5/7/2024  Authorization Period Expiration: 9/7/24  Plan of Care Expiration: 8/7/2024  Visit # / Visits authorized: 17/ 24     Precautions: Frequent Falls     Time In: 1055  Time Out: 1150  Total Billable Time: 55 minutes    Subjective     Patient reports: she is ready to try progressing some of exercises today.       CMS Impairment/Limitation/Restriction for FOTO Survey  Therapist reviewed FOTO scores for Andree Mcclelland on 5/7/2024. FOTO documents entered into EPIC - see Media section.  FOTO filled out by: Patient  Patient's Physical FS Primary Measure: 36  Risk Adjusted Statistical FOTO: 47 (predicted 51 by visit #10)  Limitation Score: 64%  Category: Mobility    Therapist reviewed FOTO scores for Andree Mcclelland on 5/28/2024. FOTO documents entered into EP[employer] - see Media section.  FOTO filled out by: Patient  Patient's Physical FS Primary Measure: 33  Risk Adjusted Statistical FOTO: 47 (predicted 51 by visit #10)  Limitation Score: 67%  Category: Mobility    Therapist reviewed FOTO scores for Andree Mcclelland on 6/20/2024. FOTO documents entered into EPIC - see Media section.  FOTO filled out by: Patient  Patient's Physical FS Primary Measure: 43  Risk Adjusted Statistical FOTO: 47 (predicted 51 by visit #10)  Limitation Score: 57%  Category: Mobility    Objective     Andree received the following treatment:     Time Activities   TherEx 55 min NuStep, sit<>stand no UEs, step ups (fwd, lat), treadmill walking       Home Exercises Provided and Patient Education Provided     Education provided:   -Plan of care, HEP (printed, given, and reviewed 7/2/24; therabands yellow and  "red given 7/2/24)    Assessment     Increased resistance, speed and/or reps for all exercises today. Noted more challenging for patient and needed some increased rest breaks between exercises. Andree was pleased with her performance and tolerance to challenging but did report she was more "winded" today. Will continue to progress activity as tolerated.     Patient prognosis is Good.      Anticipated barriers to physical therapy: Co-morbidities    Goals: Andree is working towards her goals.  Short Term Goals: 6 weeks   Patient will report at least 10 point increase on initial FOTO score to indicate clinically significant functional improvement     Long Term Goals: 12 weeks   Patient will report at least 15 point increase on initial FOTO score to indicate clinically significant functional improvement  Patient will complete 10 laps during 6 minute walk test to show improved activity tolerance    Plan     2-3x/week x 12 weeks    Jose J Bianchi, PT        "

## 2024-07-23 ENCOUNTER — CLINICAL SUPPORT (OUTPATIENT)
Dept: REHABILITATION | Facility: HOSPITAL | Age: 64
End: 2024-07-23
Payer: MEDICAID

## 2024-07-23 DIAGNOSIS — R29.6 FREQUENT FALLS: ICD-10-CM

## 2024-07-23 DIAGNOSIS — M62.81 GENERALIZED MUSCLE WEAKNESS: Primary | ICD-10-CM

## 2024-07-23 PROCEDURE — 97110 THERAPEUTIC EXERCISES: CPT

## 2024-07-23 NOTE — PROGRESS NOTES
Physical Therapy Treatment Note     Name: Andree Mcclelland  Clinic Number: [id number]    Therapy Diagnosis:   Encounter Diagnoses   Name Primary?    Generalized muscle weakness Yes    Frequent falls      Physician: Vish Resendez DO    Visit Date: 7/23/2024    Physician Orders: PT Eval and Treat  Medical Diagnosis from Referral: Generalized muscle weakness  Evaluation Date: 5/7/2024  Authorization Period Expiration: 9/7/24  Plan of Care Expiration: 8/7/2024  Visit # / Visits authorized: 18/ 24     Precautions: Frequent Falls     Time In: 1036  Time Out: 1125  Total Billable Time: 51 minutes    Subjective     Patient reports: she forgot to take her Spiriva this morning and is feeling short of breath. Brought her inhaler and asked to use prior to therapy session.        CMS Impairment/Limitation/Restriction for FOTO Survey  Therapist reviewed FOTO scores for Andree Mcclellnad on 5/7/2024. FOTO documents entered into [employer] - see Media section.  FOTO filled out by: Patient  Patient's Physical FS Primary Measure: 36  Risk Adjusted Statistical FOTO: 47 (predicted 51 by visit #10)  Limitation Score: 64%  Category: Mobility    Therapist reviewed FOTO scores for Andree Mcclelland on 5/28/2024. FOTO documents entered into EP[employer] - see Media section.  FOTO filled out by: Patient  Patient's Physical FS Primary Measure: 33  Risk Adjusted Statistical FOTO: 47 (predicted 51 by visit #10)  Limitation Score: 67%  Category: Mobility    Therapist reviewed FOTO scores for Andree Mcclelland on 6/20/2024. FOTO documents entered into EP[employer] - see Media section.  FOTO filled out by: Patient  Patient's Physical FS Primary Measure: 43  Risk Adjusted Statistical FOTO: 47 (predicted 51 by visit #10)  Limitation Score: 57%  Category: Mobility    Therapist reviewed FOTO scores for Andree Mcclelland on 7/23/2024. FOTO documents entered into EP[employer] - see Media section.  FOTO filled out by: Patient  Patient's Physical FS Primary Measure: 40  Risk Adjusted Statistical  FOTO: 47 (predicted 51 by visit #10)  Limitation Score: 60%  Category: Mobility    Objective     Andree received the following treatment:     Time Activities   TherEx 51 min NuStep, sit<>stand no UEs, step ups (fwd, lat), treadmill walking  Foam standing/mini squats     Home Exercises Provided and Patient Education Provided     Education provided:   -Plan of care, HEP (printed, given, and reviewed 7/2/24; therabands yellow and red given 7/2/24)    Assessment     Noted decreased tolerance to exercises today with increased SOB and more frequent rest breaks. Utilized inhaler x2 during session and limited TM walking to 6 minutes at 1.2 mph (reduced from prior session). Andree had a slight decrease in reported FOTO functional scores today, potentially due to current presentation. FOTO does nto reflect improvements seen in clinic with endurance and tolerance to exercise we are seeing. Will continue to progress activity as tolerated.     Patient prognosis is Good.      Anticipated barriers to physical therapy: Co-morbidities    Goals: Andree is working towards her goals.  Short Term Goals: 6 weeks   Patient will report at least 10 point increase on initial FOTO score to indicate clinically significant functional improvement - NOT MET     Long Term Goals: 12 weeks   Patient will report at least 15 point increase on initial FOTO score to indicate clinically significant functional improvement- NOT MET  Patient will complete 10 laps during 6 minute walk test to show improved activity tolerance - NOT MET    Plan     2-3x/week x 12 weeks    Jose J Bianchi, PT

## 2024-07-25 ENCOUNTER — CLINICAL SUPPORT (OUTPATIENT)
Dept: REHABILITATION | Facility: HOSPITAL | Age: 64
End: 2024-07-25
Payer: MEDICAID

## 2024-07-25 DIAGNOSIS — M62.81 GENERALIZED MUSCLE WEAKNESS: Primary | ICD-10-CM

## 2024-07-25 DIAGNOSIS — F41.9 ANXIETY: ICD-10-CM

## 2024-07-25 PROCEDURE — 97110 THERAPEUTIC EXERCISES: CPT

## 2024-07-25 RX ORDER — HYDROXYZINE PAMOATE 25 MG/1
CAPSULE ORAL
Qty: 120 CAPSULE | Refills: 8 | Status: SHIPPED | OUTPATIENT
Start: 2024-07-25

## 2024-07-25 NOTE — PROGRESS NOTES
Physical Therapy Treatment Note     Name: Andree Mcclelland  Clinic Number: [id number]    Therapy Diagnosis:   Encounter Diagnosis   Name Primary?    Generalized muscle weakness Yes     Physician: Vish Resendez DO    Visit Date: 7/25/2024    Physician Orders: PT Eval and Treat  Medical Diagnosis from Referral: Generalized muscle weakness  Evaluation Date: 5/7/2024  Authorization Period Expiration: 9/7/24  Plan of Care Expiration: 8/7/2024  Visit # / Visits authorized: 19/ 24     Precautions: Frequent Falls     Time In: 1045  Time Out: 1146  Total Billable Time: 61 minutes    Subjective     Patient reports: she took medicine today and is breathing well.  Able to walk to and from mailbox now, whereas before she was only able to go half way before needing rest.     CMS Impairment/Limitation/Restriction for FOTO Survey  Therapist reviewed FOTO scores for Andree Mcclelland on 5/7/2024. FOTO documents entered into EPIC - see Media section.  FOTO filled out by: Patient  Patient's Physical FS Primary Measure: 36  Risk Adjusted Statistical FOTO: 47 (predicted 51 by visit #10)  Limitation Score: 64%  Category: Mobility    Therapist reviewed FOTO scores for Andree Mcclelland on 5/28/2024. FOTO documents entered into EPIC - see Media section.  FOTO filled out by: Patient  Patient's Physical FS Primary Measure: 33  Risk Adjusted Statistical FOTO: 47 (predicted 51 by visit #10)  Limitation Score: 67%  Category: Mobility    Therapist reviewed FOTO scores for Andree Mcclelland on 6/20/2024. FOTO documents entered into EPIC - see Media section.  FOTO filled out by: Patient  Patient's Physical FS Primary Measure: 43  Risk Adjusted Statistical FOTO: 47 (predicted 51 by visit #10)  Limitation Score: 57%  Category: Mobility    Therapist reviewed FOTO scores for Andree Mcclelland on 7/23/2024. FOTO documents entered into EPIC - see Media section.  FOTO filled out by: Patient  Patient's Physical FS Primary Measure: 40  Risk Adjusted Statistical FOTO: 47  (predicted 51 by visit #10)  Limitation Score: 60%  Category: Mobility    Objective     Andree received the following treatment:     Time Activities   TherEx 61 min NuStep, sit<>stand no UEs, step ups (fwd, lat), treadmill walking  Foam standing/mini squats     Home Exercises Provided and Patient Education Provided     Education provided:   -Plan of care, HEP (printed, given, and reviewed 7/2/24; therabands yellow and red given 7/2/24)    Assessment     Improved overall with breathing and no increased SOB ; however did need increased rest breaks for muscle fatigue in LE's. Switched activity sequence this session and performed TM early on, which may have increased fatigue. Will continue to progress activity as tolerated.     Patient prognosis is Good.      Anticipated barriers to physical therapy: Co-morbidities    Goals: Andree is working towards her goals.  Short Term Goals: 6 weeks   Patient will report at least 10 point increase on initial FOTO score to indicate clinically significant functional improvement - NOT MET     Long Term Goals: 12 weeks   Patient will report at least 15 point increase on initial FOTO score to indicate clinically significant functional improvement- NOT MET  Patient will complete 10 laps during 6 minute walk test to show improved activity tolerance - NOT MET    Plan     2-3x/week x 12 weeks    Jose J Bianchi, PT

## 2024-07-30 ENCOUNTER — CLINICAL SUPPORT (OUTPATIENT)
Dept: REHABILITATION | Facility: HOSPITAL | Age: 64
End: 2024-07-30
Payer: MEDICAID

## 2024-07-30 DIAGNOSIS — M62.81 GENERALIZED MUSCLE WEAKNESS: Primary | ICD-10-CM

## 2024-07-30 DIAGNOSIS — R29.6 FREQUENT FALLS: ICD-10-CM

## 2024-07-30 PROCEDURE — 97110 THERAPEUTIC EXERCISES: CPT

## 2024-07-30 NOTE — PROGRESS NOTES
Physical Therapy Treatment Note     Name: Andree Mcclelland  Clinic Number: [id number]    Therapy Diagnosis:   Encounter Diagnoses   Name Primary?    Generalized muscle weakness Yes    Frequent falls      Physician: Vish Resendez DO    Visit Date: 7/30/2024    Physician Orders: PT Eval and Treat  Medical Diagnosis from Referral: Generalized muscle weakness  Evaluation Date: 5/7/2024  Authorization Period Expiration: 9/7/24  Plan of Care Expiration: 8/7/2024  Visit # / Visits authorized: 20/ 24     Precautions: Frequent Falls     Time In: 1040  Time Out: 1139  Total Billable Time: 59 minutes    Subjective     Patient reports: is doing well today. Walked a lot and did squats over weekend. Reminds therapist that she will be having cataract surgery on 8/12/24.      CMS Impairment/Limitation/Restriction for FOTO Survey  Therapist reviewed FOTO scores for Andree Mcclelland on 5/7/2024. FOTO documents entered into EPIC - see Media section.  FOTO filled out by: Patient  Patient's Physical FS Primary Measure: 36  Risk Adjusted Statistical FOTO: 47 (predicted 51 by visit #10)  Limitation Score: 64%  Category: Mobility    Therapist reviewed FOTO scores for Andree Mcclelland on 5/28/2024. FOTO documents entered into EPIC - see Media section.  FOTO filled out by: Patient  Patient's Physical FS Primary Measure: 33  Risk Adjusted Statistical FOTO: 47 (predicted 51 by visit #10)  Limitation Score: 67%  Category: Mobility    Therapist reviewed FOTO scores for Andree Mcclelland on 6/20/2024. FOTO documents entered into EPIC - see Media section.  FOTO filled out by: Patient  Patient's Physical FS Primary Measure: 43  Risk Adjusted Statistical FOTO: 47 (predicted 51 by visit #10)  Limitation Score: 57%  Category: Mobility    Therapist reviewed FOTO scores for Andree Mcclelland on 7/23/2024. FOTO documents entered into EPIC - see Media section.  FOTO filled out by: Patient  Patient's Physical FS Primary Measure: 40  Risk Adjusted Statistical FOTO: 47  (predicted 51 by visit #10)  Limitation Score: 60%  Category: Mobility    Objective     Andree received the following treatment:     Time Activities   TherEx 59 min NuStep, sit<>stand holding UEs with kettle bell, step ups (fwd, lat), treadmill walking  Foam standing/mini squats     Home Exercises Provided and Patient Education Provided     Education provided:   -Plan of care, HEP (printed, given, and reviewed 7/2/24; therabands yellow and red given 7/2/24)    Assessment     Adjusted exercises to challenge patient more today. Adjusted speed of movement and added some balance, aerobic components to step ups. Added kettle bell weights to sit to stands. Tolerated adjusted exercises well, but with some increased muscle fatigue and SOB noted. Rest breaks given as needed. Will continue to progress activity as tolerated.     Patient prognosis is Good.      Anticipated barriers to physical therapy: Co-morbidities    Goals: Andree is working towards her goals.  Short Term Goals: 6 weeks   Patient will report at least 10 point increase on initial FOTO score to indicate clinically significant functional improvement - NOT MET     Long Term Goals: 12 weeks   Patient will report at least 15 point increase on initial FOTO score to indicate clinically significant functional improvement- NOT MET  Patient will complete 10 laps during 6 minute walk test to show improved activity tolerance - NOT MET    Plan     2/week x 12 weeks    Jose J Bianchi, PT

## 2024-08-01 ENCOUNTER — CLINICAL SUPPORT (OUTPATIENT)
Dept: REHABILITATION | Facility: HOSPITAL | Age: 64
End: 2024-08-01
Payer: MEDICAID

## 2024-08-01 DIAGNOSIS — Z74.09 IMPAIRED FUNCTIONAL MOBILITY, BALANCE, GAIT, AND ENDURANCE: ICD-10-CM

## 2024-08-01 DIAGNOSIS — M62.81 GENERALIZED MUSCLE WEAKNESS: Primary | ICD-10-CM

## 2024-08-01 PROCEDURE — 97110 THERAPEUTIC EXERCISES: CPT

## 2024-08-01 NOTE — PROGRESS NOTES
Physical Therapy Treatment Note     Name: Andree Mcclelland  Clinic Number: [id number]    Therapy Diagnosis:   Encounter Diagnoses   Name Primary?    Generalized muscle weakness Yes    Impaired functional mobility, balance, gait, and endurance      Physician: Vish Resendez DO    Visit Date: 8/1/2024    Physician Orders: PT Eval and Treat  Medical Diagnosis from Referral: Generalized muscle weakness  Evaluation Date: 5/7/2024  Authorization Period Expiration: 9/7/24  Plan of Care Expiration: 8/7/2024  Visit # / Visits authorized: 21/ 24     Precautions: Frequent Falls     Time In: 1042  Time Out: 1145  Total Billable Time: 63 minutes    Subjective     Patient reports: she had a little bit of back pain following last session but it resolved by the next morning as has been fine since then. States that she likes being challenged. In discussion, requesting to adjust frequency to have another visit following her scheduled cataract surgery on 8/12/24. Plan to d/c in coming weeks.       CMS Impairment/Limitation/Restriction for FOTO Survey  Therapist reviewed FOTO scores for Andree Mcclelland on 5/7/2024. FOTO documents entered into EPIC - see Media section.  FOTO filled out by: Patient  Patient's Physical FS Primary Measure: 36  Risk Adjusted Statistical FOTO: 47 (predicted 51 by visit #10)  Limitation Score: 64%  Category: Mobility    Therapist reviewed FOTO scores for Andree Mcclelland on 5/28/2024. FOTO documents entered into EPIC - see Media section.  FOTO filled out by: Patient  Patient's Physical FS Primary Measure: 33  Risk Adjusted Statistical FOTO: 47 (predicted 51 by visit #10)  Limitation Score: 67%  Category: Mobility    Therapist reviewed FOTO scores for Andree Mcclelland on 6/20/2024. FOTO documents entered into EPIC - see Media section.  FOTO filled out by: Patient  Patient's Physical FS Primary Measure: 43  Risk Adjusted Statistical FOTO: 47 (predicted 51 by visit #10)  Limitation Score: 57%  Category:  Mobility    Therapist reviewed FOTO scores for Andree Mcclelland on 7/23/2024. FOTO documents entered into [facility] - see Media section.  FOTO filled out by: Patient  Patient's Physical FS Primary Measure: 40  Risk Adjusted Statistical FOTO: 47 (predicted 51 by visit #10)  Limitation Score: 60%  Category: Mobility    Objective     Andree received the following treatment:     Time Activities   TherEx 63 min NuStep, sit<>stand holding 10# dumbell, step ups (fwd with 5#, lateral up/overs), treadmill walking  Foam standing/mini squats     Home Exercises Provided and Patient Education Provided     Education provided:   -Plan of care, HEP (printed, given, and reviewed 7/2/24; therabands yellow and red given 7/2/24)    Assessment     Continue to fine tune exercises for more functional movements and adding weights to challenge patient.  She tolerated 1.4 mph x 10 min on TM today. Limited typically by SOB during session but less rest breaks given today. Continues to tolerate exercise well and will begin to focus on transitioning to home program that she can sustain once discharged.      Patient prognosis is Good.      Anticipated barriers to physical therapy: Co-morbidities    Goals: Andree is working towards her goals.  Short Term Goals: 6 weeks   Patient will report at least 10 point increase on initial FOTO score to indicate clinically significant functional improvement - NOT MET     Long Term Goals: 12 weeks   Patient will report at least 15 point increase on initial FOTO score to indicate clinically significant functional improvement- NOT MET  Patient will complete 10 laps during 6 minute walk test to show improved activity tolerance - NOT MET    Plan     Will decrease frequency to 1x/week for the next 3 weeks.     Jose J Bianchi, PT

## 2024-08-08 ENCOUNTER — CLINICAL SUPPORT (OUTPATIENT)
Dept: REHABILITATION | Facility: HOSPITAL | Age: 64
End: 2024-08-08
Payer: MEDICAID

## 2024-08-08 DIAGNOSIS — M62.81 GENERALIZED MUSCLE WEAKNESS: Primary | ICD-10-CM

## 2024-08-08 PROCEDURE — 97110 THERAPEUTIC EXERCISES: CPT

## 2024-08-10 DIAGNOSIS — M80.00XA AGE-RELATED OSTEOPOROSIS WITH CURRENT PATHOLOGICAL FRACTURE, INITIAL ENCOUNTER: ICD-10-CM

## 2024-08-12 RX ORDER — ALENDRONATE SODIUM 70 MG/1
70 TABLET ORAL
Qty: 12 TABLET | Refills: 1 | Status: SHIPPED | OUTPATIENT
Start: 2024-08-12

## 2024-08-14 ENCOUNTER — TELEPHONE (OUTPATIENT)
Dept: RADIOLOGY | Facility: HOSPITAL | Age: 64
End: 2024-08-14
Payer: MEDICAID

## 2024-08-14 NOTE — TELEPHONE ENCOUNTER
----- Message from Faiza Fernandez RN sent at 5/3/2024  8:20 AM CDT -----  Regarding: RE: 4b  No further recommendations from Kevin at this time.   3/25/24-PET-Findings of centrilobular emphysema. No pulmonary nodule identified. There is no pleural effusion or pneumothorax. The airways are patent.   There is calcific atherosclerosis of the aorta and coronary arteries; the heart and great vessels are otherwise normal.   There is no appreciable adenopathy.  ----- Message -----  From: Faiza Fernandez RN  Sent: 4/15/2024  12:00 AM CDT  To: Faiza Fernandez RN  Subject: RE: 4b                                           According to Dr. Brown's note, patient scheduled for PET scan 3/15/24 at [facility]. Not yet uploaded to the system.   ----- Message -----  From: Faiza Fernandez RN  Sent: 3/11/2024  12:00 AM CDT  To: Faiza Fernandez RN  Subject: RE: 4b                                           Spoke with Mervat at Dr. Resendez's office. She will place referral to Dr. Brown.   ----- Message -----  From: Faiza Fernandez RN  Sent: 2/22/2024  12:00 AM CST  To: Faiza Fernandez RN  Subject: 4b                                               Msg sent to Dr. Resendez for follow up.  ----- Message -----  From: Faiza Fernandez RN  Sent: 2/21/2024   9:24 AM CST  To: Faiza Fernandez RN  Subject: 4b                                               4b

## 2024-08-15 ENCOUNTER — CLINICAL SUPPORT (OUTPATIENT)
Dept: REHABILITATION | Facility: HOSPITAL | Age: 64
End: 2024-08-15
Payer: MEDICAID

## 2024-08-15 DIAGNOSIS — M62.81 GENERALIZED MUSCLE WEAKNESS: Primary | ICD-10-CM

## 2024-08-15 PROCEDURE — 97110 THERAPEUTIC EXERCISES: CPT

## 2024-08-15 NOTE — PROGRESS NOTES
"  Physical Therapy Treatment Note     Name: Andree Mcclelland  Clinic Number: [id number]    Therapy Diagnosis:   Encounter Diagnosis   Name Primary?    Generalized muscle weakness Yes     Physician: Vish Resendez DO    Visit Date: 8/15/2024    Physician Orders: PT Eval and Treat  Medical Diagnosis from Referral: Generalized muscle weakness  Evaluation Date: 5/7/2024  Authorization Period Expiration: 9/7/24  Plan of Care Expiration: 8/7/2024  Visit # / Visits authorized: 23/ 24     Precautions: Frequent Falls     Time In: 1107  Time Out: 1210  Total Billable Time: 63 minutes    Subjective     Patient reports: she is "doing wonderful". States her eyes are doing great and she can see everything, except for reading, following her cataract surgery earlier in the week. Wears eye patch when outside as her eye is sensitive to the sun. Feels her balance has also improved given her "balanced" vision now that both eyes have had procedure. She has an appt with optometrist on Aug 26 for new glasses.         CMS Impairment/Limitation/Restriction for FOTO Survey  Therapist reviewed FOTO scores for Andree Mcclelland on 5/7/2024. FOTO documents entered into EPIC - see Media section.  FOTO filled out by: Patient  Patient's Physical FS Primary Measure: 36  Risk Adjusted Statistical FOTO: 47 (predicted 51 by visit #10)  Limitation Score: 64%  Category: Mobility    Therapist reviewed FOTO scores for Andree Mcclelland on 5/28/2024. FOTO documents entered into EPIC - see Media section.  FOTO filled out by: Patient  Patient's Physical FS Primary Measure: 33  Risk Adjusted Statistical FOTO: 47 (predicted 51 by visit #10)  Limitation Score: 67%  Category: Mobility    Therapist reviewed FOTO scores for Andree Mcclelland on 6/20/2024. FOTO documents entered into EPIC - see Media section.  FOTO filled out by: Patient  Patient's Physical FS Primary Measure: 43  Risk Adjusted Statistical FOTO: 47 (predicted 51 by visit #10)  Limitation Score: 57%  Category: " Mobility    Therapist reviewed FOTO scores for Andree Mcclelland on 7/23/2024. FOTO documents entered into [employer] - see Media section.  FOTO filled out by: Patient  Patient's Physical FS Primary Measure: 40  Risk Adjusted Statistical FOTO: 47 (predicted 51 by visit #10)  Limitation Score: 60%  Category: Mobility    Objective     Andree received the following treatment:     Time Activities   TherEx 63 min NuStep, sit<>stand holding 10# dumbell, step ups (fwd with 5#, lateral up/overs), treadmill walking, Balance exercises (tandem stance on foam eyes open/closed, head nods and turns), SLS       Home Exercises Provided and Patient Education Provided     Education provided:   -Plan of care, HEP. Encouraged patient to remain active and become a member of local gym to perform wellness program to continue activity.     Assessment     Tolerated activity well today and added some new balance activities now that eye surgery completed. Had some sway when challenged on foam, however improved overall. Better endurance with activity when she takes inhaler at beginning of the day. Patient is prepared and ready to transition to an independent wellness program next visit. Will continue to reinforce need to remain active in a structured manner to maintain endurance and strength.         Patient prognosis is Good.      Anticipated barriers to physical therapy: Co-morbidities    Goals: Andree is working towards her goals.  Short Term Goals: 6 weeks   Patient will report at least 10 point increase on initial FOTO score to indicate clinically significant functional improvement - NOT MET     Long Term Goals: 12 weeks   Patient will report at least 15 point increase on initial FOTO score to indicate clinically significant functional improvement- NOT MET  Patient will complete 10 laps during 6 minute walk test to show improved activity tolerance - NOT MET    Plan     Will plan to discharge next visit    Jose J Bianchi, PT

## 2024-08-22 ENCOUNTER — CLINICAL SUPPORT (OUTPATIENT)
Dept: REHABILITATION | Facility: HOSPITAL | Age: 64
End: 2024-08-22
Payer: MEDICAID

## 2024-08-22 DIAGNOSIS — M62.81 GENERALIZED MUSCLE WEAKNESS: Primary | ICD-10-CM

## 2024-08-22 PROCEDURE — 97110 THERAPEUTIC EXERCISES: CPT

## 2024-08-22 NOTE — PROGRESS NOTES
"  Physical Therapy Treatment Note/ DISCHARGE Note     Name: Andree Mcclelland  Clinic Number: [id number]    Therapy Diagnosis:   Encounter Diagnosis   Name Primary?    Generalized muscle weakness Yes     Physician: Vish Resendez DO    Visit Date: 8/22/2024    Physician Orders: PT Eval and Treat  Medical Diagnosis from Referral: Generalized muscle weakness  Evaluation Date: 5/7/2024  Authorization Period Expiration: 9/7/24  Plan of Care Expiration: 8/7/2024  Visit # / Visits authorized: 24/ 24     Precautions: Frequent Falls     Time In: 1058  Time Out: 1145  Total Billable Time: 47 minutes    Subjective     Patient reports: she is "doing great". States that she is really feeling good, "no more falls and doesn't feel wobbly at all when walking." Vision is much better following B cataract surgery and has follow up with eye doctor on 8/26/24.          CMS Impairment/Limitation/Restriction for FOTO Survey  Therapist reviewed FOTO scores for Andree Mcclelland on 5/7/2024. FOTO documents entered into EP[employer] - see Media section.  FOTO filled out by: Patient  Patient's Physical FS Primary Measure: 36  Risk Adjusted Statistical FOTO: 47 (predicted 51 by visit #10)  Limitation Score: 64%  Category: Mobility    Therapist reviewed FOTO scores for Andree Mcclelland on 5/28/2024. FOTO documents entered into EPIC - see Media section.  FOTO filled out by: Patient  Patient's Physical FS Primary Measure: 33  Risk Adjusted Statistical FOTO: 47 (predicted 51 by visit #10)  Limitation Score: 67%  Category: Mobility    Therapist reviewed FOTO scores for Andree Mcclelland on 6/20/2024. FOTO documents entered into EPIC - see Media section.  FOTO filled out by: Patient  Patient's Physical FS Primary Measure: 43  Risk Adjusted Statistical FOTO: 47 (predicted 51 by visit #10)  Limitation Score: 57%  Category: Mobility    Therapist reviewed FOTO scores for Andree Mcclelland on 7/23/2024. FOTO documents entered into EPIC - see Media section.  FOTO filled out by: " Patient  Patient's Physical FS Primary Measure: 40  Risk Adjusted Statistical FOTO: 47 (predicted 51 by visit #10)  Limitation Score: 60%  Category: Mobility    Therapist reviewed FOTO scores for Andree Mcclelland on 8/22/2024. FOTO documents entered into [employer] - see Media section.  FOTO filled out by: Patient  Patient's Physical FS Primary Measure: 40  Risk Adjusted Statistical FOTO: 48 (predicted 51 by visit #10)  Limitation Score: 52%  Category: Mobility    Objective     Andree received the following treatment:     Time Activities   TherEx 47 min NuStep, sit<>stand holding 10# dumbell, step ups (fwd with 5#, lateral up/overs), treadmill walking, Balance exercises (tandem stance on foam eyes open/closed, head nods and turns), SLS       Balance     Standing level/firm surface eyes open: Normal  Standing level/firm surface eyes closed: Normal  Romberg Stance: swaying, but able to hold  Tandem stance (eyes open): swaying and unable to hold  Tandem stance (eyes closed): swaying and unable to hold  Single leg stance left: swaying and unable to hold  Single leg stance right: swaying and unable to hold       At discharge:    Standing level/firm surface eyes open: Normal  Standing level/firm surface eyes closed: Normal  Romberg Stance: 30 seconds  Tandem stance (eyes open): R = 30 sec; L = 15 sec  Tandem stance (eyes closed): 6 seconds  Single leg stance LEFT = 5 seconds  Single leg stance right= 5 seconds   UE ROM/Strength     WFL all motions and strength bilaterally except active shoulder flexion right to 95 deg, active assisted shoulder flexion right to 150 deg        LE ROM/Strength  WFL all bilaterally      Full AROM of B shoulders with no limitations on R.   Other     6 minute walk test  -Only able to get to 4 minutes, 6.5 laps completed    6 minute walk test:     Completed 10.5 laps in 6 minutes, steady gait speed.      Home Exercises Provided and Patient Education Provided     Education provided:   -Plan of care, HEP.  Encouraged patient to remain active and become a member of local gym to perform wellness program to continue activity.     Assessment     Patient has completed 24 sessions of therapy and has demonstrated good overall improvements in function, mobility and balance. She has some delays during the course of treatment for eye procedures, but is now ready and prepared to transition to an independent exercise program. Pt has not had any recent falls since initiation of therapy and is now demonstrating full AROM in R shoulder. Encouraged and reinforced need to remain active in a structured manner to maintain endurance and strength. Pt reports she will stop by a local gym to get set-up on a wellness program to maintain activity.          Patient prognosis is Good.      Anticipated barriers to physical therapy: Co-morbidities    Goals: Andree is working towards her goals.  Short Term Goals: 6 weeks   Patient will report at least 10 point increase on initial FOTO score to indicate clinically significant functional improvement - MET     Long Term Goals: 12 weeks   Patient will report at least 15 point increase on initial FOTO score to indicate clinically significant functional improvement- MET  Patient will complete 10 laps during 6 minute walk test to show improved activity tolerance - MET    Plan     Will discharge from therapy at this time.     Jose J Bianchi, PT

## 2024-09-11 DIAGNOSIS — E55.9 VITAMIN D DEFICIENCY: ICD-10-CM

## 2024-09-12 RX ORDER — ERGOCALCIFEROL 1.25 MG/1
CAPSULE ORAL
Qty: 12 CAPSULE | Refills: 1 | Status: SHIPPED | OUTPATIENT
Start: 2024-09-12

## 2024-10-15 DIAGNOSIS — I10 PRIMARY HYPERTENSION: ICD-10-CM

## 2024-10-15 DIAGNOSIS — J43.1 PANLOBULAR EMPHYSEMA: ICD-10-CM

## 2024-10-15 RX ORDER — BUDESONIDE AND FORMOTEROL FUMARATE DIHYDRATE 160; 4.5 UG/1; UG/1
2 AEROSOL RESPIRATORY (INHALATION) EVERY 12 HOURS
Qty: 10.2 G | Refills: 6 | Status: SHIPPED | OUTPATIENT
Start: 2024-10-15

## 2024-10-15 RX ORDER — LISINOPRIL 40 MG/1
40 TABLET ORAL
Qty: 90 TABLET | Refills: 4 | Status: SHIPPED | OUTPATIENT
Start: 2024-10-15

## 2024-11-06 DIAGNOSIS — G62.9 NEUROPATHY: ICD-10-CM

## 2024-11-06 DIAGNOSIS — J43.1 PANLOBULAR EMPHYSEMA: ICD-10-CM

## 2024-11-06 RX ORDER — GABAPENTIN 300 MG/1
CAPSULE ORAL
Qty: 30 CAPSULE | Refills: 8 | Status: SHIPPED | OUTPATIENT
Start: 2024-11-06

## 2024-11-06 RX ORDER — CETIRIZINE HYDROCHLORIDE 10 MG/1
10 TABLET ORAL
Qty: 90 TABLET | Refills: 3 | Status: SHIPPED | OUTPATIENT
Start: 2024-11-06

## 2025-01-03 DIAGNOSIS — E78.2 MIXED HYPERLIPIDEMIA: Primary | ICD-10-CM

## 2025-01-03 DIAGNOSIS — Z00.00 WELLNESS EXAMINATION: ICD-10-CM

## 2025-01-03 DIAGNOSIS — Z83.42 FAMILY HISTORY OF HYPERCHOLESTEROLEMIA: ICD-10-CM

## 2025-01-03 DIAGNOSIS — I10 PRIMARY HYPERTENSION: ICD-10-CM

## 2025-01-03 DIAGNOSIS — E55.9 VITAMIN D DEFICIENCY: ICD-10-CM

## 2025-01-17 DIAGNOSIS — E55.9 VITAMIN D DEFICIENCY: ICD-10-CM

## 2025-01-17 DIAGNOSIS — M80.00XA AGE-RELATED OSTEOPOROSIS WITH CURRENT PATHOLOGICAL FRACTURE, INITIAL ENCOUNTER: ICD-10-CM

## 2025-01-17 RX ORDER — ALENDRONATE SODIUM 70 MG/1
70 TABLET ORAL
Qty: 12 TABLET | Refills: 2 | Status: SHIPPED | OUTPATIENT
Start: 2025-01-17

## 2025-01-23 RX ORDER — ERGOCALCIFEROL 1.25 MG/1
CAPSULE ORAL
Qty: 12 CAPSULE | Refills: 2 | Status: SHIPPED | OUTPATIENT
Start: 2025-01-23

## 2025-01-31 DIAGNOSIS — F32.5 MAJOR DEPRESSION IN REMISSION: ICD-10-CM

## 2025-01-31 DIAGNOSIS — E78.2 MIXED HYPERLIPIDEMIA: ICD-10-CM

## 2025-02-03 RX ORDER — LOVASTATIN 40 MG/1
40 TABLET ORAL
Qty: 90 TABLET | Refills: 1 | Status: SHIPPED | OUTPATIENT
Start: 2025-02-03

## 2025-02-03 RX ORDER — ESCITALOPRAM OXALATE 10 MG/1
10 TABLET ORAL
Qty: 90 TABLET | Refills: 1 | Status: SHIPPED | OUTPATIENT
Start: 2025-02-03

## 2025-03-07 DIAGNOSIS — Z13.1 SCREENING FOR DIABETES MELLITUS: ICD-10-CM

## 2025-03-07 DIAGNOSIS — Z00.00 WELLNESS EXAMINATION: Primary | ICD-10-CM

## 2025-03-08 ENCOUNTER — HOSPITAL ENCOUNTER (INPATIENT)
Facility: HOSPITAL | Age: 65
LOS: 4 days | Discharge: NURSING, REHAB OR LONG-TERM CARE | DRG: 536 | End: 2025-03-12
Attending: INTERNAL MEDICINE | Admitting: INTERNAL MEDICINE
Payer: MEDICARE

## 2025-03-08 ENCOUNTER — HOSPITAL ENCOUNTER (EMERGENCY)
Facility: HOSPITAL | Age: 65
Discharge: ANOTHER HOSPITAL | End: 2025-03-08
Attending: FAMILY MEDICINE
Payer: MEDICARE

## 2025-03-08 VITALS
OXYGEN SATURATION: 96 % | BODY MASS INDEX: 21.85 KG/M2 | TEMPERATURE: 97 F | RESPIRATION RATE: 16 BRPM | HEIGHT: 64 IN | SYSTOLIC BLOOD PRESSURE: 120 MMHG | DIASTOLIC BLOOD PRESSURE: 86 MMHG | WEIGHT: 128 LBS | HEART RATE: 90 BPM

## 2025-03-08 DIAGNOSIS — S72.001A CLOSED RIGHT HIP FRACTURE, INITIAL ENCOUNTER: Primary | ICD-10-CM

## 2025-03-08 DIAGNOSIS — E87.1 HYPONATREMIA: ICD-10-CM

## 2025-03-08 DIAGNOSIS — S72.009A HIP FRACTURE: Primary | ICD-10-CM

## 2025-03-08 DIAGNOSIS — R07.9 CHEST PAIN: ICD-10-CM

## 2025-03-08 DIAGNOSIS — T14.90XA TRAUMA: ICD-10-CM

## 2025-03-08 LAB
ALBUMIN SERPL-MCNC: 3.8 G/DL (ref 3.4–4.8)
ALBUMIN/GLOB SERPL: 1.1 RATIO (ref 1.1–2)
ALP SERPL-CCNC: 74 UNIT/L (ref 40–150)
ALT SERPL-CCNC: 14 UNIT/L (ref 0–55)
ANION GAP SERPL CALC-SCNC: 10 MEQ/L
APTT PPP: 27.1 SECONDS (ref 24.5–32.8)
AST SERPL-CCNC: 18 UNIT/L (ref 5–34)
BACTERIA #/AREA URNS AUTO: ABNORMAL /HPF
BASOPHILS # BLD AUTO: 0.03 X10(3)/MCL
BASOPHILS NFR BLD AUTO: 0.2 %
BILIRUB SERPL-MCNC: 0.6 MG/DL
BILIRUB UR QL STRIP.AUTO: NEGATIVE
BUN SERPL-MCNC: 4 MG/DL (ref 9.8–20.1)
CALCIUM SERPL-MCNC: 9 MG/DL (ref 8.4–10.2)
CHLORIDE SERPL-SCNC: 94 MMOL/L (ref 98–107)
CLARITY UR: CLEAR
CO2 SERPL-SCNC: 21 MMOL/L (ref 23–31)
COLOR UR AUTO: YELLOW
CREAT SERPL-MCNC: 0.73 MG/DL (ref 0.55–1.02)
CREAT/UREA NIT SERPL: 5
EOSINOPHIL # BLD AUTO: 0.05 X10(3)/MCL (ref 0–0.9)
EOSINOPHIL NFR BLD AUTO: 0.4 %
ERYTHROCYTE [DISTWIDTH] IN BLOOD BY AUTOMATED COUNT: 14.2 % (ref 11.5–17)
ETHANOL SERPL-MCNC: <10 MG/DL
GFR SERPLBLD CREATININE-BSD FMLA CKD-EPI: >60 ML/MIN/1.73/M2
GLOBULIN SER-MCNC: 3.4 GM/DL (ref 2.4–3.5)
GLUCOSE SERPL-MCNC: 114 MG/DL (ref 82–115)
GLUCOSE UR QL STRIP: NEGATIVE
HCT VFR BLD AUTO: 35.1 % (ref 37–47)
HGB BLD-MCNC: 12.5 G/DL (ref 12–16)
HGB UR QL STRIP: ABNORMAL
IMM GRANULOCYTES # BLD AUTO: 0.06 X10(3)/MCL (ref 0–0.04)
IMM GRANULOCYTES NFR BLD AUTO: 0.4 %
INR PPP: 1
KETONES UR QL STRIP: NEGATIVE
LEUKOCYTE ESTERASE UR QL STRIP: NEGATIVE
LYMPHOCYTES # BLD AUTO: 2.11 X10(3)/MCL (ref 0.6–4.6)
LYMPHOCYTES NFR BLD AUTO: 14.9 %
MCH RBC QN AUTO: 32.8 PG (ref 27–31)
MCHC RBC AUTO-ENTMCNC: 35.6 G/DL (ref 33–36)
MCV RBC AUTO: 92.1 FL (ref 80–94)
MONOCYTES # BLD AUTO: 1.22 X10(3)/MCL (ref 0.1–1.3)
MONOCYTES NFR BLD AUTO: 8.6 %
NEUTROPHILS # BLD AUTO: 10.71 X10(3)/MCL (ref 2.1–9.2)
NEUTROPHILS NFR BLD AUTO: 75.5 %
NITRITE UR QL STRIP: NEGATIVE
NRBC BLD AUTO-RTO: 0 %
PH UR STRIP: 6 [PH]
PLATELET # BLD AUTO: 187 X10(3)/MCL (ref 130–400)
PMV BLD AUTO: 9.8 FL (ref 7.4–10.4)
POTASSIUM SERPL-SCNC: 4.2 MMOL/L (ref 3.5–5.1)
PROT SERPL-MCNC: 7.2 GM/DL (ref 5.8–7.6)
PROT UR QL STRIP: NEGATIVE
PROTHROMBIN TIME: 9.8 SECONDS (ref 9.3–11.4)
RBC # BLD AUTO: 3.81 X10(6)/MCL (ref 4.2–5.4)
RBC #/AREA URNS AUTO: ABNORMAL /HPF
SODIUM SERPL-SCNC: 125 MMOL/L (ref 136–145)
SP GR UR STRIP.AUTO: <=1.005 (ref 1–1.03)
SQUAMOUS #/AREA URNS AUTO: ABNORMAL /HPF
UROBILINOGEN UR STRIP-ACNC: 0.2
WBC # BLD AUTO: 14.18 X10(3)/MCL (ref 4.5–11.5)
WBC #/AREA URNS AUTO: ABNORMAL /HPF

## 2025-03-08 PROCEDURE — 85730 THROMBOPLASTIN TIME PARTIAL: CPT | Performed by: FAMILY MEDICINE

## 2025-03-08 PROCEDURE — 93005 ELECTROCARDIOGRAM TRACING: CPT

## 2025-03-08 PROCEDURE — S4991 NICOTINE PATCH NONLEGEND: HCPCS | Performed by: INTERNAL MEDICINE

## 2025-03-08 PROCEDURE — 96374 THER/PROPH/DIAG INJ IV PUSH: CPT

## 2025-03-08 PROCEDURE — 96372 THER/PROPH/DIAG INJ SC/IM: CPT | Performed by: INTERNAL MEDICINE

## 2025-03-08 PROCEDURE — G0379 DIRECT REFER HOSPITAL OBSERV: HCPCS

## 2025-03-08 PROCEDURE — 96366 THER/PROPH/DIAG IV INF ADDON: CPT

## 2025-03-08 PROCEDURE — 25000003 PHARM REV CODE 250: Performed by: INTERNAL MEDICINE

## 2025-03-08 PROCEDURE — 63600175 PHARM REV CODE 636 W HCPCS: Performed by: INTERNAL MEDICINE

## 2025-03-08 PROCEDURE — 81003 URINALYSIS AUTO W/O SCOPE: CPT | Performed by: FAMILY MEDICINE

## 2025-03-08 PROCEDURE — 94761 N-INVAS EAR/PLS OXIMETRY MLT: CPT

## 2025-03-08 PROCEDURE — 63600175 PHARM REV CODE 636 W HCPCS: Mod: JZ,TB | Performed by: FAMILY MEDICINE

## 2025-03-08 PROCEDURE — 96365 THER/PROPH/DIAG IV INF INIT: CPT

## 2025-03-08 PROCEDURE — 25000242 PHARM REV CODE 250 ALT 637 W/ HCPCS: Performed by: INTERNAL MEDICINE

## 2025-03-08 PROCEDURE — 25000003 PHARM REV CODE 250: Performed by: FAMILY MEDICINE

## 2025-03-08 PROCEDURE — 82077 ASSAY SPEC XCP UR&BREATH IA: CPT | Performed by: FAMILY MEDICINE

## 2025-03-08 PROCEDURE — 99285 EMERGENCY DEPT VISIT HI MDM: CPT | Mod: 25

## 2025-03-08 PROCEDURE — G0378 HOSPITAL OBSERVATION PER HR: HCPCS

## 2025-03-08 PROCEDURE — 85025 COMPLETE CBC W/AUTO DIFF WBC: CPT | Performed by: FAMILY MEDICINE

## 2025-03-08 PROCEDURE — 80053 COMPREHEN METABOLIC PANEL: CPT | Performed by: FAMILY MEDICINE

## 2025-03-08 PROCEDURE — 51702 INSERT TEMP BLADDER CATH: CPT

## 2025-03-08 PROCEDURE — 85610 PROTHROMBIN TIME: CPT | Performed by: FAMILY MEDICINE

## 2025-03-08 RX ORDER — NALOXONE HCL 0.4 MG/ML
0.02 VIAL (ML) INJECTION
Status: DISCONTINUED | OUTPATIENT
Start: 2025-03-08 | End: 2025-03-12 | Stop reason: HOSPADM

## 2025-03-08 RX ORDER — OXYCODONE AND ACETAMINOPHEN 5; 325 MG/1; MG/1
2 TABLET ORAL EVERY 4 HOURS PRN
Refills: 0 | Status: DISCONTINUED | OUTPATIENT
Start: 2025-03-08 | End: 2025-03-12 | Stop reason: HOSPADM

## 2025-03-08 RX ORDER — BUDESONIDE AND FORMOTEROL FUMARATE DIHYDRATE 160; 4.5 UG/1; UG/1
2 AEROSOL RESPIRATORY (INHALATION) EVERY 12 HOURS
Status: DISCONTINUED | OUTPATIENT
Start: 2025-03-08 | End: 2025-03-12 | Stop reason: HOSPADM

## 2025-03-08 RX ORDER — ASPIRIN 81 MG/1
81 TABLET ORAL DAILY
Status: DISCONTINUED | OUTPATIENT
Start: 2025-03-09 | End: 2025-03-12 | Stop reason: HOSPADM

## 2025-03-08 RX ORDER — IBUPROFEN 200 MG
24 TABLET ORAL
Status: DISCONTINUED | OUTPATIENT
Start: 2025-03-08 | End: 2025-03-12 | Stop reason: HOSPADM

## 2025-03-08 RX ORDER — CHLORDIAZEPOXIDE HYDROCHLORIDE 5 MG/1
10 CAPSULE, GELATIN COATED ORAL EVERY 8 HOURS
Status: DISCONTINUED | OUTPATIENT
Start: 2025-03-08 | End: 2025-03-12 | Stop reason: HOSPADM

## 2025-03-08 RX ORDER — GLUCAGON 1 MG
1 KIT INJECTION
Status: DISCONTINUED | OUTPATIENT
Start: 2025-03-08 | End: 2025-03-12 | Stop reason: HOSPADM

## 2025-03-08 RX ORDER — LISINOPRIL 20 MG/1
40 TABLET ORAL DAILY
Status: DISCONTINUED | OUTPATIENT
Start: 2025-03-09 | End: 2025-03-12 | Stop reason: HOSPADM

## 2025-03-08 RX ORDER — SODIUM CHLORIDE 0.9 % (FLUSH) 0.9 %
10 SYRINGE (ML) INJECTION EVERY 12 HOURS PRN
Status: DISCONTINUED | OUTPATIENT
Start: 2025-03-08 | End: 2025-03-12 | Stop reason: HOSPADM

## 2025-03-08 RX ORDER — HYDROMORPHONE HYDROCHLORIDE 2 MG/ML
0.5 INJECTION, SOLUTION INTRAMUSCULAR; INTRAVENOUS; SUBCUTANEOUS
Refills: 0 | Status: COMPLETED | OUTPATIENT
Start: 2025-03-08 | End: 2025-03-08

## 2025-03-08 RX ORDER — SODIUM CHLORIDE 9 MG/ML
1000 INJECTION, SOLUTION INTRAVENOUS CONTINUOUS
Status: DISCONTINUED | OUTPATIENT
Start: 2025-03-08 | End: 2025-03-08 | Stop reason: HOSPADM

## 2025-03-08 RX ORDER — IBUPROFEN 200 MG
16 TABLET ORAL
Status: DISCONTINUED | OUTPATIENT
Start: 2025-03-08 | End: 2025-03-12 | Stop reason: HOSPADM

## 2025-03-08 RX ORDER — ESCITALOPRAM OXALATE 10 MG/1
10 TABLET ORAL DAILY
Status: DISCONTINUED | OUTPATIENT
Start: 2025-03-09 | End: 2025-03-12 | Stop reason: HOSPADM

## 2025-03-08 RX ORDER — CALCIUM CARBONATE 200(500)MG
500 TABLET,CHEWABLE ORAL 2 TIMES DAILY
Status: DISCONTINUED | OUTPATIENT
Start: 2025-03-08 | End: 2025-03-12 | Stop reason: HOSPADM

## 2025-03-08 RX ORDER — IBUPROFEN 200 MG
1 TABLET ORAL DAILY
Status: DISCONTINUED | OUTPATIENT
Start: 2025-03-08 | End: 2025-03-12 | Stop reason: HOSPADM

## 2025-03-08 RX ORDER — ATORVASTATIN CALCIUM 40 MG/1
40 TABLET, FILM COATED ORAL DAILY
Status: DISCONTINUED | OUTPATIENT
Start: 2025-03-09 | End: 2025-03-12 | Stop reason: HOSPADM

## 2025-03-08 RX ORDER — GABAPENTIN 300 MG/1
300 CAPSULE ORAL 3 TIMES DAILY
Status: DISCONTINUED | OUTPATIENT
Start: 2025-03-08 | End: 2025-03-12 | Stop reason: HOSPADM

## 2025-03-08 RX ORDER — ENOXAPARIN SODIUM 100 MG/ML
40 INJECTION SUBCUTANEOUS EVERY 24 HOURS
Status: DISCONTINUED | OUTPATIENT
Start: 2025-03-08 | End: 2025-03-09

## 2025-03-08 RX ADMIN — ENOXAPARIN SODIUM 40 MG: 40 INJECTION SUBCUTANEOUS at 04:03

## 2025-03-08 RX ADMIN — BUDESONIDE AND FORMOTEROL FUMARATE DIHYDRATE 2 PUFF: 160; 4.5 AEROSOL RESPIRATORY (INHALATION) at 09:03

## 2025-03-08 RX ADMIN — CALCIUM CARBONATE (ANTACID) CHEW TAB 500 MG 500 MG: 500 CHEW TAB at 09:03

## 2025-03-08 RX ADMIN — OXYCODONE HYDROCHLORIDE AND ACETAMINOPHEN 2 TABLET: 5; 325 TABLET ORAL at 09:03

## 2025-03-08 RX ADMIN — THIAMINE HYDROCHLORIDE: 100 INJECTION, SOLUTION INTRAMUSCULAR; INTRAVENOUS at 04:03

## 2025-03-08 RX ADMIN — NICOTINE 1 PATCH: 21 PATCH, EXTENDED RELEASE TRANSDERMAL at 04:03

## 2025-03-08 RX ADMIN — CHLORDIAZEPOXIDE HYDROCHLORIDE 10 MG: 5 CAPSULE ORAL at 09:03

## 2025-03-08 RX ADMIN — GABAPENTIN 300 MG: 300 CAPSULE ORAL at 09:03

## 2025-03-08 RX ADMIN — HYDROMORPHONE HYDROCHLORIDE 0.5 MG: 2 INJECTION, SOLUTION INTRAMUSCULAR; INTRAVENOUS; SUBCUTANEOUS at 10:03

## 2025-03-08 RX ADMIN — SODIUM CHLORIDE 1000 ML: 9 INJECTION, SOLUTION INTRAVENOUS at 11:03
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
## 2025-03-08 NOTE — PLAN OF CARE
Problem: Adult Inpatient Plan of Care  Goal: Plan of Care Review  Outcome: Progressing  Goal: Patient-Specific Goal (Individualized)  Outcome: Progressing  Goal: Absence of Hospital-Acquired Illness or Injury  Outcome: Progressing  Goal: Optimal Comfort and Wellbeing  Outcome: Progressing  Goal: Readiness for Transition of Care  Outcome: Progressing     Problem: Skin Injury Risk Increased  Goal: Skin Health and Integrity  Outcome: Progressing     Problem: Infection  Goal: Absence of Infection Signs and Symptoms  Outcome: Progressing     Problem: Fall Injury Risk  Goal: Absence of Fall and Fall-Related Injury  Outcome: Progressing     Problem: Comorbidity Management  Goal: Maintenance of COPD Symptom Control  Outcome: Progressing  Goal: Blood Pressure in Desired Range  Outcome: Progressing     Problem: Hip Fracture Medical Management  Goal: Optimal Coping with Change in Health Status  Outcome: Progressing  Goal: Absence of Bleeding  Outcome: Progressing  Goal: Effective Bowel Elimination  Outcome: Progressing  Goal: Baseline Cognitive Function Maintained  Outcome: Progressing  Goal: Absence of Embolism  Outcome: Progressing  Goal: Fracture Stability  Outcome: Progressing  Goal: Optimal Functional Performance  Outcome: Progressing  Goal: Pain Control and Function  Outcome: Progressing  Goal: Effective Urinary Elimination  Outcome: Progressing
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
## 2025-03-09 LAB
OHS QRS DURATION: 78 MS
OHS QTC CALCULATION: 450 MS

## 2025-03-09 PROCEDURE — 25000003 PHARM REV CODE 250: Performed by: INTERNAL MEDICINE

## 2025-03-09 PROCEDURE — 11000001 HC ACUTE MED/SURG PRIVATE ROOM

## 2025-03-09 PROCEDURE — 25000242 PHARM REV CODE 250 ALT 637 W/ HCPCS: Performed by: INTERNAL MEDICINE

## 2025-03-09 PROCEDURE — 94761 N-INVAS EAR/PLS OXIMETRY MLT: CPT

## 2025-03-09 PROCEDURE — S4991 NICOTINE PATCH NONLEGEND: HCPCS | Performed by: INTERNAL MEDICINE

## 2025-03-09 PROCEDURE — 63600175 PHARM REV CODE 636 W HCPCS: Performed by: INTERNAL MEDICINE

## 2025-03-09 PROCEDURE — 96366 THER/PROPH/DIAG IV INF ADDON: CPT

## 2025-03-09 RX ORDER — HYDROMORPHONE HYDROCHLORIDE 2 MG/ML
1 INJECTION, SOLUTION INTRAMUSCULAR; INTRAVENOUS; SUBCUTANEOUS EVERY 6 HOURS PRN
Refills: 0 | Status: DISCONTINUED | OUTPATIENT
Start: 2025-03-09 | End: 2025-03-10

## 2025-03-09 RX ORDER — HYDROXYZINE PAMOATE 25 MG/1
25 CAPSULE ORAL ONCE
Status: COMPLETED | OUTPATIENT
Start: 2025-03-09 | End: 2025-03-09

## 2025-03-09 RX ORDER — ENOXAPARIN SODIUM 100 MG/ML
30 INJECTION SUBCUTANEOUS DAILY
Status: DISCONTINUED | OUTPATIENT
Start: 2025-03-09 | End: 2025-03-12 | Stop reason: HOSPADM

## 2025-03-09 RX ORDER — MUPIROCIN 20 MG/G
OINTMENT TOPICAL 2 TIMES DAILY
Status: DISCONTINUED | OUTPATIENT
Start: 2025-03-09 | End: 2025-03-12 | Stop reason: HOSPADM

## 2025-03-09 RX ORDER — ENOXAPARIN SODIUM 100 MG/ML
30 INJECTION SUBCUTANEOUS EVERY 24 HOURS
Status: DISCONTINUED | OUTPATIENT
Start: 2025-03-09 | End: 2025-03-09

## 2025-03-09 RX ADMIN — BUDESONIDE AND FORMOTEROL FUMARATE DIHYDRATE 2 PUFF: 160; 4.5 AEROSOL RESPIRATORY (INHALATION) at 09:03

## 2025-03-09 RX ADMIN — GABAPENTIN 300 MG: 300 CAPSULE ORAL at 08:03

## 2025-03-09 RX ADMIN — CALCIUM CARBONATE (ANTACID) CHEW TAB 500 MG 500 MG: 500 CHEW TAB at 09:03

## 2025-03-09 RX ADMIN — HYDROXYZINE PAMOATE 25 MG: 25 CAPSULE ORAL at 10:03

## 2025-03-09 RX ADMIN — CHLORDIAZEPOXIDE HYDROCHLORIDE 10 MG: 5 CAPSULE ORAL at 05:03

## 2025-03-09 RX ADMIN — MUPIROCIN 1 G: 20 OINTMENT TOPICAL at 09:03

## 2025-03-09 RX ADMIN — NICOTINE 1 PATCH: 21 PATCH, EXTENDED RELEASE TRANSDERMAL at 08:03

## 2025-03-09 RX ADMIN — ATORVASTATIN CALCIUM 40 MG: 40 TABLET, FILM COATED ORAL at 08:03

## 2025-03-09 RX ADMIN — ENOXAPARIN SODIUM 30 MG: 30 INJECTION SUBCUTANEOUS at 06:03

## 2025-03-09 RX ADMIN — GABAPENTIN 300 MG: 300 CAPSULE ORAL at 09:03

## 2025-03-09 RX ADMIN — OXYCODONE HYDROCHLORIDE AND ACETAMINOPHEN 2 TABLET: 5; 325 TABLET ORAL at 08:03

## 2025-03-09 RX ADMIN — ASPIRIN 81 MG: 81 TABLET, COATED ORAL at 08:03

## 2025-03-09 RX ADMIN — TIOTROPIUM BROMIDE INHALATION SPRAY 2 PUFF: 3.12 SPRAY, METERED RESPIRATORY (INHALATION) at 08:03

## 2025-03-09 RX ADMIN — THIAMINE HYDROCHLORIDE: 100 INJECTION, SOLUTION INTRAMUSCULAR; INTRAVENOUS at 03:03

## 2025-03-09 RX ADMIN — CALCIUM CARBONATE (ANTACID) CHEW TAB 500 MG 500 MG: 500 CHEW TAB at 08:03

## 2025-03-09 RX ADMIN — LISINOPRIL 40 MG: 20 TABLET ORAL at 08:03

## 2025-03-09 RX ADMIN — ESCITALOPRAM OXALATE 10 MG: 10 TABLET ORAL at 08:03

## 2025-03-09 RX ADMIN — OXYCODONE HYDROCHLORIDE AND ACETAMINOPHEN 2 TABLET: 5; 325 TABLET ORAL at 06:03

## 2025-03-09 RX ADMIN — GABAPENTIN 300 MG: 300 CAPSULE ORAL at 03:03

## 2025-03-09 RX ADMIN — CHLORDIAZEPOXIDE HYDROCHLORIDE 10 MG: 5 CAPSULE ORAL at 09:03

## 2025-03-09 RX ADMIN — CHLORDIAZEPOXIDE HYDROCHLORIDE 10 MG: 5 CAPSULE ORAL at 03:03
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
## 2025-03-09 NOTE — PLAN OF CARE
Problem: Adult Inpatient Plan of Care  Goal: Plan of Care Review  Outcome: Progressing  Goal: Patient-Specific Goal (Individualized)  Outcome: Progressing  Goal: Absence of Hospital-Acquired Illness or Injury  Outcome: Progressing  Goal: Optimal Comfort and Wellbeing  Outcome: Progressing  Goal: Readiness for Transition of Care  Outcome: Progressing     Problem: Skin Injury Risk Increased  Goal: Skin Health and Integrity  Outcome: Progressing     Problem: Infection  Goal: Absence of Infection Signs and Symptoms  Outcome: Progressing     Problem: Fall Injury Risk  Goal: Absence of Fall and Fall-Related Injury  Outcome: Progressing     Problem: Fall Injury Risk  Goal: Absence of Fall and Fall-Related Injury  Outcome: Progressing     Problem: Comorbidity Management  Goal: Maintenance of COPD Symptom Control  Outcome: Progressing  Goal: Blood Pressure in Desired Range  Outcome: Progressing     Problem: Hip Fracture Medical Management  Goal: Optimal Coping with Change in Health Status  Outcome: Progressing  Goal: Absence of Bleeding  Outcome: Progressing  Goal: Effective Bowel Elimination  Outcome: Progressing  Goal: Baseline Cognitive Function Maintained  Outcome: Progressing  Goal: Absence of Embolism  Outcome: Progressing  Goal: Fracture Stability  Outcome: Progressing  Goal: Optimal Functional Performance  Outcome: Progressing  Goal: Pain Control and Function  Outcome: Progressing  Goal: Effective Urinary Elimination  Outcome: Progressing

## 2025-03-09 NOTE — ASSESSMENT & PLAN NOTE
Right greater trochanteric fracture, nonoperable.    Continue current medical management as guided by hospital medicine.  Consult PT, toe-touch weight-bearing.    Consult case management for discharge planning, rehab versus skilled nursing.  The patient will follow up with Dr. Figueroa in 2 weeks.    Thank you for the consultation.
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
## 2025-03-09 NOTE — HPI
This is a 64-year-old female who sustained a right hip injury following a fall at home.  Daughter and family member are present in the room upon examination.  The patient states that she was walking into her house and she felt unsteady stating that her equilibrium is off.  She fell onto her right side had pain to the lateral aspect of the hip.  She thought this would get better.  However yesterday which was the day following the fall, she continued to have increasing pain to the right hip.  Upon examination and imaging she was found to have a greater trochanteric fracture.  Fortunately this is not operable.  We will keep the patient toe-touch weight-bearing.  The patient does live alone.  We will consult case management for rehab versus skilled nursing placement.  At this time both patient and family members agreed this is the best for her.  She has past medical history of pulmonary emphysema, hypertension, liver disease and alcoholism.  The patient denies any other injuries during the fall.  She does have a bruise on her opposite left hip as well.

## 2025-03-10 PROCEDURE — 11000001 HC ACUTE MED/SURG PRIVATE ROOM

## 2025-03-10 PROCEDURE — 97530 THERAPEUTIC ACTIVITIES: CPT

## 2025-03-10 PROCEDURE — 97161 PT EVAL LOW COMPLEX 20 MIN: CPT

## 2025-03-10 PROCEDURE — 63600175 PHARM REV CODE 636 W HCPCS: Performed by: INTERNAL MEDICINE

## 2025-03-10 PROCEDURE — 94761 N-INVAS EAR/PLS OXIMETRY MLT: CPT

## 2025-03-10 PROCEDURE — 97110 THERAPEUTIC EXERCISES: CPT

## 2025-03-10 PROCEDURE — 25000003 PHARM REV CODE 250: Performed by: INTERNAL MEDICINE

## 2025-03-10 PROCEDURE — 97116 GAIT TRAINING THERAPY: CPT

## 2025-03-10 PROCEDURE — S4991 NICOTINE PATCH NONLEGEND: HCPCS | Performed by: INTERNAL MEDICINE

## 2025-03-10 RX ORDER — HYDROMORPHONE HYDROCHLORIDE 2 MG/ML
1 INJECTION, SOLUTION INTRAMUSCULAR; INTRAVENOUS; SUBCUTANEOUS EVERY 6 HOURS PRN
Status: DISCONTINUED | OUTPATIENT
Start: 2025-03-10 | End: 2025-03-12 | Stop reason: HOSPADM

## 2025-03-10 RX ORDER — TALC
6 POWDER (GRAM) TOPICAL NIGHTLY PRN
Status: DISCONTINUED | OUTPATIENT
Start: 2025-03-10 | End: 2025-03-12 | Stop reason: HOSPADM

## 2025-03-10 RX ORDER — THIAMINE HCL 100 MG
100 TABLET ORAL DAILY
Status: DISCONTINUED | OUTPATIENT
Start: 2025-03-10 | End: 2025-03-12 | Stop reason: HOSPADM

## 2025-03-10 RX ORDER — FOLIC ACID 1 MG/1
1 TABLET ORAL DAILY
Status: DISCONTINUED | OUTPATIENT
Start: 2025-03-10 | End: 2025-03-12 | Stop reason: HOSPADM

## 2025-03-10 RX ADMIN — Medication 100 MG: at 02:03

## 2025-03-10 RX ADMIN — LISINOPRIL 40 MG: 20 TABLET ORAL at 09:03

## 2025-03-10 RX ADMIN — MUPIROCIN 1 G: 20 OINTMENT TOPICAL at 09:03

## 2025-03-10 RX ADMIN — FOLIC ACID 1 MG: 1 TABLET ORAL at 02:03

## 2025-03-10 RX ADMIN — ENOXAPARIN SODIUM 30 MG: 30 INJECTION SUBCUTANEOUS at 09:03

## 2025-03-10 RX ADMIN — GABAPENTIN 300 MG: 300 CAPSULE ORAL at 02:03

## 2025-03-10 RX ADMIN — CALCIUM CARBONATE (ANTACID) CHEW TAB 500 MG 500 MG: 500 CHEW TAB at 09:03

## 2025-03-10 RX ADMIN — BUDESONIDE AND FORMOTEROL FUMARATE DIHYDRATE 2 PUFF: 160; 4.5 AEROSOL RESPIRATORY (INHALATION) at 09:03

## 2025-03-10 RX ADMIN — ATORVASTATIN CALCIUM 40 MG: 40 TABLET, FILM COATED ORAL at 09:03

## 2025-03-10 RX ADMIN — GABAPENTIN 300 MG: 300 CAPSULE ORAL at 09:03

## 2025-03-10 RX ADMIN — CHLORDIAZEPOXIDE HYDROCHLORIDE 10 MG: 5 CAPSULE ORAL at 09:03

## 2025-03-10 RX ADMIN — THERA TABS 1 TABLET: TAB at 02:03

## 2025-03-10 RX ADMIN — OXYCODONE HYDROCHLORIDE AND ACETAMINOPHEN 2 TABLET: 5; 325 TABLET ORAL at 10:03

## 2025-03-10 RX ADMIN — NICOTINE 1 PATCH: 21 PATCH, EXTENDED RELEASE TRANSDERMAL at 09:03

## 2025-03-10 RX ADMIN — ESCITALOPRAM OXALATE 10 MG: 10 TABLET ORAL at 09:03

## 2025-03-10 RX ADMIN — ASPIRIN 81 MG: 81 TABLET, COATED ORAL at 09:03

## 2025-03-10 RX ADMIN — Medication 6 MG: at 09:03

## 2025-03-10 RX ADMIN — OXYCODONE HYDROCHLORIDE AND ACETAMINOPHEN 2 TABLET: 5; 325 TABLET ORAL at 06:03

## 2025-03-10 RX ADMIN — CHLORDIAZEPOXIDE HYDROCHLORIDE 10 MG: 5 CAPSULE ORAL at 06:03

## 2025-03-10 RX ADMIN — CHLORDIAZEPOXIDE HYDROCHLORIDE 10 MG: 5 CAPSULE ORAL at 02:03

## 2025-03-10 NOTE — PT/OT/SLP PROGRESS
Physical Therapy Treatment    Patient Name:  Andree Mcclelland   MRN:  [id number]    Recommendations:     Discharge Recommendations: Moderate Intensity Therapy (SWING BED)  Discharge Equipment Recommendations: bedside commode, bath bench, hip kit, walker, rolling  Barriers to discharge: Decreased caregiver support    Assessment:     Andree Mcclelland is a 64 y.o. female admitted with a medical diagnosis of Hip fracture.  She presents with the following impairments/functional limitations: weakness, impaired endurance, impaired functional mobility, gait instability, impaired self care skills, impaired balance, decreased coordination, decreased lower extremity function, decreased safety awareness, pain, decreased ROM, impaired skin, orthopedic precautions, impaired joint extensibility .    Pt is fatigued in chair, stated she is too tired to walk in larry again today.  She asks to get in bed.  Pt req modA to stand and modAx2 to amb to bed with walker, tf onto bed minimal assist and needed help with both legs to get into bed.  Pt will retuire further rehab before returning home and will benefit from Swing bed skilled therapy.    Rehab Prognosis: Good; patient would benefit from acute skilled PT services to address these deficits and reach maximum level of function.    Recent Surgery: * No surgery found *      Plan:     During this hospitalization, patient to be seen 6 x/week to address the identified rehab impairments via gait training, therapeutic activities, therapeutic exercises, neuromuscular re-education and progress toward the following goals:    Plan of Care Expires:       Subjective     Chief Complaint: tired in chair  Patient/Family Comments/goals: back to bed  Pain/Comfort:         Objective:     Communicated with pt prior to session.  Patient found up in chair with chair alarm, fatigue in chair upon PT entry to room.     General Precautions: Standard, fall  Orthopedic Precautions: RLE toe touch weight bearing  Braces:     Respiratory Status: Room air     Functional Mobility:  Transfers:     Sit to Stand:  moderate assistance and of 2 persons with rolling walker  Gait: amb to bed modA RW  Balance: fair      AM-PAC 6 CLICK MOBILITY          Treatment & Education:  Tf to bed Jennifer and sit to sup maxA with BLES into bed.    Patient left HOB elevated with all lines intact, call button in reach, and bed alarm on..    GOALS:   Multidisciplinary Problems       Physical Therapy Goals          Problem: Physical Therapy    Goal Priority Disciplines Outcome Interventions   Physical Therapy Goal     PT, PT/OT Progressing    Description: 1.  Pt will train HEP hip protocol as well as safety and fall prevention   2.  Pt will amb 150ft RW Jennifer TTWB RLE  3.  Pt will improve tfs to Jennifer                         Time Tracking:     PT Received On: 03/10/25  PT Start Time: 1220     PT Stop Time: 1248  PT Total Time (min): 28 min     Billable Minutes: Gait Training 10 and Therapeutic Activity 18    Treatment Type: Treatment  PT/PTA: PT           03/10/2025
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
## 2025-03-10 NOTE — PLAN OF CARE
New referral sent over to [place] Swing Bed    3:09 [facility] stated that the doctor is reviewing the paperwork and they will submit for ins auth and follow up w/ us again

## 2025-03-10 NOTE — PLAN OF CARE
Problem: Adult Inpatient Plan of Care  Goal: Plan of Care Review  Outcome: Progressing  Goal: Patient-Specific Goal (Individualized)  Outcome: Progressing  Goal: Absence of Hospital-Acquired Illness or Injury  Outcome: Progressing  Goal: Optimal Comfort and Wellbeing  Outcome: Progressing  Goal: Readiness for Transition of Care  Outcome: Progressing     Problem: Skin Injury Risk Increased  Goal: Skin Health and Integrity  Outcome: Progressing     Problem: Infection  Goal: Absence of Infection Signs and Symptoms  Outcome: Progressing     Problem: Fall Injury Risk  Goal: Absence of Fall and Fall-Related Injury  Outcome: Progressing     Problem: Comorbidity Management  Goal: Maintenance of COPD Symptom Control  Outcome: Progressing  Goal: Blood Pressure in Desired Range  Outcome: Progressing     Problem: Hip Fracture Medical Management  Goal: Optimal Coping with Change in Health Status  Outcome: Progressing  Goal: Absence of Bleeding  Outcome: Progressing  Goal: Effective Bowel Elimination  Outcome: Progressing  Goal: Baseline Cognitive Function Maintained  Outcome: Progressing  Goal: Absence of Embolism  Outcome: Progressing  Goal: Fracture Stability  Outcome: Progressing  Goal: Optimal Functional Performance  Outcome: Progressing  Goal: Pain Control and Function  Outcome: Progressing  Goal: Effective Urinary Elimination  Outcome: Progressing      INSTRUCCIONES PARA EL CRYSTAL DE KULDIP DEL COMPORTAMIENTO  Si comienza a sentir que no puede mantenerse seguro usted ni mantener seguros a los demás:  SI SE TRATA DE APRIL EMERGENCIA, LLAME .  Diríjase al Departamento de Emergencias más cercano  Llame al Departamento de Admisión al [facility]: [phone] opción 1  Llame a la Línea de Prevención del Suicidio en español: [phone]  Llame a la Línea Directa de COPE 24/7: [phone]  Llame a [facility]: [phone] Horario de atención: De 7 p. m. a 11 p. m. No hay atención los samm.    Evite las nj u otros medios con los cuales hacerse daño.     Absténgase de consumir alcohol o drogas.  Continúe viendo a whit proveedores de pacientes ambulatorios para toda necesidad médica y de kuldip mental.      CONSULTAS AMBULATORIAS DE [place]:  [facility]: [phone]  Horario: De lunes a viernes, de 8:00 a. m. a 5 p. m.  Cuando llame:  Tenga el nombre y número de sanderson proveedor de atención primaria de [place].  Tenga lista sanderson tarjeta del seguro.  Si no es un problema para usted trasladarse para april consulta, infórmeselo al planificador. Esta persona ampliará la búsqueda de alguien lo atienda.   SI NO PUEDE ASISTIR A SANDERSON CONSULTA O NECESITA CANCELAR APRIL CONSULTA AMBULATORIA DE [place]:  [facility] al: [phone] al menos 24 horas antes de sanderson consulta programada.   Si no llama, se podría limitar la programación de consultas futuras.    RECURSOS COMUNITARIOS EN EL CONDADO DE [place]    Información de emergencia las 24 horas  Línea de Crisis de Kuldip Mental: [phone]  Línea de Crisis del [facility]: [phone]  Admisiones/Servicios de Crisis Psiquiátrica (PCS) del Condado de [place]: [phone]  [facility] de Tratamiento de Agresión Sexual: [phone]  [facility] (Albergue para víctimas de abuso doméstico): [phone]  [facility] para Jóvenes y Familias en [place]: [phone]  Emergencia en el  Cuidado de Ancianos después del horario de atención: [phone]  [facility] de Recursos para la Trata de Personas: [phone]    Información y referencia  Defensores de la Comunidad: [phone]  Línea de Información Comunitaria/IMPACT: 211 o [phone]  Departamento sobre Envejecimiento: [phone]  Jose de Trabajo contra la [place]: [phone]  [facility]: [phone]  Departamento de Kuldip y Servicios Humanos de [place]: [phone]  [place] Nacional sobre Enfermedades Mentales (MATTHEW): [phone]  Línea de Ayuda para Padres: [phone]  Administración del Seguro Social: [phone]     Abuso de drogas y alcohol  Alcohólicos Anónimos: [phone]  Grupos para Familiares de Alcohólicos Anónimos: [phone]  Cocainómanos Anónimos: [phone]  Primer Paso de Desintoxicación (residentes no asegurados del condado de [place]): [phone]  Línea de Ayuda de Adicción y Recuperación; sírvase llamar a Wiser Choice/Impact (residentes no asegurados del condado de [place] - todos los niveles de atención): [phone] o llame al 211  Narcóticos Anónimos: [phone] (español)    Recursos remotos de recuperación  https://[email or web address]/meetings/bwkmmz-jl-bvmiuovn  https://aachats.org/sr-tfobyvbx-smyoan  https://www.PetpacerecXierkangy.org  https://Real Intent.org  https://www.JumpHawk.org  https://www.[employer]  https://heroinanonymous.org      Empleo  División de Rehabilitación Vocacional: [phone]  [facility] (Transitorio): [phone]    Paul   IMPACT: 211 o [phone]    Transporte  MTM: [phone], puede usar sanderson seguro T19 para viajes a consultas médicas siempre que llame al menos 2 días hábiles antes para reservar el traslado.      Centro de Recursos ante Crisis (Crisis Resource Center, CRC)  El CRC es un lugar donde los adultos que tienen april crisis de kuldip mental pueden obtener ayuda de manera  voluntaria. Si tiene 18 años o más, es residente del condado de [place] al menos 6 meses, está teniendo síntomas de problemas de kuldip mental y no tiene pensamientos suicidas o de matar a otras personas, entonces puede beneficiarse de april estadía a corto plazo en el CRC.    Llame y consulte si hay josué disponibles:  [place]: [phone] [address] [place] [address]  [place]: [phone] [address], [place] [address]  [place]: [phone] [address] [address]    Recursos para Niños/Adolescentes  Equipo de Tratamiento Urgente Móvil: [phone]  Club de Niños y Niñas: [phone]  Oficina de Bienestar de Menores en [place]: [phone]  [facility] (para los que se escaparon de casa): [phone]  Centro para Jóvenes y Familias en [place]: [phone]      RECURSOS COMUNITARIOS EN CONDADOS CIRCUNDANTES  Servicios Humanos y de Kuldip   Cond[place] de [place]: [phone]   Cond[place] de [place]: [phone] o [phone] ([place])  Condado de [place]: [phone]  Condado de : [phone]  [place] de [place]: [phone]  Cond[place] de [place]: [phone] o [phone]  Cond[place] de [place]: [phone] (crisis), [phone] (no urgente) o [phone] (no urgente)  [place] de [place]: [phone] (crisis), [phone] (crisis) o [phone] (no urgente/horario de atención regular)        Información adicional:   You are enrolled in [facility] Hospital Program. It is Monday-Friday from 9:00am to 2:30pm. Starting 9/19/23. On your first day come 15-30 minutes early.
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
## 2025-03-11 PROCEDURE — 25000003 PHARM REV CODE 250: Performed by: INTERNAL MEDICINE

## 2025-03-11 PROCEDURE — S4991 NICOTINE PATCH NONLEGEND: HCPCS | Performed by: INTERNAL MEDICINE

## 2025-03-11 PROCEDURE — 97110 THERAPEUTIC EXERCISES: CPT

## 2025-03-11 PROCEDURE — 99900035 HC TECH TIME PER 15 MIN (STAT)

## 2025-03-11 PROCEDURE — 94761 N-INVAS EAR/PLS OXIMETRY MLT: CPT

## 2025-03-11 PROCEDURE — 27000221 HC OXYGEN, UP TO 24 HOURS

## 2025-03-11 PROCEDURE — 97116 GAIT TRAINING THERAPY: CPT

## 2025-03-11 PROCEDURE — 63600175 PHARM REV CODE 636 W HCPCS: Performed by: INTERNAL MEDICINE

## 2025-03-11 PROCEDURE — 11000001 HC ACUTE MED/SURG PRIVATE ROOM

## 2025-03-11 RX ADMIN — THERA TABS 1 TABLET: TAB at 09:03

## 2025-03-11 RX ADMIN — LISINOPRIL 40 MG: 20 TABLET ORAL at 09:03

## 2025-03-11 RX ADMIN — OXYCODONE HYDROCHLORIDE AND ACETAMINOPHEN 2 TABLET: 5; 325 TABLET ORAL at 10:03

## 2025-03-11 RX ADMIN — Medication 6 MG: at 08:03

## 2025-03-11 RX ADMIN — CHLORDIAZEPOXIDE HYDROCHLORIDE 10 MG: 5 CAPSULE ORAL at 01:03

## 2025-03-11 RX ADMIN — CALCIUM CARBONATE (ANTACID) CHEW TAB 500 MG 500 MG: 500 CHEW TAB at 09:03

## 2025-03-11 RX ADMIN — GABAPENTIN 300 MG: 300 CAPSULE ORAL at 08:03

## 2025-03-11 RX ADMIN — MUPIROCIN 1 G: 20 OINTMENT TOPICAL at 09:03

## 2025-03-11 RX ADMIN — CALCIUM CARBONATE (ANTACID) CHEW TAB 500 MG 500 MG: 500 CHEW TAB at 08:03

## 2025-03-11 RX ADMIN — ENOXAPARIN SODIUM 30 MG: 30 INJECTION SUBCUTANEOUS at 09:03

## 2025-03-11 RX ADMIN — ATORVASTATIN CALCIUM 40 MG: 40 TABLET, FILM COATED ORAL at 09:03

## 2025-03-11 RX ADMIN — NICOTINE 1 PATCH: 21 PATCH, EXTENDED RELEASE TRANSDERMAL at 09:03

## 2025-03-11 RX ADMIN — ESCITALOPRAM OXALATE 10 MG: 10 TABLET ORAL at 09:03

## 2025-03-11 RX ADMIN — FOLIC ACID 1 MG: 1 TABLET ORAL at 09:03

## 2025-03-11 RX ADMIN — CHLORDIAZEPOXIDE HYDROCHLORIDE 10 MG: 5 CAPSULE ORAL at 08:03

## 2025-03-11 RX ADMIN — OXYCODONE HYDROCHLORIDE AND ACETAMINOPHEN 2 TABLET: 5; 325 TABLET ORAL at 03:03

## 2025-03-11 RX ADMIN — Medication 100 MG: at 09:03

## 2025-03-11 RX ADMIN — OXYCODONE HYDROCHLORIDE AND ACETAMINOPHEN 2 TABLET: 5; 325 TABLET ORAL at 08:03

## 2025-03-11 RX ADMIN — OXYCODONE HYDROCHLORIDE AND ACETAMINOPHEN 2 TABLET: 5; 325 TABLET ORAL at 06:03

## 2025-03-11 RX ADMIN — BUDESONIDE AND FORMOTEROL FUMARATE DIHYDRATE 2 PUFF: 160; 4.5 AEROSOL RESPIRATORY (INHALATION) at 09:03

## 2025-03-11 RX ADMIN — CHLORDIAZEPOXIDE HYDROCHLORIDE 10 MG: 5 CAPSULE ORAL at 06:03

## 2025-03-11 RX ADMIN — MUPIROCIN 1 G: 20 OINTMENT TOPICAL at 08:03

## 2025-03-11 RX ADMIN — ASPIRIN 81 MG: 81 TABLET, COATED ORAL at 09:03

## 2025-03-11 RX ADMIN — GABAPENTIN 300 MG: 300 CAPSULE ORAL at 01:03

## 2025-03-11 RX ADMIN — GABAPENTIN 300 MG: 300 CAPSULE ORAL at 09:03

## 2025-03-11 NOTE — PT/OT/SLP PROGRESS
Physical Therapy Treatment    Patient Name:  Andree Mcclelland   MRN:  [id number]    Recommendations:     Discharge Recommendations: Moderate Intensity Therapy (SWING BED)  Recommended mode of transport:  WC/car  Discharge Equipment Recommendations: bedside commode, bath bench, hip kit, walker, rolling  Barriers to discharge: Decreased caregiver support    Assessment:     Andree Mcclelland is a 64 y.o. female admitted with a medical diagnosis of Hip fracture.  She presents with the following impairments/functional limitations: weakness, impaired endurance, impaired functional mobility, gait instability, impaired self care skills, impaired balance, decreased coordination, decreased lower extremity function, decreased safety awareness, pain, decreased ROM, impaired skin, orthopedic precautions, impaired joint extensibility .    Pt amb in larry again this afternoon after sitting up in chair since this morning and required modA to tf to BSC and maxA toileting and dressing. Pt able to participate in HEP training with no AE.  Pt will require further rehab before returning home and will benefit from Swing bed skilled therapy.    Rehab Prognosis: Good; patient would benefit from acute skilled PT services to address these deficits and reach maximum level of function.    Recent Surgery: * No surgery found *      Plan:     During this hospitalization, patient to be seen 6 x/week to address the identified rehab impairments via gait training, therapeutic activities, therapeutic exercises, neuromuscular re-education and progress toward the following goals:    Plan of Care Expires:       Subjective     Chief Complaint: tired in chair  Patient/Family Comments/goals: back to bed  Pain/Comfort:         Objective:     Communicated with pt prior to session.  Patient found up in chair with chair alarm, ready to walk and get on BSC to try to have a BM upon PT entry to room.     General Precautions: Standard, fall  Orthopedic Precautions: RLE toe  touch weight bearing  Braces:    Respiratory Status: Room air     Functional Mobility:  Transfers:     Sit to Stand:  contact guard assistance, minimum assistance, and of 2 persons with rolling walker  Gait: amb to hallway and back to chair for distances of 82ftx2 modA RW  Balance: fair  Toilet TF:  tf on and off BSC modA  Toileting:  ModA (voided only, unable to have BM, pt called nsg for laxative)  Dressing LB:  totalA to don socks  TF to bed modA      AM-PAC 6 CLICK MOBILITY          Treatment & Education:  pt trained HEP hip protocol with PT.  Patient left HOB elevated with all lines intact, call button in reach, and bed alarm on..    GOALS:   Multidisciplinary Problems       Physical Therapy Goals          Problem: Physical Therapy    Goal Priority Disciplines Outcome Interventions   Physical Therapy Goal     PT, PT/OT Progressing    Description: 1.  Pt will train HEP hip protocol as well as safety and fall prevention   2.  Pt will amb 150ft RW Jennifer TTWB RLE  3.  Pt will improve tfs to Jennifer                         Time Tracking:     PT Received On: 03/11/25  PT Start Time: 1314     PT Stop Time: 1334  PT Total Time (min): 20 min     Billable Minutes: Gait Training 10, Therapeutic Activity 5, and Therapeutic Exercise 5    Treatment Type: Treatment  PT/PTA: PT           03/11/2025

## 2025-03-11 NOTE — PT/OT/SLP PROGRESS
Physical Therapy Treatment    Patient Name:  Andree Mcclelland   MRN:  [id number]    Recommendations:     Discharge Recommendations: Moderate Intensity Therapy (SWING BED)  Discharge Equipment Recommendations: bedside commode, bath bench, hip kit, walker, rolling  Barriers to discharge: Decreased caregiver support    Assessment:     Andree Mcclelland is a 64 y.o. female admitted with a medical diagnosis of Hip fracture.  She presents with the following impairments/functional limitations: weakness, impaired endurance, impaired functional mobility, gait instability, impaired self care skills, impaired balance, decreased coordination, decreased lower extremity function, decreased safety awareness, pain, decreased ROM, impaired skin, orthopedic precautions, impaired joint extensibility .    Pt amb in larry today and in room with steady assist, required modA to tf to BS and maxA toileting and dressing. Pt will retuire further rehab before returning home and will benefit from Swing bed skilled therapy.    Rehab Prognosis: Good; patient would benefit from acute skilled PT services to address these deficits and reach maximum level of function.    Recent Surgery: * No surgery found *      Plan:     During this hospitalization, patient to be seen 6 x/week to address the identified rehab impairments via gait training, therapeutic activities, therapeutic exercises, neuromuscular re-education and progress toward the following goals:    Plan of Care Expires:       Subjective     Chief Complaint: tired in chair  Patient/Family Comments/goals: back to bed  Pain/Comfort:         Objective:     Communicated with pt prior to session.  Patient found HOB elevated with bed alarm, ready to walk and get up in chair upon PT entry to room.     General Precautions: Standard, fall  Orthopedic Precautions: RLE toe touch weight bearing  Braces:    Respiratory Status: Room air     Functional Mobility:  Bed Mobility:     Supine to Sit: minimum  assistance  Transfers:     Sit to Stand:  contact guard assistance, minimum assistance, and of 2 persons with rolling walker  Gait: amb to hallway and back to chair for distances of 80ftx2 modA RW  Balance: fair  Toilet TF:  tf on and off BSC modA  Toileting:  ModA  Dressing LB:  totalA to don socks  Dressing UB:  Jennifer to don clean gown      AM-PAC 6 CLICK MOBILITY          Treatment & Education:  Tf to chair modA and trained HEP hip protocol with PT.  Patient left up in chair with all lines intact, call button in reach, and chair alarm on..    GOALS:   Multidisciplinary Problems       Physical Therapy Goals          Problem: Physical Therapy    Goal Priority Disciplines Outcome Interventions   Physical Therapy Goal     PT, PT/OT Progressing    Description: 1.  Pt will train HEP hip protocol as well as safety and fall prevention   2.  Pt will amb 150ft RW Jennifer TTWB RLE  3.  Pt will improve tfs to Jennifer                         Time Tracking:     PT Received On: 03/11/25  PT Start Time: 0900     PT Stop Time: 0923  PT Total Time (min): 23 min     Billable Minutes: Gait Training 10, Therapeutic Activity 5, and Therapeutic Exercise 8    Treatment Type: Treatment  PT/PTA: PT           03/11/2025
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
## 2025-03-12 ENCOUNTER — HOSPITAL ENCOUNTER (INPATIENT)
Facility: HOSPITAL | Age: 65
LOS: 5 days | Discharge: HOME HEALTH | DRG: 560 | End: 2025-03-17
Attending: FAMILY MEDICINE | Admitting: FAMILY MEDICINE
Payer: MEDICARE

## 2025-03-12 VITALS
OXYGEN SATURATION: 98 % | DIASTOLIC BLOOD PRESSURE: 73 MMHG | RESPIRATION RATE: 20 BRPM | BODY MASS INDEX: 18.21 KG/M2 | HEART RATE: 87 BPM | HEIGHT: 64 IN | WEIGHT: 106.69 LBS | TEMPERATURE: 98 F | SYSTOLIC BLOOD PRESSURE: 122 MMHG

## 2025-03-12 DIAGNOSIS — S72.111A: ICD-10-CM

## 2025-03-12 DIAGNOSIS — R91.1 LUNG NODULE SEEN ON IMAGING STUDY: Primary | ICD-10-CM

## 2025-03-12 DIAGNOSIS — S72.009A HIP FRACTURE: ICD-10-CM

## 2025-03-12 PROBLEM — E87.1 HYPONATREMIA: Status: ACTIVE | Noted: 2025-03-12

## 2025-03-12 LAB
ALBUMIN SERPL-MCNC: 3.1 G/DL (ref 3.4–4.8)
ALBUMIN/GLOB SERPL: 0.9 RATIO (ref 1.1–2)
ALP SERPL-CCNC: 50 UNIT/L (ref 40–150)
ALT SERPL-CCNC: 11 UNIT/L (ref 0–55)
ANION GAP SERPL CALC-SCNC: 8 MEQ/L
AST SERPL-CCNC: 14 UNIT/L (ref 5–34)
BILIRUB SERPL-MCNC: 0.3 MG/DL
BUN SERPL-MCNC: 9 MG/DL (ref 9.8–20.1)
CALCIUM SERPL-MCNC: 9.4 MG/DL (ref 8.4–10.2)
CHLORIDE SERPL-SCNC: 100 MMOL/L (ref 98–107)
CO2 SERPL-SCNC: 27 MMOL/L (ref 23–31)
CREAT SERPL-MCNC: 0.86 MG/DL (ref 0.55–1.02)
CREAT/UREA NIT SERPL: 10
GFR SERPLBLD CREATININE-BSD FMLA CKD-EPI: >60 ML/MIN/1.73/M2
GLOBULIN SER-MCNC: 3.3 GM/DL (ref 2.4–3.5)
GLUCOSE SERPL-MCNC: 122 MG/DL (ref 82–115)
POTASSIUM SERPL-SCNC: 4.3 MMOL/L (ref 3.5–5.1)
PROT SERPL-MCNC: 6.4 GM/DL (ref 5.8–7.6)
SODIUM SERPL-SCNC: 135 MMOL/L (ref 136–145)

## 2025-03-12 PROCEDURE — 94761 N-INVAS EAR/PLS OXIMETRY MLT: CPT

## 2025-03-12 PROCEDURE — 80053 COMPREHEN METABOLIC PANEL: CPT | Performed by: EMERGENCY MEDICINE

## 2025-03-12 PROCEDURE — 97116 GAIT TRAINING THERAPY: CPT

## 2025-03-12 PROCEDURE — 25000003 PHARM REV CODE 250: Performed by: INTERNAL MEDICINE

## 2025-03-12 PROCEDURE — 36415 COLL VENOUS BLD VENIPUNCTURE: CPT | Performed by: EMERGENCY MEDICINE

## 2025-03-12 PROCEDURE — 97530 THERAPEUTIC ACTIVITIES: CPT

## 2025-03-12 PROCEDURE — 99900035 HC TECH TIME PER 15 MIN (STAT)

## 2025-03-12 PROCEDURE — 25000003 PHARM REV CODE 250: Performed by: FAMILY MEDICINE

## 2025-03-12 PROCEDURE — 97110 THERAPEUTIC EXERCISES: CPT

## 2025-03-12 PROCEDURE — 63600175 PHARM REV CODE 636 W HCPCS: Performed by: INTERNAL MEDICINE

## 2025-03-12 PROCEDURE — 97162 PT EVAL MOD COMPLEX 30 MIN: CPT

## 2025-03-12 PROCEDURE — 99305 1ST NF CARE MODERATE MDM 35: CPT | Mod: ,,, | Performed by: FAMILY MEDICINE

## 2025-03-12 PROCEDURE — S4991 NICOTINE PATCH NONLEGEND: HCPCS | Performed by: INTERNAL MEDICINE

## 2025-03-12 PROCEDURE — 11000004 HC SNF PRIVATE

## 2025-03-12 RX ORDER — POLYETHYLENE GLYCOL 3350 17 G/17G
17 POWDER, FOR SOLUTION ORAL DAILY
Status: DISCONTINUED | OUTPATIENT
Start: 2025-03-13 | End: 2025-03-12

## 2025-03-12 RX ORDER — OXYCODONE AND ACETAMINOPHEN 5; 325 MG/1; MG/1
2 TABLET ORAL EVERY 4 HOURS PRN
Qty: 20 TABLET | Refills: 0 | Status: ON HOLD | OUTPATIENT
Start: 2025-03-12 | End: 2025-03-17

## 2025-03-12 RX ORDER — FLUTICASONE FUROATE AND VILANTEROL 200; 25 UG/1; UG/1
1 POWDER RESPIRATORY (INHALATION) DAILY
Status: DISCONTINUED | OUTPATIENT
Start: 2025-03-13 | End: 2025-03-13

## 2025-03-12 RX ORDER — MUPIROCIN 20 MG/G
OINTMENT TOPICAL 2 TIMES DAILY
Status: COMPLETED | OUTPATIENT
Start: 2025-03-12 | End: 2025-03-17

## 2025-03-12 RX ORDER — ALBUTEROL SULFATE 90 UG/1
2 INHALANT RESPIRATORY (INHALATION) EVERY 4 HOURS PRN
Status: DISCONTINUED | OUTPATIENT
Start: 2025-03-12 | End: 2025-03-17 | Stop reason: HOSPADM

## 2025-03-12 RX ORDER — BUDESONIDE AND FORMOTEROL FUMARATE DIHYDRATE 160; 4.5 UG/1; UG/1
2 AEROSOL RESPIRATORY (INHALATION) EVERY 12 HOURS
Status: DISCONTINUED | OUTPATIENT
Start: 2025-03-12 | End: 2025-03-12

## 2025-03-12 RX ORDER — ASPIRIN 81 MG/1
81 TABLET ORAL DAILY
Status: DISCONTINUED | OUTPATIENT
Start: 2025-03-13 | End: 2025-03-17 | Stop reason: HOSPADM

## 2025-03-12 RX ORDER — IBUPROFEN 200 MG
1 TABLET ORAL DAILY
Status: DISCONTINUED | OUTPATIENT
Start: 2025-03-13 | End: 2025-03-17 | Stop reason: HOSPADM

## 2025-03-12 RX ORDER — OXYCODONE AND ACETAMINOPHEN 5; 325 MG/1; MG/1
2 TABLET ORAL EVERY 4 HOURS PRN
Refills: 0 | Status: DISCONTINUED | OUTPATIENT
Start: 2025-03-12 | End: 2025-03-17 | Stop reason: HOSPADM

## 2025-03-12 RX ORDER — GABAPENTIN 300 MG/1
300 CAPSULE ORAL 3 TIMES DAILY
Status: DISCONTINUED | OUTPATIENT
Start: 2025-03-12 | End: 2025-03-17 | Stop reason: HOSPADM

## 2025-03-12 RX ORDER — OXYCODONE AND ACETAMINOPHEN 5; 325 MG/1; MG/1
1 TABLET ORAL EVERY 4 HOURS PRN
Refills: 0 | Status: DISCONTINUED | OUTPATIENT
Start: 2025-03-12 | End: 2025-03-17 | Stop reason: HOSPADM

## 2025-03-12 RX ORDER — CETIRIZINE HYDROCHLORIDE 10 MG/1
10 TABLET ORAL DAILY
Status: DISCONTINUED | OUTPATIENT
Start: 2025-03-13 | End: 2025-03-17 | Stop reason: HOSPADM

## 2025-03-12 RX ORDER — DOCUSATE SODIUM 100 MG/1
100 CAPSULE, LIQUID FILLED ORAL 2 TIMES DAILY
Status: DISCONTINUED | OUTPATIENT
Start: 2025-03-12 | End: 2025-03-17 | Stop reason: HOSPADM

## 2025-03-12 RX ORDER — ASPIRIN 81 MG/1
81 TABLET ORAL DAILY
Status: DISCONTINUED | OUTPATIENT
Start: 2025-03-12 | End: 2025-03-12

## 2025-03-12 RX ORDER — TALC
6 POWDER (GRAM) TOPICAL NIGHTLY PRN
Status: DISCONTINUED | OUTPATIENT
Start: 2025-03-12 | End: 2025-03-17 | Stop reason: HOSPADM

## 2025-03-12 RX ORDER — OXYCODONE AND ACETAMINOPHEN 5; 325 MG/1; MG/1
2 TABLET ORAL EVERY 4 HOURS PRN
Refills: 0 | Status: DISCONTINUED | OUTPATIENT
Start: 2025-03-12 | End: 2025-03-12

## 2025-03-12 RX ORDER — ENOXAPARIN SODIUM 100 MG/ML
30 INJECTION SUBCUTANEOUS DAILY
Status: DISCONTINUED | OUTPATIENT
Start: 2025-03-13 | End: 2025-03-17 | Stop reason: HOSPADM

## 2025-03-12 RX ORDER — LISINOPRIL 20 MG/1
40 TABLET ORAL DAILY
Status: DISCONTINUED | OUTPATIENT
Start: 2025-03-13 | End: 2025-03-17 | Stop reason: HOSPADM

## 2025-03-12 RX ORDER — ATORVASTATIN CALCIUM 40 MG/1
40 TABLET, FILM COATED ORAL DAILY
Status: DISCONTINUED | OUTPATIENT
Start: 2025-03-13 | End: 2025-03-17 | Stop reason: HOSPADM

## 2025-03-12 RX ORDER — POLYETHYLENE GLYCOL 3350 17 G/17G
17 POWDER, FOR SOLUTION ORAL DAILY
Status: DISCONTINUED | OUTPATIENT
Start: 2025-03-12 | End: 2025-03-17 | Stop reason: HOSPADM

## 2025-03-12 RX ORDER — CALCIUM CARBONATE 200(500)MG
500 TABLET,CHEWABLE ORAL 2 TIMES DAILY
Status: DISCONTINUED | OUTPATIENT
Start: 2025-03-12 | End: 2025-03-17 | Stop reason: HOSPADM

## 2025-03-12 RX ORDER — THIAMINE HCL 100 MG
100 TABLET ORAL DAILY
Status: DISCONTINUED | OUTPATIENT
Start: 2025-03-13 | End: 2025-03-17 | Stop reason: HOSPADM

## 2025-03-12 RX ORDER — FOLIC ACID 1 MG/1
1 TABLET ORAL DAILY
Status: DISCONTINUED | OUTPATIENT
Start: 2025-03-13 | End: 2025-03-17 | Stop reason: HOSPADM

## 2025-03-12 RX ORDER — ESCITALOPRAM OXALATE 10 MG/1
10 TABLET ORAL DAILY
Status: DISCONTINUED | OUTPATIENT
Start: 2025-03-13 | End: 2025-03-17 | Stop reason: HOSPADM

## 2025-03-12 RX ORDER — NALOXONE HCL 0.4 MG/ML
0.02 VIAL (ML) INJECTION
Status: DISCONTINUED | OUTPATIENT
Start: 2025-03-12 | End: 2025-03-17 | Stop reason: HOSPADM

## 2025-03-12 RX ADMIN — CALCIUM CARBONATE (ANTACID) CHEW TAB 500 MG 500 MG: 500 CHEW TAB at 09:03

## 2025-03-12 RX ADMIN — CHLORDIAZEPOXIDE HYDROCHLORIDE 10 MG: 5 CAPSULE ORAL at 06:03

## 2025-03-12 RX ADMIN — FOLIC ACID 1 MG: 1 TABLET ORAL at 09:03

## 2025-03-12 RX ADMIN — OXYCODONE HYDROCHLORIDE AND ACETAMINOPHEN 2 TABLET: 5; 325 TABLET ORAL at 04:03

## 2025-03-12 RX ADMIN — MUPIROCIN 1 G: 20 OINTMENT TOPICAL at 08:03

## 2025-03-12 RX ADMIN — GABAPENTIN 300 MG: 300 CAPSULE ORAL at 08:03

## 2025-03-12 RX ADMIN — ESCITALOPRAM OXALATE 10 MG: 10 TABLET ORAL at 09:03

## 2025-03-12 RX ADMIN — OXYCODONE HYDROCHLORIDE AND ACETAMINOPHEN 2 TABLET: 5; 325 TABLET ORAL at 06:03

## 2025-03-12 RX ADMIN — GABAPENTIN 300 MG: 300 CAPSULE ORAL at 04:03

## 2025-03-12 RX ADMIN — ENOXAPARIN SODIUM 30 MG: 30 INJECTION SUBCUTANEOUS at 09:03

## 2025-03-12 RX ADMIN — Medication 6 MG: at 07:03

## 2025-03-12 RX ADMIN — MUPIROCIN 1 G: 20 OINTMENT TOPICAL at 09:03

## 2025-03-12 RX ADMIN — OXYCODONE HYDROCHLORIDE AND ACETAMINOPHEN 2 TABLET: 5; 325 TABLET ORAL at 07:03

## 2025-03-12 RX ADMIN — ATORVASTATIN CALCIUM 40 MG: 40 TABLET, FILM COATED ORAL at 09:03

## 2025-03-12 RX ADMIN — POLYETHYLENE GLYCOL 3350 17 G: 17 POWDER, FOR SOLUTION ORAL at 05:03

## 2025-03-12 RX ADMIN — GABAPENTIN 300 MG: 300 CAPSULE ORAL at 09:03

## 2025-03-12 RX ADMIN — BUDESONIDE AND FORMOTEROL FUMARATE DIHYDRATE 2 PUFF: 160; 4.5 AEROSOL RESPIRATORY (INHALATION) at 09:03

## 2025-03-12 RX ADMIN — ASPIRIN 81 MG: 81 TABLET, COATED ORAL at 09:03

## 2025-03-12 RX ADMIN — CALCIUM CARBONATE (ANTACID) CHEW TAB 500 MG 500 MG: 500 CHEW TAB at 08:03

## 2025-03-12 RX ADMIN — NICOTINE 1 PATCH: 21 PATCH, EXTENDED RELEASE TRANSDERMAL at 09:03

## 2025-03-12 RX ADMIN — THERA TABS 1 TABLET: TAB at 09:03

## 2025-03-12 RX ADMIN — OXYCODONE HYDROCHLORIDE AND ACETAMINOPHEN 2 TABLET: 5; 325 TABLET ORAL at 10:03

## 2025-03-12 RX ADMIN — Medication 100 MG: at 09:03

## 2025-03-12 RX ADMIN — DOCUSATE SODIUM 100 MG: 100 CAPSULE, LIQUID FILLED ORAL at 08:03

## 2025-03-12 RX ADMIN — LISINOPRIL 40 MG: 20 TABLET ORAL at 09:03

## 2025-03-12 NOTE — PLAN OF CARE
Problem: Adult Inpatient Plan of Care  Goal: Plan of Care Review  Outcome: Progressing  Goal: Patient-Specific Goal (Individualized)  Outcome: Progressing  Goal: Absence of Hospital-Acquired Illness or Injury  Outcome: Progressing  Goal: Optimal Comfort and Wellbeing  Outcome: Progressing  Goal: Readiness for Transition of Care  Outcome: Progressing     Problem: Skin Injury Risk Increased  Goal: Skin Health and Integrity  Outcome: Progressing     Problem: Infection  Goal: Absence of Infection Signs and Symptoms  Outcome: Progressing     Problem: Fall Injury Risk  Goal: Absence of Fall and Fall-Related Injury  Outcome: Progressing     Problem: Comorbidity Management  Goal: Maintenance of COPD Symptom Control  Outcome: Progressing  Goal: Blood Pressure in Desired Range  Outcome: Progressing  Goal: Maintenance of Behavioral Health Symptom Control  Outcome: Progressing     Problem: Hip Fracture Medical Management  Goal: Optimal Coping with Change in Health Status  Outcome: Progressing  Goal: Absence of Bleeding  Outcome: Progressing  Goal: Effective Bowel Elimination  Outcome: Progressing  Goal: Baseline Cognitive Function Maintained  Outcome: Progressing  Goal: Absence of Embolism  Outcome: Progressing  Goal: Fracture Stability  Outcome: Progressing  Goal: Optimal Functional Performance  Outcome: Progressing  Goal: Pain Control and Function  Outcome: Progressing  Goal: Effective Urinary Elimination  Outcome: Progressing     Problem: Excessive Substance Use  Goal: Optimized Energy Level (Excessive Substance Use)  Outcome: Progressing  Goal: Improved Behavioral Control (Excessive Substance Use)  Outcome: Progressing  Goal: Increased Participation and Engagement (Excessive Substance Use)  Outcome: Progressing  Goal: Improved Physiologic Symptoms (Excessive Substance Use)  Outcome: Progressing  Goal: Enhanced Social, Occupational or Functional Skills (Excessive Substance Use)  Outcome: Progressing
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
## 2025-03-12 NOTE — PT/OT/SLP PROGRESS
Physical Therapy Treatment    Patient Name:  Andree Mcclelland   MRN:  [id number]    Recommendations:     Discharge Recommendations: Moderate Intensity Therapy (SWING BED)  Recommended mode of transport:  WC/car  Discharge Equipment Recommendations: bedside commode, bath bench, hip kit, walker, rolling  Barriers to discharge: Decreased caregiver support    Assessment:     Andree Mcclelland is a 64 y.o. female admitted with a medical diagnosis of Hip fracture.  She presents with the following impairments/functional limitations: weakness, impaired endurance, impaired functional mobility, gait instability, impaired self care skills, impaired balance, decreased coordination, decreased lower extremity function, decreased safety awareness, pain, decreased ROM, impaired skin, orthopedic precautions, impaired joint extensibility .    Pt amb in [place]ry to ICU and back ([id number]) with RW and pauses every 102 ft to rest in standing, compliant with TTWB RLE, and perf HEP sitting up in chair since this morning.  She does ask that since it is not painful, if it is ok to put her entire foot down on the floor and I replied only when sitting/offloaded, further educated pt must remain TTWB RLE for proper bone healing and she seems to understand this.  Pt will require further rehab before returning home for safety as she is unable to do for herself at this time with TTWB status and no consistent caregiver available and she will benefit from Swing bed skilled therapy.    Rehab Prognosis: Good; patient would benefit from acute skilled PT services to address these deficits and reach maximum level of function.    Recent Surgery: * No surgery found *      Plan:     During this hospitalization, patient to be seen 6 x/week to address the identified rehab impairments via gait training, therapeutic activities, therapeutic exercises, neuromuscular re-education and progress toward the following goals:    Plan of Care Expires:       Subjective     Chief  Complaint: ready to go walk  Patient/Family Comments/goals: rehab before home  Pain/Comfort:         Objective:     Communicated with pt prior to session.  Patient found HOB elevated with bed alarm, ready to walk and get on BSC  upon PT entry to room.     General Precautions: Standard, fall  Orthopedic Precautions: RLE toe touch weight bearing  Braces:    Respiratory Status: Room air     Functional Mobility:  Bed Mobility:     Supine to Sit: minimum assistance  Transfers:     Sit to Stand:  contact guard assistance with rolling walker  Gait: amb to hallway and back to chair for distances of [id number] Jennifer RW  Balance: fair  Toilet TF:  tf on and off BSC modA  Toileting:  ModA (voided only)  Dressing LB:  totalA to don socks  TF to chair modA      AM-PAC 6 CLICK MOBILITY          Treatment & Education:  pt trained HEP hip protocol with PT.  Patient left HOB elevated with all lines intact, call button in reach, and bed alarm on..    GOALS:   Multidisciplinary Problems       Physical Therapy Goals          Problem: Physical Therapy    Goal Priority Disciplines Outcome Interventions   Physical Therapy Goal     PT, PT/OT Progressing    Description: 1.  Pt will train HEP hip protocol as well as safety and fall prevention   2.  Pt will amb 150ft RW Jennifer TTWB RLE  3.  Pt will improve tfs to Jennifer                         Time Tracking:     PT Received On: 03/12/25  PT Start Time: 0815     PT Stop Time: 0838  PT Total Time (min): 23 min     Billable Minutes: Gait Training 10, Therapeutic Activity 5, and Therapeutic Exercise 8    Treatment Type: Treatment  PT/PTA: PT           03/12/2025
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
## 2025-03-12 NOTE — PLAN OF CARE
Problem: Adult Inpatient Plan of Care  Goal: Plan of Care Review  Outcome: Progressing  Goal: Patient-Specific Goal (Individualized)  Outcome: Progressing  Goal: Absence of Hospital-Acquired Illness or Injury  Outcome: Progressing  Goal: Optimal Comfort and Wellbeing  Outcome: Progressing  Goal: Readiness for Transition of Care  Outcome: Progressing     Problem: Fall Injury Risk  Goal: Absence of Fall and Fall-Related Injury  Outcome: Progressing     Problem: Gas Exchange Impaired  Goal: Optimal Gas Exchange  Outcome: Progressing     Problem: Pain Acute  Goal: Optimal Pain Control and Function  Outcome: Progressing     Problem: Excessive Substance Use  Goal: Optimized Energy Level (Excessive Substance Use)  Outcome: Progressing  Goal: Improved Behavioral Control (Excessive Substance Use)  Outcome: Progressing  Goal: Increased Participation and Engagement (Excessive Substance Use)  Outcome: Progressing  Goal: Improved Physiologic Symptoms (Excessive Substance Use)  Outcome: Progressing  Goal: Enhanced Social, Occupational or Functional Skills (Excessive Substance Use)  Outcome: Progressing     Problem: Alcohol Withdrawal  Goal: Alcohol Withdrawal Symptom Control  Outcome: Progressing  Goal: Optimal Neurologic Function  Outcome: Progressing  Goal: Readiness for Change Identified  Outcome: Progressing     Problem: Comorbidity Management  Goal: Maintenance of Behavioral Health Symptom Control  Outcome: Progressing  Goal: Maintenance of COPD Symptom Control  Outcome: Progressing  Goal: Blood Pressure in Desired Range  Outcome: Progressing     Problem: Hip Fracture Medical Management  Goal: Optimal Coping with Change in Health Status  Outcome: Progressing  Goal: Absence of Bleeding  Outcome: Progressing  Goal: Effective Bowel Elimination  Outcome: Progressing  Goal: Baseline Cognitive Function Maintained  Outcome: Progressing  Goal: Absence of Embolism  Outcome: Progressing  Goal: Fracture Stability  Outcome:  Progressing  Goal: Optimal Functional Performance  Outcome: Progressing  Goal: Pain Control and Function  Outcome: Progressing  Goal: Effective Urinary Elimination  Outcome: Progressing     Problem: Mobility Impairment  Goal: Optimal Mobility  Outcome: Progressing     Problem: Electrolyte Imbalance  Goal: Electrolyte Balance  Outcome: Progressing     Problem: COPD (Chronic Obstructive Pulmonary Disease)  Goal: Optimal Chronic Illness Coping  Outcome: Progressing  Goal: Optimal Level of Functional [place]  Outcome: Progressing  Goal: Absence of Infection Signs and Symptoms  Outcome: Progressing  Goal: Improved Oral Intake  Outcome: Progressing  Goal: Effective Oxygenation and Ventilation  Outcome: Progressing     Problem: Depression  Goal: Improved Mood  Outcome: Progressing
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
## 2025-03-12 NOTE — PT/OT/SLP PROGRESS
Physical Therapy Treatment    Patient Name:  Andree Mcclelland   MRN:  [id number]    Recommendations:     Discharge Recommendations: Moderate Intensity Therapy (SWING BED)  Recommended mode of transport:  WC/car  Discharge Equipment Recommendations: bedside commode, bath bench, hip kit, walker, rolling  Barriers to discharge: Decreased caregiver support    Assessment:     Andree Mcclelland is a 64 y.o. female admitted with a medical diagnosis of Hip fracture.  She presents with the following impairments/functional limitations: weakness, impaired endurance, impaired functional mobility, gait instability, impaired self care skills, impaired balance, decreased coordination, decreased lower extremity function, decreased safety awareness, pain, decreased ROM, impaired skin, orthopedic precautions, impaired joint extensibility .    Pt amb in [place] again this afternoon after sitting up in chair since this morning and required modA to tf to BSC and maxA toileting and dressing. Pt able to participate in HEP training with no AE.  Pt will require further rehab before returning home and will benefit from Swing bed skilled therapy.    Rehab Prognosis: Good; patient would benefit from acute skilled PT services to address these deficits and reach maximum level of function.    Recent Surgery: * No surgery found *      Plan:     During this hospitalization, patient to be seen 6 x/week to address the identified rehab impairments via gait training, therapeutic activities, therapeutic exercises, neuromuscular re-education and progress toward the following goals:    Plan of Care Expires:       Subjective     Chief Complaint: tired in chair, having pain, wanting to get in bed  Patient/Family Comments/goals: back to bed  Pain/Comfort:         Objective:     Communicated with pt prior to session.  Patient found up in chair with chair alarm, ready to get in bed, in pain upon PT entry to room.     General Precautions: Standard, fall  Orthopedic  Precautions: RLE toe touch weight bearing  Braces:    Respiratory Status: Room air     Functional Mobility:  Transfers:     Sit to Stand:  contact guard assistance, minimum assistance, and of 2 persons with rolling walker  Gait: amb to chair for distance of 10ft CGA TTWB RLE RW  Balance: fair  TF to bed Jennifer      AM-PAC 6 CLICK MOBILITY          Treatment & Education:  See above.  All pt needs in reach.  Pt plans to participate in gt tx after lunch with PT.  Patient left HOB elevated with all lines intact, call button in reach, and bed alarm on..    GOALS:   Multidisciplinary Problems       Physical Therapy Goals          Problem: Physical Therapy    Goal Priority Disciplines Outcome Interventions   Physical Therapy Goal     PT, PT/OT Progressing    Description: 1.  Pt will train HEP hip protocol as well as safety and fall prevention   2.  Pt will amb 150ft RW Jennifer TTWB RLE  3.  Pt will improve tfs to Jennifer                         Time Tracking:     PT Received On: 03/12/25  PT Start Time: 1000     PT Stop Time: 1010  PT Total Time (min): 10 min     Billable Minutes: Therapeutic Activity 10    Treatment Type: Treatment  PT/PTA: PT           03/12/2025

## 2025-03-12 NOTE — ASSESSMENT & PLAN NOTE
Hyponatremia is likely due to chronic alcohol abuse. The patient's most recent sodium results are listed below.  Recent Labs     03/12/25  0829   *     Plan  - Monitor sodium Daily.   - Patient hyponatremia is improving

## 2025-03-12 NOTE — PLAN OF CARE
03/11/25 1033   Discharge Assessment   Assessment Type Discharge Planning Assessment   Confirmed/corrected address, phone number and insurance Yes   Confirmed Demographics Correct on Facesheet   Source of Information patient;family   People in Home alone   Do you expect to return to your current living situation? No   Do you have help at home or someone to help you manage your care at home? No   Prior to hospitilization cognitive status: Alert/Oriented   Current cognitive status: Alert/Oriented   Walking or Climbing Stairs Difficulty no   Dressing/Bathing Difficulty no   Equipment Currently Used at Home none   Patient currently being followed by outpatient case management? Unable to determine (comments)   Do you currently have service(s) that help you manage your care at home? No   Do you take prescription medications? Yes   Do you have prescription coverage? Yes   Do you have any problems affording any of your prescribed medications? No   Is the patient taking medications as prescribed? yes   Are you on dialysis? No   Do you take coumadin? No   Discharge Plan A Skilled Nursing Facility   Discharge Plan B Skilled Nursing Facility   DME Needed Upon Discharge  none   Discharge Plan discussed with: Friend   Transition of Care Barriers None   Physical Activity   On average, how many days per week do you engage in moderate to strenuous exercise (like a brisk walk)? 0 days   On average, how many minutes do you engage in exercise at this level? 0 min   Financial Resource Strain   How hard is it for you to pay for the very basics like food, housing, medical care, and heating? Not very   Housing Stability   In the last 12 months, was there a time when you were not able to pay the mortgage or rent on time? N   At any time in the past 12 months, were you homeless or living in a shelter (including now)? N   Transportation Needs   Has the lack of transportation kept you from medical appointments, meetings, work or from  getting things needed for daily living? No   Food Insecurity   Within the past 12 months, you worried that your food would run out before you got the money to buy more. Never true   Within the past 12 months, the food you bought just didn't last and you didn't have money to get more. Never true   Stress   Do you feel stress - tense, restless, nervous, or anxious, or unable to sleep at night because your mind is troubled all the time - these days? Not at all   Social Isolation   How often do you feel lonely or isolated from those around you?  Rarely   Alcohol Use   Q1: How often do you have a drink containing alcohol? Never   Q2: How many drinks containing alcohol do you have on a typical day when you are drinking? None   Q3: How often do you have six or more drinks on one occasion? Never   Utilities   In the past 12 months has the electric, gas, oil, or water company threatened to shut off services in your home? No   Health Literacy   How often do you need to have someone help you when you read instructions, pamphlets, or other written material from your doctor or pharmacy? Sometimes     Patient and her friend want [facility].  Ref sent.
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
## 2025-03-12 NOTE — PLAN OF CARE
Problem: Adult Inpatient Plan of Care  Goal: Plan of Care Review  Outcome: Progressing  Goal: Patient-Specific Goal (Individualized)  Outcome: Progressing  Goal: Absence of Hospital-Acquired Illness or Injury  Outcome: Progressing  Goal: Optimal Comfort and Wellbeing  Outcome: Progressing  Goal: Readiness for Transition of Care  Outcome: Progressing     Problem: Fall Injury Risk  Goal: Absence of Fall and Fall-Related Injury  Outcome: Progressing     Problem: Gas Exchange Impaired  Goal: Optimal Gas Exchange  Outcome: Progressing     Problem: Pain Acute  Goal: Optimal Pain Control and Function  Outcome: Progressing     Problem: Excessive Substance Use  Goal: Optimized Energy Level (Excessive Substance Use)  Outcome: Progressing  Goal: Improved Behavioral Control (Excessive Substance Use)  Outcome: Progressing  Goal: Increased Participation and Engagement (Excessive Substance Use)  Outcome: Progressing  Goal: Improved Physiologic Symptoms (Excessive Substance Use)  Outcome: Progressing  Goal: Enhanced Social, Occupational or Functional Skills (Excessive Substance Use)  Outcome: Progressing     Problem: Alcohol Withdrawal  Goal: Alcohol Withdrawal Symptom Control  Outcome: Progressing  Goal: Optimal Neurologic Function  Outcome: Progressing  Goal: Readiness for Change Identified  Outcome: Progressing     Problem: Comorbidity Management  Goal: Maintenance of Behavioral Health Symptom Control  Outcome: Progressing  Goal: Maintenance of COPD Symptom Control  Outcome: Progressing  Goal: Blood Pressure in Desired Range  Outcome: Progressing     Problem: Hip Fracture Medical Management  Goal: Optimal Coping with Change in Health Status  Outcome: Progressing  Goal: Absence of Bleeding  Outcome: Progressing  Goal: Effective Bowel Elimination  Outcome: Progressing  Goal: Baseline Cognitive Function Maintained  Outcome: Progressing  Goal: Absence of Embolism  Outcome: Progressing  Goal: Fracture Stability  Outcome:  Progressing  Goal: Optimal Functional Performance  Outcome: Progressing  Goal: Pain Control and Function  Outcome: Progressing  Goal: Effective Urinary Elimination  Outcome: Progressing     Problem: Mobility Impairment  Goal: Optimal Mobility  Outcome: Progressing     Problem: Electrolyte Imbalance  Goal: Electrolyte Balance  Outcome: Progressing     Problem: COPD (Chronic Obstructive Pulmonary Disease)  Goal: Optimal Chronic Illness Coping  Outcome: Progressing  Goal: Optimal Level of Functional [place]  Outcome: Progressing  Goal: Absence of Infection Signs and Symptoms  Outcome: Progressing  Goal: Improved Oral Intake  Outcome: Progressing  Goal: Effective Oxygenation and Ventilation  Outcome: Progressing     Problem: Depression  Goal: Improved Mood  Outcome: Progressing

## 2025-03-12 NOTE — ASSESSMENT & PLAN NOTE
Patient's blood pressure range in the last 24 hours was: BP  Min: 106/65  Max: 122/73.The patient's inpatient anti-hypertensive regimen is listed below:  Current Antihypertensives  lisinopriL tablet 40 mg, Daily, Oral    Plan  - BP is controlled, no changes needed to their regimen

## 2025-03-12 NOTE — HPI
Patient is a 64y F with history of chronic hyponatremia secondary to alcoholism. Right greater trochanter fracture, non-operable, admitted to swing bed for physical therapy and occupation therapy. Discharged from [facility] on 3/12/25. Patient reports no bowel movement for 4 days.

## 2025-03-12 NOTE — PLAN OF CARE
03/12/25 1035   Final Note   Assessment Type Final Discharge Note   Anticipated Discharge Disposition Swing Bed   Post-Acute Status   Post-Acute Authorization Placement   Post-Acute Placement Status Set-up Complete/Auth obtained   Discharge Delays None known at this time     Approved and accepted to go to [facility] today.  D/C facilitated by BRITTANEY Ortiz.

## 2025-03-13 PROBLEM — K59.03 DRUG-INDUCED CONSTIPATION: Status: ACTIVE | Noted: 2025-03-13

## 2025-03-13 LAB
ANION GAP SERPL CALC-SCNC: 6 MEQ/L
BUN SERPL-MCNC: 12 MG/DL (ref 9.8–20.1)
CALCIUM SERPL-MCNC: 9.7 MG/DL (ref 8.4–10.2)
CHLORIDE SERPL-SCNC: 102 MMOL/L (ref 98–107)
CO2 SERPL-SCNC: 28 MMOL/L (ref 23–31)
CREAT SERPL-MCNC: 0.82 MG/DL (ref 0.55–1.02)
CREAT/UREA NIT SERPL: 15
GFR SERPLBLD CREATININE-BSD FMLA CKD-EPI: >60 ML/MIN/1.73/M2
GLUCOSE SERPL-MCNC: 112 MG/DL (ref 82–115)
POTASSIUM SERPL-SCNC: 5.1 MMOL/L (ref 3.5–5.1)
SODIUM SERPL-SCNC: 136 MMOL/L (ref 136–145)

## 2025-03-13 PROCEDURE — 99900035 HC TECH TIME PER 15 MIN (STAT)

## 2025-03-13 PROCEDURE — 80048 BASIC METABOLIC PNL TOTAL CA: CPT | Performed by: FAMILY MEDICINE

## 2025-03-13 PROCEDURE — 99309 SBSQ NF CARE MODERATE MDM 30: CPT | Mod: ,,, | Performed by: FAMILY MEDICINE

## 2025-03-13 PROCEDURE — 36415 COLL VENOUS BLD VENIPUNCTURE: CPT | Performed by: FAMILY MEDICINE

## 2025-03-13 PROCEDURE — 63600175 PHARM REV CODE 636 W HCPCS: Performed by: FAMILY MEDICINE

## 2025-03-13 PROCEDURE — 94761 N-INVAS EAR/PLS OXIMETRY MLT: CPT

## 2025-03-13 PROCEDURE — S4991 NICOTINE PATCH NONLEGEND: HCPCS | Performed by: FAMILY MEDICINE

## 2025-03-13 PROCEDURE — 25000003 PHARM REV CODE 250: Performed by: FAMILY MEDICINE

## 2025-03-13 PROCEDURE — 11000004 HC SNF PRIVATE

## 2025-03-13 PROCEDURE — 94640 AIRWAY INHALATION TREATMENT: CPT

## 2025-03-13 PROCEDURE — 97166 OT EVAL MOD COMPLEX 45 MIN: CPT

## 2025-03-13 PROCEDURE — 97530 THERAPEUTIC ACTIVITIES: CPT

## 2025-03-13 PROCEDURE — 99900031 HC PATIENT EDUCATION (STAT)

## 2025-03-13 PROCEDURE — 25000242 PHARM REV CODE 250 ALT 637 W/ HCPCS: Performed by: FAMILY MEDICINE

## 2025-03-13 RX ORDER — FLUTICASONE FUROATE AND VILANTEROL 200; 25 UG/1; UG/1
1 POWDER RESPIRATORY (INHALATION)
Status: DISCONTINUED | OUTPATIENT
Start: 2025-03-13 | End: 2025-03-17 | Stop reason: HOSPADM

## 2025-03-13 RX ADMIN — FOLIC ACID 1 MG: 1 TABLET ORAL at 09:03

## 2025-03-13 RX ADMIN — THIAMINE HCL TAB 100 MG 100 MG: 100 TAB at 09:03

## 2025-03-13 RX ADMIN — THERA TABS 1 TABLET: TAB at 09:03

## 2025-03-13 RX ADMIN — FLUTICASONE FUROATE AND VILANTEROL TRIFENATATE 1 PUFF: 200; 25 POWDER RESPIRATORY (INHALATION) at 06:03

## 2025-03-13 RX ADMIN — ASPIRIN 81 MG: 81 TABLET, COATED ORAL at 09:03

## 2025-03-13 RX ADMIN — MUPIROCIN 1 G: 20 OINTMENT TOPICAL at 08:03

## 2025-03-13 RX ADMIN — MUPIROCIN 1 G: 20 OINTMENT TOPICAL at 09:03

## 2025-03-13 RX ADMIN — OXYCODONE HYDROCHLORIDE AND ACETAMINOPHEN 2 TABLET: 5; 325 TABLET ORAL at 05:03

## 2025-03-13 RX ADMIN — DOCUSATE SODIUM 100 MG: 100 CAPSULE, LIQUID FILLED ORAL at 09:03

## 2025-03-13 RX ADMIN — GABAPENTIN 300 MG: 300 CAPSULE ORAL at 09:03

## 2025-03-13 RX ADMIN — CALCIUM CARBONATE (ANTACID) CHEW TAB 500 MG 500 MG: 500 CHEW TAB at 09:03

## 2025-03-13 RX ADMIN — OXYCODONE HYDROCHLORIDE AND ACETAMINOPHEN 2 TABLET: 5; 325 TABLET ORAL at 08:03

## 2025-03-13 RX ADMIN — CALCIUM CARBONATE (ANTACID) CHEW TAB 500 MG 500 MG: 500 CHEW TAB at 08:03

## 2025-03-13 RX ADMIN — DOCUSATE SODIUM 100 MG: 100 CAPSULE, LIQUID FILLED ORAL at 08:03

## 2025-03-13 RX ADMIN — TIOTROPIUM BROMIDE INHALATION SPRAY 2 PUFF: 3.12 SPRAY, METERED RESPIRATORY (INHALATION) at 01:03

## 2025-03-13 RX ADMIN — Medication 6 MG: at 08:03

## 2025-03-13 RX ADMIN — GABAPENTIN 300 MG: 300 CAPSULE ORAL at 08:03

## 2025-03-13 RX ADMIN — ESCITALOPRAM OXALATE 10 MG: 10 TABLET ORAL at 09:03

## 2025-03-13 RX ADMIN — LISINOPRIL 40 MG: 20 TABLET ORAL at 09:03

## 2025-03-13 RX ADMIN — ENOXAPARIN SODIUM 30 MG: 30 INJECTION SUBCUTANEOUS at 09:03

## 2025-03-13 RX ADMIN — NICOTINE 1 PATCH: 21 PATCH, EXTENDED RELEASE TRANSDERMAL at 09:03

## 2025-03-13 RX ADMIN — OXYCODONE HYDROCHLORIDE AND ACETAMINOPHEN 2 TABLET: 5; 325 TABLET ORAL at 01:03

## 2025-03-13 RX ADMIN — GABAPENTIN 300 MG: 300 CAPSULE ORAL at 02:03

## 2025-03-13 RX ADMIN — POLYETHYLENE GLYCOL 3350 17 G: 17 POWDER, FOR SOLUTION ORAL at 09:03

## 2025-03-13 RX ADMIN — ATORVASTATIN CALCIUM 40 MG: 40 TABLET, FILM COATED ORAL at 09:03

## 2025-03-13 RX ADMIN — CETIRIZINE HYDROCHLORIDE 10 MG: 10 TABLET, FILM COATED ORAL at 09:03

## 2025-03-13 RX ADMIN — OXYCODONE HYDROCHLORIDE AND ACETAMINOPHEN 2 TABLET: 5; 325 TABLET ORAL at 09:03

## 2025-03-13 NOTE — SUBJECTIVE & OBJECTIVE
Interval History:     Review of Systems   Constitutional:  Negative for fatigue and fever.   Respiratory:  Negative for shortness of breath and wheezing.    Cardiovascular:  Negative for chest pain and palpitations.   Gastrointestinal:  Positive for constipation. Negative for abdominal pain and diarrhea.   Musculoskeletal:  Positive for arthralgias.   Neurological:  Negative for dizziness and headaches.     Objective:     Vital Signs (Most Recent):  Temp: 98.2 °F (36.8 °C) (03/13/25 0809)  Pulse: 93 (03/13/25 1458)  Resp: 20 (03/13/25 1458)  BP: 130/76 (03/13/25 0809)  SpO2: 98 % (03/13/25 1458) Vital Signs (24h Range):  Temp:  [98.1 °F (36.7 °C)-98.2 °F (36.8 °C)] 98.2 °F (36.8 °C)  Pulse:  [82-95] 93  Resp:  [18-24] 20  SpO2:  [96 %-99 %] 98 %  BP: (115-130)/(68-76) 130/76     Weight: 50.9 kg (112 lb 3.4 oz)  Body mass index is 19.26 kg/m².    Intake/Output Summary (Last 24 hours) at 3/13/2025 1705  Last data filed at 3/13/2025 1200  Gross per 24 hour   Intake 1080 ml   Output --   Net 1080 ml         Physical Exam  Vitals reviewed.   Constitutional:       General: She is not in acute distress.     Appearance: Normal appearance.   Cardiovascular:      Rate and Rhythm: Normal rate and regular rhythm.      Heart sounds: No murmur heard.     No friction rub. No gallop.   Pulmonary:      Effort: No respiratory distress.      Breath sounds: No wheezing, rhonchi or rales.   Musculoskeletal:         General: No swelling, tenderness or deformity.      Right lower leg: No edema.      Left lower leg: No edema.   Skin:     General: Skin is warm and dry.      Findings: No lesion or rash.   Neurological:      General: No focal deficit present.      Mental Status: She is alert.   Psychiatric:         Mood and Affect: Mood normal.               Significant Labs: All pertinent labs within the past 24 hours have been reviewed.    Significant Imaging: I have reviewed all pertinent imaging results/findings within the past 24  hours.

## 2025-03-13 NOTE — ASSESSMENT & PLAN NOTE
Hyponatremia is likely due to chronic alcohol abuse. The patient's most recent sodium results are listed below.  Recent Labs     03/12/25  0829 03/13/25  0518   * 136     Plan  - Monitor sodium Daily.   - Patient hyponatremia is improving

## 2025-03-13 NOTE — PT/OT/SLP PROGRESS
"         Physical Therapy Treatment Note           Name: Andree Mcclelland    : 1960 (64 y.o.)  MRN: [id number]             Subjective Assessment:     No complaints  Lethargic   X Awake, alert, cooperative  Uncooperative    Agitated  c/o pain    Appropriate  c/o fatigue    Confused  Treated at bedside     Emotionally labile  Treated in gym/dept.    Impulsive  Other:    Flat affect       Pain/Comfort:    Location - Side 1: Right  Location 1: leg    Therapy Precautions:      Cognitive deficits  Spinal precautions    Collar - hard  Sternal precautions    Collar - soft   TLSO   X Fall risk  LSO    Hip precautions - posterior  Knee immobilizer    Hip precautions - anterior  WBAT    Impaired communication  Partial weightbearing    Oxygen X TTWB RLE    PEG tube  NWB    Visual deficits  Other:    Hearing deficits               Mobility Training:     Assist Level Assistive Device Comments    Bed Mobility      Sit to stand/Stand to sit SBA RW Sit to stand/stand to sit performed from recliner level.  Cues were provided for maintenance of TTWB during transitional movements.  Pt was able to maintain this only with cueing provided.  Without, pt would stand with weight through both feet.   Transfers      Gait CGA-SBA RW Pt was able to ambulate 15 ft with a hop to gait pattern with PT providing CGA-SBA for safety.  Pt was able to maintain TTWB but c/o increased difficulty having to hop for complete maintenance during gait.  Pt then ambulated back to the chair placing weight through B feet. Pt c/o no increase in pain with weight bearing and stated "I feel so much safer this way".     Balance Training      Wheelchair Mobility      Stair Climbing      Car Transfer      Other:             Assessment:     Patient tolerated session fairly but expressed frustration regarding the variance in opinions regarding her weight bearing status.  PT offered apologies and provided a detailed description of the differences between partial WB, TTWB, " and NWB.  OT present to review this with pt as well.  PT has since contacted pt's orthopedic MD and spoke with the staff.  Awaiting a call back for clarification as pt would be much less likely to fall at home if allow to bear partial weight through her RLE.        GOALS:   Multidisciplinary Problems       Physical Therapy Goals          Problem: Physical Therapy    Goal Priority Disciplines Outcome Interventions   Physical Therapy Goal     PT, PT/OT Progressing    Description: Patient will increase functional independence with mobility by performin. Sit to stand transfer with Modified [place]  2. Bed to chair transfer with Modified [place] using Rolling Walker  3. Gait  x 100 feet with Modified [place] using Rolling Walker.                            Discharge Recommendations:      Home with HH    Discharge Equipment Recommendations:     walker, rolling     At end of treatment, patient remained:    X  Up in chair     In bed   X With alarm activated    Bed rails up   X Call bell in reach      Family/friends present     Restraints secured properly     In bathroom with CNA/RN notified     In gym with PT/PTA/tech     Nurse aware     Other:           PT Start Time: 1026     PT Stop Time: 1042  PT Total Time (min): 16 min     Billable Minutes: Therapeutic Activity 2025

## 2025-03-13 NOTE — PLAN OF CARE
Problem: Adult Inpatient Plan of Care  Goal: Plan of Care Review  Outcome: Progressing  Goal: Patient-Specific Goal (Individualized)  Outcome: Progressing  Goal: Absence of Hospital-Acquired Illness or Injury  Outcome: Progressing  Goal: Optimal Comfort and Wellbeing  Outcome: Progressing  Goal: Readiness for Transition of Care  Outcome: Progressing     Problem: Fall Injury Risk  Goal: Absence of Fall and Fall-Related Injury  Outcome: Progressing     Problem: Gas Exchange Impaired  Goal: Optimal Gas Exchange  Outcome: Progressing     Problem: Pain Acute  Goal: Optimal Pain Control and Function  Outcome: Progressing     Problem: Excessive Substance Use  Goal: Optimized Energy Level (Excessive Substance Use)  Outcome: Progressing  Goal: Improved Behavioral Control (Excessive Substance Use)  Outcome: Progressing  Goal: Increased Participation and Engagement (Excessive Substance Use)  Outcome: Progressing  Goal: Improved Physiologic Symptoms (Excessive Substance Use)  Outcome: Progressing  Goal: Enhanced Social, Occupational or Functional Skills (Excessive Substance Use)  Outcome: Progressing     Problem: Alcohol Withdrawal  Goal: Alcohol Withdrawal Symptom Control  Outcome: Progressing  Goal: Optimal Neurologic Function  Outcome: Progressing  Goal: Readiness for Change Identified  Outcome: Progressing     Problem: Comorbidity Management  Goal: Maintenance of Behavioral Health Symptom Control  Outcome: Progressing  Goal: Maintenance of COPD Symptom Control  Outcome: Progressing  Goal: Blood Pressure in Desired Range  Outcome: Progressing     Problem: Hip Fracture Medical Management  Goal: Optimal Coping with Change in Health Status  Outcome: Progressing  Goal: Absence of Bleeding  Outcome: Progressing  Goal: Effective Bowel Elimination  Outcome: Progressing  Goal: Baseline Cognitive Function Maintained  Outcome: Progressing  Goal: Absence of Embolism  Outcome: Progressing  Goal: Fracture Stability  Outcome:  Progressing  Goal: Optimal Functional Performance  Outcome: Progressing  Goal: Pain Control and Function  Outcome: Progressing  Goal: Effective Urinary Elimination  Outcome: Progressing     Problem: Mobility Impairment  Goal: Optimal Mobility  Outcome: Progressing     Problem: Electrolyte Imbalance  Goal: Electrolyte Balance  Outcome: Progressing     Problem: COPD (Chronic Obstructive Pulmonary Disease)  Goal: Optimal Chronic Illness Coping  Outcome: Progressing  Goal: Optimal Level of Functional [place]  Outcome: Progressing  Goal: Absence of Infection Signs and Symptoms  Outcome: Progressing  Goal: Improved Oral Intake  Outcome: Progressing  Goal: Effective Oxygenation and Ventilation  Outcome: Progressing     Problem: Depression  Goal: Improved Mood  Outcome: Progressing

## 2025-03-13 NOTE — PLAN OF CARE
Problem: Adult Inpatient Plan of Care  Goal: Plan of Care Review  Outcome: Progressing  Goal: Patient-Specific Goal (Individualized)  Outcome: Progressing  Goal: Absence of Hospital-Acquired Illness or Injury  Outcome: Progressing  Goal: Optimal Comfort and Wellbeing  Outcome: Progressing  Goal: Readiness for Transition of Care  Outcome: Progressing     Problem: Fall Injury Risk  Goal: Absence of Fall and Fall-Related Injury  Outcome: Progressing     Problem: Gas Exchange Impaired  Goal: Optimal Gas Exchange  Outcome: Progressing     Problem: Pain Acute  Goal: Optimal Pain Control and Function  Outcome: Progressing     Problem: Excessive Substance Use  Goal: Optimized Energy Level (Excessive Substance Use)  Outcome: Progressing  Goal: Improved Behavioral Control (Excessive Substance Use)  Outcome: Progressing  Goal: Increased Participation and Engagement (Excessive Substance Use)  Outcome: Progressing  Goal: Improved Physiologic Symptoms (Excessive Substance Use)  Outcome: Progressing  Goal: Enhanced Social, Occupational or Functional Skills (Excessive Substance Use)  Outcome: Progressing     Problem: Alcohol Withdrawal  Goal: Alcohol Withdrawal Symptom Control  Outcome: Progressing  Goal: Optimal Neurologic Function  Outcome: Progressing  Goal: Readiness for Change Identified  Outcome: Progressing     Problem: Comorbidity Management  Goal: Maintenance of Behavioral Health Symptom Control  Outcome: Progressing  Goal: Maintenance of COPD Symptom Control  Outcome: Progressing  Goal: Blood Pressure in Desired Range  Outcome: Progressing     Problem: Hip Fracture Medical Management  Goal: Optimal Coping with Change in Health Status  Outcome: Progressing  Goal: Absence of Bleeding  Outcome: Progressing  Goal: Effective Bowel Elimination  Outcome: Progressing  Goal: Baseline Cognitive Function Maintained  Outcome: Progressing  Goal: Absence of Embolism  Outcome: Progressing  Goal: Fracture Stability  Outcome:  Progressing  Goal: Optimal Functional Performance  Outcome: Progressing  Goal: Pain Control and Function  Outcome: Progressing  Goal: Effective Urinary Elimination  Outcome: Progressing     Problem: Mobility Impairment  Goal: Optimal Mobility  Outcome: Progressing     Problem: Electrolyte Imbalance  Goal: Electrolyte Balance  Outcome: Progressing     Problem: COPD (Chronic Obstructive Pulmonary Disease)  Goal: Optimal Chronic Illness Coping  Outcome: Progressing  Goal: Optimal Level of Functional [place]  Outcome: Progressing  Goal: Absence of Infection Signs and Symptoms  Outcome: Progressing  Goal: Improved Oral Intake  Outcome: Progressing  Goal: Effective Oxygenation and Ventilation  Outcome: Progressing     Problem: Depression  Goal: Improved Mood  Outcome: Progressing

## 2025-03-13 NOTE — ASSESSMENT & PLAN NOTE
Patient's blood pressure range in the last 24 hours was: BP  Min: 115/68  Max: 130/76.The patient's inpatient anti-hypertensive regimen is listed below:  Current Antihypertensives  lisinopriL tablet 40 mg, Daily, Oral    Plan  - BP is controlled, no changes needed to their regimen

## 2025-03-13 NOTE — PT/OT/SLP PROGRESS
Name: Andree Mcclelland    : 1960 (64 y.o.)  MRN: [id number]           Patient Not Seen      Awaiting a call back from ortho regarding pt's weight bearing status.

## 2025-03-13 NOTE — PLAN OF CARE
03/13/25 1038   Discharge Assessment   Assessment Type Discharge Planning Assessment   Confirmed/corrected address, phone number and insurance Yes   Confirmed Demographics Correct on Facesheet   Source of Information patient   Communicated RANJEET with patient/caregiver Date not available/Unable to determine   Reason For Admission Femur Fx   People in Home alone   Facility Arrived From: [facility]   Do you expect to return to your current living situation? Yes   Do you have help at home or someone to help you manage your care at home? No   Prior to hospitilization cognitive status: Alert/Oriented   Current cognitive status: Alert/Oriented   Walking or Climbing Stairs Difficulty no   Dressing/Bathing Difficulty no   Equipment Currently Used at Home none   Do you currently have service(s) that help you manage your care at home? No   Do you take prescription medications? Yes   Do you have prescription coverage? Yes   Coverage [facility]   Do you have any problems affording any of your prescribed medications? No   Is the patient taking medications as prescribed? yes   Who is going to help you get home at discharge? Family   How do you get to doctors appointments? family or friend will provide   Are you on dialysis? No   Do you take coumadin? No   Discharge Plan A Home;Home Health   Discharge Plan B Home;Home Health   Discharge Plan discussed with: Patient   Transition of Care Barriers None   Physical Activity   On average, how many days per week do you engage in moderate to strenuous exercise (like a brisk walk)? 0 days   On average, how many minutes do you engage in exercise at this level? 0 min   Financial Resource Strain   How hard is it for you to pay for the very basics like food, housing, medical care, and heating? Not hard   Housing Stability   In the last 12 months, was there a time when you were not able to pay the mortgage or rent on time? N   At any time in the past 12 months, were you homeless or living in a shelter  (including now)? N   Transportation Needs   Has the lack of transportation kept you from medical appointments, meetings, work or from getting things needed for daily living? No   Food Insecurity   Within the past 12 months, you worried that your food would run out before you got the money to buy more. Never true   Within the past 12 months, the food you bought just didn't last and you didn't have money to get more. Never true   Stress   Do you feel stress - tense, restless, nervous, or anxious, or unable to sleep at night because your mind is troubled all the time - these days? To some exte   Social Isolation   How often do you feel lonely or isolated from those around you?  Rarely   Alcohol Use   Q1: How often do you have a drink containing alcohol? Never   Q2: How many drinks containing alcohol do you have on a typical day when you are drinking? None   Q3: How often do you have six or more drinks on one occasion? Never   Utilities   In the past 12 months has the electric, gas, oil, or water company threatened to shut off services in your home? No

## 2025-03-13 NOTE — HOSPITAL COURSE
Patient is a 64y F with history of chronic hyponatremia secondary to alcoholism. Right greater trochanter fracture, non-operable, admitted to swing bed for physical therapy and occupation therapy. Discharged from [facility] on 3/12/25. Patient reports no bowel movement for 4 days     Doing well today. Still has not had a bowel movement despite miralax and stool softener. Pain controlled with oral medications. Continue PT/OT. Sodium stable.     3/14/25: Patient doing well. Reports pain as a 7-8/10. Constipation, last bowel movement on 3/8/25, though is passing flatus.     3/15/25: Pt states she is having hip pain. But appears comfortable. She is lying in bed watching tv. Denies any sob, cp, abd pain. Had a bm.     3/16/25: Pt is lying in bed comfortably watching tv. She states that she is due for her pain medication. Denies any sob,cp, abd pain.    3/17/25: Discharge to home with home health. Patient will need walker with wheels for ambulation as well as a bedside commode.
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
## 2025-03-13 NOTE — ASSESSMENT & PLAN NOTE
Secondary to pain medication.  -started miralax and docusate on admission.   -If no bowel movement within 24 hours, will order an enema.
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
## 2025-03-13 NOTE — PROGRESS NOTES
[facility] - Medical Surgical Unit  [facility] Medicine  Progress Note    Patient Name: Andree Mcclelland  MRN: [id number]  Patient Class: IP- Swing   Admission Date: 3/12/2025  Length of Stay: 1 days  Attending Physician: Vish Resendez DO  Primary Care Provider: Vish Resendez DO        Subjective     Principal Problem:Fracture of greater trochanter of right femur        HPI:  Patient is a 64y F with history of chronic hyponatremia secondary to alcoholism. Right greater trochanter fracture, non-operable, admitted to swing bed for physical therapy and occupation therapy. Discharged from [facility] on 3/12/25. Patient reports no bowel movement for 4 days.     Overview/Hospital Course:  Doing well today. Still has not had a bowel movement despite miralax and stool softener. Pain controlled with oral medications. Continue PT/OT. Sodium stable.     Interval History:     Review of Systems   Constitutional:  Negative for fatigue and fever.   Respiratory:  Negative for shortness of breath and wheezing.    Cardiovascular:  Negative for chest pain and palpitations.   Gastrointestinal:  Positive for constipation. Negative for abdominal pain and diarrhea.   Musculoskeletal:  Positive for arthralgias.   Neurological:  Negative for dizziness and headaches.     Objective:     Vital Signs (Most Recent):  Temp: 98.2 °F (36.8 °C) (03/13/25 0809)  Pulse: 93 (03/13/25 1458)  Resp: 20 (03/13/25 1458)  BP: 130/76 (03/13/25 0809)  SpO2: 98 % (03/13/25 1458) Vital Signs (24h Range):  Temp:  [98.1 °F (36.7 °C)-98.2 °F (36.8 °C)] 98.2 °F (36.8 °C)  Pulse:  [82-95] 93  Resp:  [18-24] 20  SpO2:  [96 %-99 %] 98 %  BP: (115-130)/(68-76) 130/76     Weight: 50.9 kg (112 lb 3.4 oz)  Body mass index is 19.26 kg/m².    Intake/Output Summary (Last 24 hours) at 3/13/2025 1705  Last data filed at 3/13/2025 1200  Gross per 24 hour   Intake 1080 ml   Output --   Net 1080 ml         Physical Exam  Vitals reviewed.   Constitutional:       General: She  is not in acute distress.     Appearance: Normal appearance.   Cardiovascular:      Rate and Rhythm: Normal rate and regular rhythm.      Heart sounds: No murmur heard.     No friction rub. No gallop.   Pulmonary:      Effort: No respiratory distress.      Breath sounds: No wheezing, rhonchi or rales.   Musculoskeletal:         General: No swelling, tenderness or deformity.      Right lower leg: No edema.      Left lower leg: No edema.   Skin:     General: Skin is warm and dry.      Findings: No lesion or rash.   Neurological:      General: No focal deficit present.      Mental Status: She is alert.   Psychiatric:         Mood and Affect: Mood normal.               Significant Labs: All pertinent labs within the past 24 hours have been reviewed.    Significant Imaging: I have reviewed all pertinent imaging results/findings within the past 24 hours.      Assessment & Plan  Fracture of greater trochanter of right femur  -PT/OT   -Percocet for pain  -lovenox for DVT prophylaxis   Chronic hyponatremia  Hyponatremia is likely due to chronic alcohol abuse. The patient's most recent sodium results are listed below.  Recent Labs     03/12/25  0829 03/13/25  0518   * 136     Plan  - Monitor sodium Daily.   - Patient hyponatremia is improving    Alcoholism  Monitor chronic hyponatremia.     Hypertension  Patient's blood pressure range in the last 24 hours was: BP  Min: 115/68  Max: 130/76.The patient's inpatient anti-hypertensive regimen is listed below:  Current Antihypertensives  lisinopriL tablet 40 mg, Daily, Oral    Plan  - BP is controlled, no changes needed to their regimen    Major depression in remission  -home meds as reconciled.       Emphysema, unspecified  Patient's COPD is controlled currently.   Continue scheduled inhalers.  Drug-induced constipation  Secondary to pain medication.  -started miralax and docusate on admission.   -If no bowel movement within 24 hours, will order an enema.     VTE Risk  Mitigation (From admission, onward)           Ordered     enoxaparin injection 30 mg  Daily         03/12/25 1554                    Discharge Planning   RANJEET:      Code Status: Full Code   Medical Readiness for Discharge Date:   Discharge Plan A: Home, Home Health                Please place Justification for DME        Vish Resendez DO  Department of Hospital Medicine   [facility] - [facility]

## 2025-03-14 PROCEDURE — 99309 SBSQ NF CARE MODERATE MDM 30: CPT | Mod: ,,, | Performed by: FAMILY MEDICINE

## 2025-03-14 PROCEDURE — S4991 NICOTINE PATCH NONLEGEND: HCPCS | Performed by: FAMILY MEDICINE

## 2025-03-14 PROCEDURE — 94640 AIRWAY INHALATION TREATMENT: CPT

## 2025-03-14 PROCEDURE — 97535 SELF CARE MNGMENT TRAINING: CPT

## 2025-03-14 PROCEDURE — 99900035 HC TECH TIME PER 15 MIN (STAT)

## 2025-03-14 PROCEDURE — 97116 GAIT TRAINING THERAPY: CPT

## 2025-03-14 PROCEDURE — 27100098 HC SPACER

## 2025-03-14 PROCEDURE — 63600175 PHARM REV CODE 636 W HCPCS: Performed by: FAMILY MEDICINE

## 2025-03-14 PROCEDURE — 25000003 PHARM REV CODE 250: Performed by: FAMILY MEDICINE

## 2025-03-14 PROCEDURE — 11000004 HC SNF PRIVATE

## 2025-03-14 PROCEDURE — 94761 N-INVAS EAR/PLS OXIMETRY MLT: CPT

## 2025-03-14 RX ORDER — ACETAMINOPHEN 325 MG/1
650 TABLET ORAL EVERY 6 HOURS PRN
Status: DISCONTINUED | OUTPATIENT
Start: 2025-03-14 | End: 2025-03-17 | Stop reason: HOSPADM

## 2025-03-14 RX ADMIN — CALCIUM CARBONATE (ANTACID) CHEW TAB 500 MG 500 MG: 500 CHEW TAB at 08:03

## 2025-03-14 RX ADMIN — OXYCODONE HYDROCHLORIDE AND ACETAMINOPHEN 2 TABLET: 5; 325 TABLET ORAL at 01:03

## 2025-03-14 RX ADMIN — OXYCODONE HYDROCHLORIDE AND ACETAMINOPHEN 2 TABLET: 5; 325 TABLET ORAL at 05:03

## 2025-03-14 RX ADMIN — TIOTROPIUM BROMIDE INHALATION SPRAY 2 PUFF: 3.12 SPRAY, METERED RESPIRATORY (INHALATION) at 06:03

## 2025-03-14 RX ADMIN — LINACLOTIDE 145 MCG: 145 CAPSULE, GELATIN COATED ORAL at 09:03

## 2025-03-14 RX ADMIN — LISINOPRIL 40 MG: 20 TABLET ORAL at 09:03

## 2025-03-14 RX ADMIN — DOCUSATE SODIUM 100 MG: 100 CAPSULE, LIQUID FILLED ORAL at 09:03

## 2025-03-14 RX ADMIN — THIAMINE HCL TAB 100 MG 100 MG: 100 TAB at 09:03

## 2025-03-14 RX ADMIN — GABAPENTIN 300 MG: 300 CAPSULE ORAL at 04:03

## 2025-03-14 RX ADMIN — MUPIROCIN 1 G: 20 OINTMENT TOPICAL at 09:03

## 2025-03-14 RX ADMIN — ESCITALOPRAM OXALATE 10 MG: 10 TABLET ORAL at 09:03

## 2025-03-14 RX ADMIN — NICOTINE 1 PATCH: 21 PATCH, EXTENDED RELEASE TRANSDERMAL at 09:03

## 2025-03-14 RX ADMIN — GABAPENTIN 300 MG: 300 CAPSULE ORAL at 08:03

## 2025-03-14 RX ADMIN — ASPIRIN 81 MG: 81 TABLET, COATED ORAL at 09:03

## 2025-03-14 RX ADMIN — CETIRIZINE HYDROCHLORIDE 10 MG: 10 TABLET, FILM COATED ORAL at 09:03

## 2025-03-14 RX ADMIN — FLUTICASONE FUROATE AND VILANTEROL TRIFENATATE 1 PUFF: 200; 25 POWDER RESPIRATORY (INHALATION) at 06:03

## 2025-03-14 RX ADMIN — OXYCODONE HYDROCHLORIDE AND ACETAMINOPHEN 2 TABLET: 5; 325 TABLET ORAL at 09:03

## 2025-03-14 RX ADMIN — DOCUSATE SODIUM 100 MG: 100 CAPSULE, LIQUID FILLED ORAL at 08:03

## 2025-03-14 RX ADMIN — ATORVASTATIN CALCIUM 40 MG: 40 TABLET, FILM COATED ORAL at 09:03

## 2025-03-14 RX ADMIN — MUPIROCIN 1 G: 20 OINTMENT TOPICAL at 08:03

## 2025-03-14 RX ADMIN — POLYETHYLENE GLYCOL 3350 17 G: 17 POWDER, FOR SOLUTION ORAL at 09:03

## 2025-03-14 RX ADMIN — FOLIC ACID 1 MG: 1 TABLET ORAL at 09:03

## 2025-03-14 RX ADMIN — Medication 6 MG: at 08:03

## 2025-03-14 RX ADMIN — ENOXAPARIN SODIUM 30 MG: 30 INJECTION SUBCUTANEOUS at 09:03

## 2025-03-14 RX ADMIN — CALCIUM CARBONATE (ANTACID) CHEW TAB 500 MG 500 MG: 500 CHEW TAB at 09:03

## 2025-03-14 RX ADMIN — THERA TABS 1 TABLET: TAB at 09:03

## 2025-03-14 RX ADMIN — GABAPENTIN 300 MG: 300 CAPSULE ORAL at 09:03

## 2025-03-14 RX ADMIN — OXYCODONE HYDROCHLORIDE AND ACETAMINOPHEN 2 TABLET: 5; 325 TABLET ORAL at 08:03

## 2025-03-14 NOTE — PT/OT/SLP PROGRESS
Name: Andree Mcclelland    : 1960 (64 y.o.)  MRN: [id number]           Patient Not Seen      Returned to see pt at 13:05. Pt was requesting to mobilize to the restroom. Pt transitioned onto the toilet and reported the urge for a BM.  Pt requested to spend some time seated on the toilet.  Nsg aware.  Pt aware to utilize the call string.  Returned at 13:40, but pt had returned to bed. Pt was in good spirits after having a BM.  Pt pleasantly declined ambulation at this time.  Pt agreeable to ambulate with the staff later today.

## 2025-03-14 NOTE — PROGRESS NOTES
[facility]  [facility] Medicine  Progress Note    Patient Name: Andree Mcclelland  MRN: [id number]  Patient Class: IP- Swing   Admission Date: 3/12/2025  Length of Stay: 2 days  Attending Physician: Vish Resendez DO  Primary Care Provider: Vish Resendez DO        Subjective     Principal Problem:Fracture of greater trochanter of right femur        HPI:  Patient is a 64y F with history of chronic hyponatremia secondary to alcoholism. Right greater trochanter fracture, non-operable, admitted to swing bed for physical therapy and occupation therapy. Discharged from [facility] on 3/12/25. Patient reports no bowel movement for 4 days.     Overview/Hospital Course:  Doing well today. Still has not had a bowel movement despite miralax and stool softener. Pain controlled with oral medications. Continue PT/OT. Sodium stable.     3/14/25: Patient doing well. Reports pain as a 7-8/10. Constipation, last bowel movement on 3/8/25, though is passing flatus.         Review of Systems   Gastrointestinal:  Positive for constipation.   Musculoskeletal:  Positive for arthralgias.     Objective:     Vital Signs (Most Recent):  Temp: 99 °F (37.2 °C) (03/14/25 0706)  Pulse: 98 (03/14/25 0706)  Resp: 18 (03/14/25 0910)  BP: 107/67 (03/14/25 0706)  SpO2: (!) 93 % (03/14/25 0706) Vital Signs (24h Range):  Temp:  [98.9 °F (37.2 °C)-99 °F (37.2 °C)] 99 °F (37.2 °C)  Pulse:  [90-98] 98  Resp:  [18-20] 18  SpO2:  [93 %-99 %] 93 %  BP: (107-109)/(65-67) 107/67     Weight: 50.9 kg (112 lb 3.4 oz)  Body mass index is 19.26 kg/m².    Intake/Output Summary (Last 24 hours) at 3/14/2025 1226  Last data filed at 3/14/2025 0627  Gross per 24 hour   Intake 240 ml   Output --   Net 240 ml         Physical Exam  Vitals reviewed.   Constitutional:       General: She is not in acute distress.     Appearance: Normal appearance.   Cardiovascular:      Rate and Rhythm: Normal rate and regular rhythm.      Heart sounds: No murmur  heard.     No friction rub. No gallop.   Pulmonary:      Effort: No respiratory distress.      Breath sounds: No wheezing, rhonchi or rales.   Musculoskeletal:         General: No swelling, tenderness or deformity.      Right lower leg: No edema.      Left lower leg: No edema.   Skin:     General: Skin is warm and dry.      Findings: No lesion or rash.   Neurological:      General: No focal deficit present.      Mental Status: She is alert.   Psychiatric:         Mood and Affect: Mood normal.               Significant Labs: All pertinent labs within the past 24 hours have been reviewed.    Significant Imaging: I have reviewed all pertinent imaging results/findings within the past 24 hours.      Assessment & Plan  Fracture of greater trochanter of right femur  -PT/OT   -Percocet for pain  -lovenox for DVT prophylaxis   Chronic hyponatremia  Hyponatremia is likely due to chronic alcohol abuse. The patient's most recent sodium results are listed below.  Recent Labs     03/12/25  0829 03/13/25  0518   * 136     Plan  - Monitor sodium Daily.   - Patient hyponatremia is improving    Alcoholism  Monitor chronic hyponatremia.     Hypertension  Patient's blood pressure range in the last 24 hours was: BP  Min: 107/67  Max: 109/65.The patient's inpatient anti-hypertensive regimen is listed below:  Current Antihypertensives  lisinopriL tablet 40 mg, Daily, Oral    Plan  - BP is controlled, no changes needed to their regimen    Major depression in remission  -home meds as reconciled.       Emphysema, unspecified  Patient's COPD is controlled currently.   Continue scheduled inhalers.  Drug-induced constipation  Secondary to pain medication.  -started miralax and docusate on admission.   -If no bowel movement within 24 hours, will order an enema.     VTE Risk Mitigation (From admission, onward)           Ordered     enoxaparin injection 30 mg  Daily         03/12/25 2218                    Discharge Planning   RANJEET:      Code  Status: Full Code   Medical Readiness for Discharge Date:   Discharge Plan A: Home, Home Health                Please place Justification for DME        Vish Resendez DO  Department of Hospital Medicine   [facility]

## 2025-03-14 NOTE — PT/OT/SLP PROGRESS
"         Physical Therapy Treatment Note           Name: Andree Mcclelland    : 1960 (64 y.o.)  MRN: [id number]             Subjective Assessment:     No complaints  Lethargic   X Awake, alert, cooperative  Uncooperative    Agitated  c/o pain    Appropriate  c/o fatigue    Confused  Treated at bedside     Emotionally labile  Treated in gym/dept.    Impulsive  Other:    Flat affect       Pain/Comfort:    Pain Rating 1: 8/10  Location - Side 1: Right  Location 1: leg    Therapy Precautions:      Cognitive deficits  Spinal precautions    Collar - hard  Sternal precautions    Collar - soft   TLSO   X Fall risk  LSO    Hip precautions - posterior  Knee immobilizer    Hip precautions - anterior  WBAT    Impaired communication X Partial weightbearing RLE    Oxygen  TTWB    PEG tube  NWB    Visual deficits  Other:    Hearing deficits               Mobility Training:     Assist Level Assistive Device Comments    Bed Mobility Damaris  Sitting EOB to supine.  Bed was elevated to the height of pt's bed at home.     Sit to stand/Stand to sit Sup RW Sit to stand/stand to sit. Pt stood from recliner level and returned to sitting at the EOB.     Transfers      Gait SBA RW Pt able to ambulate 200 ft with a step through gait pattern and slow but steady gait speed. Pt was able to maintain PWB RLE without any complaints of increased pain.  Pt stated "I feel so much better walking this way".  No LOB observed.    Balance Training      Wheelchair Mobility      Stair Climbing      Car Transfer      Other:             Assessment:     PT received a call back from Dr. Figueroa's office.  Pt has been cleared for partial weight bearing RLE. Pt was educated regarding this change in her weight bearing status and expressed understanding. Pt was able to mobilize with increased safety and stated "I feel so much better, I won't fall this way". Pt will likely be ready for D/C home early next week.      GOALS:   Multidisciplinary Problems       Physical " Therapy Goals          Problem: Physical Therapy    Goal Priority Disciplines Outcome Interventions   Physical Therapy Goal     PT, PT/OT Progressing    Description: Patient will increase functional independence with mobility by performin. Sit to stand transfer with Modified [place]  2. Bed to chair transfer with Modified [place] using Rolling Walker  3. Gait  x 300 feet with Modified [place] using Rolling Walker.                            Discharge Recommendations:      Home with HH    Discharge Equipment Recommendations:     walker, rolling     At end of treatment, patient remained:      Up in chair    X In bed   X With alarm activated    Bed rails up   X Call bell in reach      Family/friends present     Restraints secured properly     In bathroom with CNA/RN notified     In gym with PT/PTA/tech     Nurse aware     Other:           PT Start Time: 904     PT Stop Time: 933  PT Total Time (min): 29 min     Billable Minutes: Gait Training 2025

## 2025-03-14 NOTE — PT/OT/SLP PROGRESS
Occupational Therapy  Treatment    Name: Andree Mcclelland    : 1960 (64 y.o.)  MRN: [id number]          TREATMENT SUMMARY AND RECOMMENDATIONS:      Subjective Assessment:   No complaints  Lethargic   x Awake, alert, cooperative  Uncooperative    Agitated  Flat affect   x Appropriate  c/o fatigue    Confused  Treated at bedside     Emotionally liable  Treated in gym/dept.    Impulsive x Other: Pt reported sleeping well and feeling better today.       Pain/Comfort:  Pain Rating 1: 4/10  Location - Side 1: Right  Location 1: hip  Pain Addressed 1: Pre-medicate for activity, Reposition, Distraction      Therapy Precautions:    Cognitive deficits  Spinal precautions    Collar - hard  Sternal precautions    Collar - soft   TLSO   x Fall risk  LSO    Hip precautions - posterior  Knee immobilizer    Hip precautions - anterior  WBAT    Impaired communication  Partial weightbearing    Oxygen  TTWB    PEG tube  NWB    Visual deficits x Other: RLE weight bearing increased to PWB, see PT note.    Hearing deficits           Vitals Value   x Room air      Oxygen (L)     Blood pressure     Heart rate         Treatment Objectives:     Mobility Training:    Mobility task Assist level Comments    Bed mobility Mod I Transfer training semi-supine to sit Mod I using bed rail.   Balance training Sup Dynamic standing bal/marycruz performed during ADL activities, Sup with 1 UE support, tolerated standing up to 1 min.   Transfer Sup transfer training bed>wc>TTB>wc>recliner Sup using RW with RLE PWB.   Functional mobility     Sit to stand transitions Sup    Other:       ADL Training:    ADL Assist level Comments    Feeding     Grooming/hygiene     Bathing Sup ADL bathing training performed with shower sitting on TTB using  shower.  After setup, pt able to bathe/dry 100% of body with distant Sup.   Upper body dressing Mod I Pt able to doff/oumar pull over gown Mod I   Lower body dressing SBA ADL LE dressing training performed sitting on TTB.  Pt  able to doff/don underwear and bilateral slipper socks SBA after setup   Toileting Mod I ADL toileting performed Mod I + urine.     Toilet transfer Mod I Pt able tot transfer on<>off toilet mod I with grab bar.   Adaptive equipment training     Other:         Additional Treatment:  Reviewed safety during functional transfers and mobility with RW following RLE PWB prec.  Discussed with pt the benefits of a TTB if it will fit in her large jacuzzi tub verses a shower chair in her small walk-in shower.  Reinforced call bel function and call before you fall.    Assessment: Patient tolerated session well.  Session focused on ADL shower/dressing activities.  Pt able to perform with distant supervision after setup.  PT Carlito had contacted Dr. Figueroa's office and was given approval to increase RLE wt bearing from TTWB to PWB and pt able to perform functional transfers Mod I.  Pt will be ready for discharge beginning next week.     OT Plan: OT to focus next session on transfer/ADL Training.      GOALS:   Multidisciplinary Problems       Occupational Therapy Goals          Problem: Occupational Therapy    Goal Priority Disciplines Outcome Interventions   Occupational Therapy Goal     OT, PT/OT Progressing    Description:   Patient will increase functional independence with ADLs by performing:    Toileting from toilet with Modified [place] for hygiene and clothing management.   Bathing from  shower chair/bench with Modified [place].  Toilet transfer to toilet with Modified [place].                         Communication with Treatment Team:     Discharge Recommendations:    Discharge Equipment Recommendations:  walker, rolling, shower chair  Barriers to discharge:  Decreased caregiver support      At end of treatment, patient remained:  x Up in chair     In bed   x With alarm activated    Bed rails up   x Call bell in reach     Family/friends present    Restraints secured properly    In bathroom with CNA/RN  notified    In gym with PT/PTA/tech   xx Nurse aware    Other:         OT Date of Treatment: 03/14/25  OT Start Time: 0820  OT Stop Time: 0855  OT Total Time (min): 35 min    Billable Minutes:Self Care/Home Management 35      3/14/2025

## 2025-03-14 NOTE — ASSESSMENT & PLAN NOTE
Patient's blood pressure range in the last 24 hours was: BP  Min: 107/67  Max: 109/65.The patient's inpatient anti-hypertensive regimen is listed below:  Current Antihypertensives  lisinopriL tablet 40 mg, Daily, Oral    Plan  - BP is controlled, no changes needed to their regimen

## 2025-03-14 NOTE — PLAN OF CARE
Problem: Adult Inpatient Plan of Care  Goal: Plan of Care Review  3/13/2025 2054 by Santos Hernandez RN  Outcome: Progressing  3/13/2025 2054 by Santos Hernandez RN  Outcome: Progressing  Goal: Patient-Specific Goal (Individualized)  3/13/2025 2054 by Santos Hernandez RN  Outcome: Progressing  3/13/2025 2054 by Santos Hernandez RN  Outcome: Progressing  Goal: Absence of Hospital-Acquired Illness or Injury  3/13/2025 2054 by Santos Hernandez RN  Outcome: Progressing  3/13/2025 2054 by Santos Hernandez RN  Outcome: Progressing  Goal: Optimal Comfort and Wellbeing  3/13/2025 2054 by Santos Hernandez RN  Outcome: Progressing  3/13/2025 2054 by Santos Hernandez RN  Outcome: Progressing  Goal: Readiness for Transition of Care  3/13/2025 2054 by Santos Hernandez RN  Outcome: Progressing  3/13/2025 2054 by Santos Hernandez RN  Outcome: Progressing     Problem: Fall Injury Risk  Goal: Absence of Fall and Fall-Related Injury  3/13/2025 2054 by Santos Hernandez RN  Outcome: Progressing  3/13/2025 2054 by Santos Hernandez RN  Outcome: Progressing     Problem: Gas Exchange Impaired  Goal: Optimal Gas Exchange  3/13/2025 2054 by Santos Hernandez RN  Outcome: Progressing  3/13/2025 2054 by Santos Hernandez RN  Outcome: Progressing     Problem: Pain Acute  Goal: Optimal Pain Control and Function  3/13/2025 2054 by Santos Hernandez RN  Outcome: Progressing  3/13/2025 2054 by Santos Hernandez RN  Outcome: Progressing     Problem: Excessive Substance Use  Goal: Optimized Energy Level (Excessive Substance Use)  3/13/2025 2054 by Santos Hernandez RN  Outcome: Progressing  3/13/2025 2054 by Santos Hernandez RN  Outcome: Progressing  Goal: Improved Behavioral Control (Excessive Substance Use)  3/13/2025 2054 by Santos Hernandez RN  Outcome: Progressing  3/13/2025 2054 by Santos Hernandez RN  Outcome: Progressing  Goal: Increased Participation and Engagement (Excessive Substance Use)  3/13/2025 2054 by Santos Hernandez RN  Outcome:  Progressing  3/13/2025 2054 by Santos Hernandez RN  Outcome: Progressing  Goal: Improved Physiologic Symptoms (Excessive Substance Use)  3/13/2025 2054 by Santos Hernandez RN  Outcome: Progressing  3/13/2025 2054 by Santos Hernandez RN  Outcome: Progressing  Goal: Enhanced Social, Occupational or Functional Skills (Excessive Substance Use)  3/13/2025 2054 by Santos Hernandez RN  Outcome: Progressing  3/13/2025 2054 by Santos Hernandez RN  Outcome: Progressing     Problem: Alcohol Withdrawal  Goal: Alcohol Withdrawal Symptom Control  3/13/2025 2054 by Santos Hernandez RN  Outcome: Progressing  3/13/2025 2054 by Santos Hernandez RN  Outcome: Progressing  Goal: Optimal Neurologic Function  3/13/2025 2054 by Santos Hernandez RN  Outcome: Progressing  3/13/2025 2054 by Santos Hernandez RN  Outcome: Progressing  Goal: Readiness for Change Identified  3/13/2025 2054 by Santos Hernandez RN  Outcome: Progressing  3/13/2025 2054 by Santos Hernandez RN  Outcome: Progressing     Problem: Comorbidity Management  Goal: Maintenance of Behavioral Health Symptom Control  3/13/2025 2054 by Santos Hernandez RN  Outcome: Progressing  3/13/2025 2054 by Santos Hernandez RN  Outcome: Progressing  Goal: Maintenance of COPD Symptom Control  3/13/2025 2054 by Santos Hernandez RN  Outcome: Progressing  3/13/2025 2054 by Santos Hernandez RN  Outcome: Progressing  Goal: Blood Pressure in Desired Range  3/13/2025 2054 by Santos Hernandez RN  Outcome: Progressing  3/13/2025 2054 by Santos Hernandez RN  Outcome: Progressing     Problem: Hip Fracture Medical Management  Goal: Optimal Coping with Change in Health Status  3/13/2025 2054 by Santos Hernandez RN  Outcome: Progressing  3/13/2025 2054 by Santos Hernandez RN  Outcome: Progressing  Goal: Absence of Bleeding  3/13/2025 2054 by Santos Hernandez RN  Outcome: Progressing  3/13/2025 2054 by Lollis, Santos, RN  Outcome: Progressing  Goal: Effective Bowel Elimination  3/13/2025 2054 by Mary,  JINNY Graham  Outcome: Progressing  3/13/2025 2054 by Santos Hernandez RN  Outcome: Progressing  Goal: Baseline Cognitive Function Maintained  3/13/2025 2054 by Santos Hernandez RN  Outcome: Progressing  3/13/2025 2054 by Santos Hernandez RN  Outcome: Progressing  Goal: Absence of Embolism  3/13/2025 2054 by Santos Hernandez, RN  Outcome: Progressing  3/13/2025 2054 by Santos Hernandez RN  Outcome: Progressing  Goal: Fracture Stability  3/13/2025 2054 by Santos Hernandez RN  Outcome: Progressing  3/13/2025 2054 by Santos Hernandez RN  Outcome: Progressing  Goal: Optimal Functional Performance  3/13/2025 2054 by Santos Hernandez RN  Outcome: Progressing  3/13/2025 2054 by Santos Hernandez RN  Outcome: Progressing  Goal: Pain Control and Function  3/13/2025 2054 by Santos Hernandez RN  Outcome: Progressing  3/13/2025 2054 by Santos Hernandez RN  Outcome: Progressing  Goal: Effective Urinary Elimination  3/13/2025 2054 by Santos Hernandez RN  Outcome: Progressing  3/13/2025 2054 by Santos Hernandez RN  Outcome: Progressing     Problem: Mobility Impairment  Goal: Optimal Mobility  3/13/2025 2054 by Santos Hernandez RN  Outcome: Progressing  3/13/2025 2054 by Santos Hernandez RN  Outcome: Progressing     Problem: Electrolyte Imbalance  Goal: Electrolyte Balance  3/13/2025 2054 by Santos Hernandez RN  Outcome: Progressing  3/13/2025 2054 by Santos Hernandez RN  Outcome: Progressing     Problem: COPD (Chronic Obstructive Pulmonary Disease)  Goal: Optimal Chronic Illness Coping  3/13/2025 2054 by Santos Hernandez RN  Outcome: Progressing  3/13/2025 2054 by Santos Hernandez RN  Outcome: Progressing  Goal: Optimal Level of Functional [place]  3/13/2025 2054 by Santos Hernandez RN  Outcome: Progressing  3/13/2025 2054 by Santos Hernandez RN  Outcome: Progressing  Goal: Absence of Infection Signs and Symptoms  3/13/2025 2054 by Santos Hernandez, RN  Outcome: Progressing  3/13/2025 2054 by Santos Hernandez, RN  Outcome:  Progressing  Goal: Improved Oral Intake  3/13/2025 2054 by Santos Hernandez RN  Outcome: Progressing  3/13/2025 2054 by Santos Hernandez RN  Outcome: Progressing  Goal: Effective Oxygenation and Ventilation  3/13/2025 2054 by Santos Hernandez RN  Outcome: Progressing  3/13/2025 2054 by Santos Hernandez RN  Outcome: Progressing     Problem: Depression  Goal: Improved Mood  3/13/2025 2054 by Santos Hernandez RN  Outcome: Progressing  3/13/2025 2054 by Santos Hernandez RN  Outcome: Progressing

## 2025-03-14 NOTE — ASSESSMENT & PLAN NOTE
Hyponatremia is likely due to chronic alcohol abuse. The patient's most recent sodium results are listed below.  Recent Labs     03/12/25  0829 03/13/25  0518   * 136     Plan  - Monitor sodium Daily.   - Patient hyponatremia is improving     Preparing for Your Surgery      Name:  Paul Loera   MRN:  [id number]   :  1995   Today's Date:  2018     Arriving for surgery: Left Wrist Excision of mass   Surgery date:  2018  Arrival time:  7:45 am  Please come to:     [facility]  [address] [place] [id number]     Parking is available in front of the Clinics and Surgery Center building from 5:30AM to 8:00PM.  -  Proceed to the 5th floor to check into the Ambulatory Surgery Center.              >> There will be patient concierges on the 1st and 5th floor, for assistance or an escort, if you would like.              >> Please call [phone] with any questions.    What can I eat or drink?  -  You may have solid food or milk products until 8 hours prior to your surgery.( Midnight)   -  You may have water, apple juice or 7up/Sprite until 2 hours prior to your surgery. (until 7 am )    Which medicines can I take?        Stop Aspirin, vitamins and supplements one week prior to surgery.      Hold Ibuprofen and Naproxen for 24 hours prior to surgery.       -  Please take these medications the day of surgery:      Cephalexin       How do I prepare myself?  -  Take two showers: one the night before surgery; and one the morning of surgery.         Use Scrubcare or Hibiclens to wash from neck down.  You may use your own shampoo and conditioner. No other hair products.   -  Do NOT use lotion, powder, deodorant, or antiperspirant the day of your surgery.  -  Do NOT wear any makeup, fingernail polish or jewelry  - Do not bring your own medications to the hospital, except for inhalers and eye drops.  -  Bring your ID and insurance card.    Questions or Concerns:    -If you are scheduled at the Ambulatory Surgery Center please call [phone].    -For questions after surgery please call your surgeons office.

## 2025-03-14 NOTE — SUBJECTIVE & OBJECTIVE
Review of Systems   Gastrointestinal:  Positive for constipation.   Musculoskeletal:  Positive for arthralgias.     Objective:     Vital Signs (Most Recent):  Temp: 99 °F (37.2 °C) (03/14/25 0706)  Pulse: 98 (03/14/25 0706)  Resp: 18 (03/14/25 0910)  BP: 107/67 (03/14/25 0706)  SpO2: (!) 93 % (03/14/25 0706) Vital Signs (24h Range):  Temp:  [98.9 °F (37.2 °C)-99 °F (37.2 °C)] 99 °F (37.2 °C)  Pulse:  [90-98] 98  Resp:  [18-20] 18  SpO2:  [93 %-99 %] 93 %  BP: (107-109)/(65-67) 107/67     Weight: 50.9 kg (112 lb 3.4 oz)  Body mass index is 19.26 kg/m².    Intake/Output Summary (Last 24 hours) at 3/14/2025 1226  Last data filed at 3/14/2025 0627  Gross per 24 hour   Intake 240 ml   Output --   Net 240 ml         Physical Exam  Vitals reviewed.   Constitutional:       General: She is not in acute distress.     Appearance: Normal appearance.   Cardiovascular:      Rate and Rhythm: Normal rate and regular rhythm.      Heart sounds: No murmur heard.     No friction rub. No gallop.   Pulmonary:      Effort: No respiratory distress.      Breath sounds: No wheezing, rhonchi or rales.   Musculoskeletal:         General: No swelling, tenderness or deformity.      Right lower leg: No edema.      Left lower leg: No edema.   Skin:     General: Skin is warm and dry.      Findings: No lesion or rash.   Neurological:      General: No focal deficit present.      Mental Status: She is alert.   Psychiatric:         Mood and Affect: Mood normal.               Significant Labs: All pertinent labs within the past 24 hours have been reviewed.    Significant Imaging: I have reviewed all pertinent imaging results/findings within the past 24 hours.

## 2025-03-15 LAB
ANION GAP SERPL CALC-SCNC: 7 MEQ/L
BASOPHILS # BLD AUTO: 0.02 X10(3)/MCL
BASOPHILS NFR BLD AUTO: 0.2 %
BUN SERPL-MCNC: 19 MG/DL (ref 9.8–20.1)
CALCIUM SERPL-MCNC: 9.7 MG/DL (ref 8.4–10.2)
CHLORIDE SERPL-SCNC: 99 MMOL/L (ref 98–107)
CO2 SERPL-SCNC: 27 MMOL/L (ref 23–31)
CREAT SERPL-MCNC: 0.81 MG/DL (ref 0.55–1.02)
CREAT/UREA NIT SERPL: 23
EOSINOPHIL # BLD AUTO: 0.17 X10(3)/MCL (ref 0–0.9)
EOSINOPHIL NFR BLD AUTO: 1.9 %
ERYTHROCYTE [DISTWIDTH] IN BLOOD BY AUTOMATED COUNT: 15 % (ref 11.5–17)
GFR SERPLBLD CREATININE-BSD FMLA CKD-EPI: >60 ML/MIN/1.73/M2
GLUCOSE SERPL-MCNC: 138 MG/DL (ref 82–115)
HCT VFR BLD AUTO: 29.4 % (ref 37–47)
HGB BLD-MCNC: 9.8 G/DL (ref 12–16)
IMM GRANULOCYTES # BLD AUTO: 0.04 X10(3)/MCL (ref 0–0.04)
IMM GRANULOCYTES NFR BLD AUTO: 0.4 %
LYMPHOCYTES # BLD AUTO: 2.32 X10(3)/MCL (ref 0.6–4.6)
LYMPHOCYTES NFR BLD AUTO: 25.9 %
MCH RBC QN AUTO: 32.8 PG (ref 27–31)
MCHC RBC AUTO-ENTMCNC: 33.3 G/DL (ref 33–36)
MCV RBC AUTO: 98.3 FL (ref 80–94)
MONOCYTES # BLD AUTO: 0.82 X10(3)/MCL (ref 0.1–1.3)
MONOCYTES NFR BLD AUTO: 9.2 %
NEUTROPHILS # BLD AUTO: 5.59 X10(3)/MCL (ref 2.1–9.2)
NEUTROPHILS NFR BLD AUTO: 62.4 %
NRBC BLD AUTO-RTO: 0 %
PLATELET # BLD AUTO: 258 X10(3)/MCL (ref 130–400)
PMV BLD AUTO: 9.5 FL (ref 7.4–10.4)
POTASSIUM SERPL-SCNC: 4.9 MMOL/L (ref 3.5–5.1)
RBC # BLD AUTO: 2.99 X10(6)/MCL (ref 4.2–5.4)
SODIUM SERPL-SCNC: 133 MMOL/L (ref 136–145)
WBC # BLD AUTO: 8.96 X10(3)/MCL (ref 4.5–11.5)

## 2025-03-15 PROCEDURE — 99900035 HC TECH TIME PER 15 MIN (STAT)

## 2025-03-15 PROCEDURE — 97116 GAIT TRAINING THERAPY: CPT

## 2025-03-15 PROCEDURE — 85025 COMPLETE CBC W/AUTO DIFF WBC: CPT | Performed by: FAMILY MEDICINE

## 2025-03-15 PROCEDURE — 80048 BASIC METABOLIC PNL TOTAL CA: CPT | Performed by: FAMILY MEDICINE

## 2025-03-15 PROCEDURE — 94640 AIRWAY INHALATION TREATMENT: CPT

## 2025-03-15 PROCEDURE — 25000003 PHARM REV CODE 250: Performed by: FAMILY MEDICINE

## 2025-03-15 PROCEDURE — 97530 THERAPEUTIC ACTIVITIES: CPT

## 2025-03-15 PROCEDURE — S4991 NICOTINE PATCH NONLEGEND: HCPCS | Performed by: FAMILY MEDICINE

## 2025-03-15 PROCEDURE — 11000004 HC SNF PRIVATE

## 2025-03-15 PROCEDURE — 63600175 PHARM REV CODE 636 W HCPCS: Performed by: FAMILY MEDICINE

## 2025-03-15 PROCEDURE — 27100098 HC SPACER

## 2025-03-15 PROCEDURE — 94761 N-INVAS EAR/PLS OXIMETRY MLT: CPT

## 2025-03-15 PROCEDURE — 36415 COLL VENOUS BLD VENIPUNCTURE: CPT | Performed by: FAMILY MEDICINE

## 2025-03-15 RX ADMIN — ASPIRIN 81 MG: 81 TABLET, COATED ORAL at 08:03

## 2025-03-15 RX ADMIN — NICOTINE 1 PATCH: 21 PATCH, EXTENDED RELEASE TRANSDERMAL at 08:03

## 2025-03-15 RX ADMIN — GABAPENTIN 300 MG: 300 CAPSULE ORAL at 03:03

## 2025-03-15 RX ADMIN — DOCUSATE SODIUM 100 MG: 100 CAPSULE, LIQUID FILLED ORAL at 08:03

## 2025-03-15 RX ADMIN — CALCIUM CARBONATE (ANTACID) CHEW TAB 500 MG 500 MG: 500 CHEW TAB at 08:03

## 2025-03-15 RX ADMIN — OXYCODONE HYDROCHLORIDE AND ACETAMINOPHEN 2 TABLET: 5; 325 TABLET ORAL at 07:03

## 2025-03-15 RX ADMIN — TIOTROPIUM BROMIDE INHALATION SPRAY 2 PUFF: 3.12 SPRAY, METERED RESPIRATORY (INHALATION) at 06:03

## 2025-03-15 RX ADMIN — FOLIC ACID 1 MG: 1 TABLET ORAL at 08:03

## 2025-03-15 RX ADMIN — LISINOPRIL 40 MG: 20 TABLET ORAL at 08:03

## 2025-03-15 RX ADMIN — OXYCODONE HYDROCHLORIDE AND ACETAMINOPHEN 2 TABLET: 5; 325 TABLET ORAL at 06:03

## 2025-03-15 RX ADMIN — CETIRIZINE HYDROCHLORIDE 10 MG: 10 TABLET, FILM COATED ORAL at 08:03

## 2025-03-15 RX ADMIN — GABAPENTIN 300 MG: 300 CAPSULE ORAL at 08:03

## 2025-03-15 RX ADMIN — OXYCODONE HYDROCHLORIDE AND ACETAMINOPHEN 2 TABLET: 5; 325 TABLET ORAL at 10:03

## 2025-03-15 RX ADMIN — FLUTICASONE FUROATE AND VILANTEROL TRIFENATATE 1 PUFF: 200; 25 POWDER RESPIRATORY (INHALATION) at 06:03

## 2025-03-15 RX ADMIN — MUPIROCIN 1 G: 20 OINTMENT TOPICAL at 08:03

## 2025-03-15 RX ADMIN — ATORVASTATIN CALCIUM 40 MG: 40 TABLET, FILM COATED ORAL at 08:03

## 2025-03-15 RX ADMIN — ENOXAPARIN SODIUM 30 MG: 30 INJECTION SUBCUTANEOUS at 08:03

## 2025-03-15 RX ADMIN — THIAMINE HCL TAB 100 MG 100 MG: 100 TAB at 08:03

## 2025-03-15 RX ADMIN — OXYCODONE HYDROCHLORIDE AND ACETAMINOPHEN 2 TABLET: 5; 325 TABLET ORAL at 03:03

## 2025-03-15 RX ADMIN — Medication 6 MG: at 08:03

## 2025-03-15 RX ADMIN — POLYETHYLENE GLYCOL 3350 17 G: 17 POWDER, FOR SOLUTION ORAL at 08:03

## 2025-03-15 RX ADMIN — THERA TABS 1 TABLET: TAB at 08:03

## 2025-03-15 RX ADMIN — OXYCODONE HYDROCHLORIDE AND ACETAMINOPHEN 2 TABLET: 5; 325 TABLET ORAL at 01:03

## 2025-03-15 RX ADMIN — ESCITALOPRAM OXALATE 10 MG: 10 TABLET ORAL at 08:03

## 2025-03-15 NOTE — SUBJECTIVE & OBJECTIVE
Review of Systems   Constitutional:  Positive for activity change. Negative for appetite change and fever.   Respiratory:  Negative for chest tightness and shortness of breath.    Cardiovascular:  Negative for chest pain and leg swelling.   Gastrointestinal:  Negative for abdominal distention, abdominal pain, constipation, diarrhea, nausea and vomiting.   Musculoskeletal:  Positive for arthralgias.   Skin:  Positive for wound. Negative for rash.     Objective:     Vital Signs (Most Recent):  Temp: 98.2 °F (36.8 °C) (03/15/25 0746)  Pulse: 96 (03/15/25 0746)  Resp: 18 (03/15/25 1318)  BP: 112/68 (03/15/25 0746)  SpO2: 95 % (03/15/25 0746) Vital Signs (24h Range):  Temp:  [98 °F (36.7 °C)-98.2 °F (36.8 °C)] 98.2 °F (36.8 °C)  Pulse:  [65-96] 96  Resp:  [16-22] 18  SpO2:  [94 %-96 %] 95 %  BP: ()/(59-68) 112/68     Weight: 50.9 kg (112 lb 3.4 oz)  Body mass index is 19.26 kg/m².  No intake or output data in the 24 hours ending 03/15/25 1353      Physical Exam  Vitals reviewed.   Constitutional:       General: She is not in acute distress.     Appearance: Normal appearance.   HENT:      Head: Normocephalic and atraumatic.      Nose: Nose normal.   Eyes:      Conjunctiva/sclera: Conjunctivae normal.   Cardiovascular:      Rate and Rhythm: Normal rate and regular rhythm.      Pulses: Normal pulses.      Heart sounds: Normal heart sounds. No murmur heard.     No gallop.   Pulmonary:      Effort: Pulmonary effort is normal.      Breath sounds: Normal breath sounds. No wheezing, rhonchi or rales.   Abdominal:      General: Bowel sounds are normal. There is no distension.      Palpations: Abdomen is soft.      Tenderness: There is no abdominal tenderness. There is no guarding.   Musculoskeletal:         General: No deformity.      Cervical back: Normal range of motion and neck supple.      Right lower leg: No edema.      Left lower leg: No edema.   Skin:     General: Skin is warm and dry.   Neurological:      Mental  Status: She is alert and oriented to person, place, and time. Mental status is at baseline.      Gait: Gait abnormal.   Psychiatric:         Mood and Affect: Mood normal.               Significant Labs: All pertinent labs within the past 24 hours have been reviewed.  Recent Lab Results         03/15/25  0421        Anion Gap 7.0       Baso # 0.02       Basophil % 0.2       BUN 19.0       BUN/CREAT RATIO 23       Calcium 9.7       Chloride 99       CO2 27       Creatinine 0.81       eGFR >60  Comment: Estimated GFR calculated using the CKD-EPI creatinine (2021) equation.       Eos # 0.17       Eos % 1.9       Glucose 138       Hematocrit 29.4       Hemoglobin 9.8       Immature Grans (Abs) 0.04       Immature Granulocytes 0.4       Lymph # 2.32       LYMPH % 25.9       MCH 32.8       MCHC 33.3       MCV 98.3       Mono # 0.82       Mono % 9.2       MPV 9.5       Neut # 5.59       Neut % 62.4       nRBC 0.0       Platelet Count 258       Potassium 4.9       RBC 2.99       RDW 15.0       Sodium 133       WBC 8.96               Significant Imaging: I have reviewed all pertinent imaging results/findings within the past 24 hours.

## 2025-03-15 NOTE — PLAN OF CARE
Problem: Adult Inpatient Plan of Care  Goal: Plan of Care Review  Outcome: Progressing  Goal: Patient-Specific Goal (Individualized)  Outcome: Progressing  Goal: Absence of Hospital-Acquired Illness or Injury  Outcome: Progressing  Goal: Optimal Comfort and Wellbeing  Outcome: Progressing  Goal: Readiness for Transition of Care  Outcome: Progressing     Problem: Fall Injury Risk  Goal: Absence of Fall and Fall-Related Injury  Outcome: Progressing     Problem: Gas Exchange Impaired  Goal: Optimal Gas Exchange  Outcome: Progressing     Problem: Pain Acute  Goal: Optimal Pain Control and Function  Outcome: Progressing     Problem: Excessive Substance Use  Goal: Optimized Energy Level (Excessive Substance Use)  Outcome: Progressing  Goal: Improved Behavioral Control (Excessive Substance Use)  Outcome: Progressing  Goal: Increased Participation and Engagement (Excessive Substance Use)  Outcome: Progressing  Goal: Improved Physiologic Symptoms (Excessive Substance Use)  Outcome: Progressing  Goal: Enhanced Social, Occupational or Functional Skills (Excessive Substance Use)  Outcome: Progressing     Problem: Alcohol Withdrawal  Goal: Alcohol Withdrawal Symptom Control  Outcome: Progressing  Goal: Optimal Neurologic Function  Outcome: Progressing  Goal: Readiness for Change Identified  Outcome: Progressing     Problem: Comorbidity Management  Goal: Maintenance of Behavioral Health Symptom Control  Outcome: Progressing  Goal: Maintenance of COPD Symptom Control  Outcome: Progressing  Goal: Blood Pressure in Desired Range  Outcome: Progressing     Problem: Hip Fracture Medical Management  Goal: Optimal Coping with Change in Health Status  Outcome: Progressing  Goal: Absence of Bleeding  Outcome: Progressing  Goal: Effective Bowel Elimination  Outcome: Progressing  Goal: Baseline Cognitive Function Maintained  Outcome: Progressing  Goal: Absence of Embolism  Outcome: Progressing  Goal: Fracture Stability  Outcome:  Progressing  Goal: Optimal Functional Performance  Outcome: Progressing  Goal: Pain Control and Function  Outcome: Progressing  Goal: Effective Urinary Elimination  Outcome: Progressing     Problem: Mobility Impairment  Goal: Optimal Mobility  Outcome: Progressing     Problem: Electrolyte Imbalance  Goal: Electrolyte Balance  Outcome: Progressing     Problem: COPD (Chronic Obstructive Pulmonary Disease)  Goal: Optimal Chronic Illness Coping  Outcome: Progressing  Goal: Optimal Level of Functional [place]  Outcome: Progressing  Goal: Absence of Infection Signs and Symptoms  Outcome: Progressing  Goal: Improved Oral Intake  Outcome: Progressing  Goal: Effective Oxygenation and Ventilation  Outcome: Progressing     Problem: Depression  Goal: Improved Mood  Outcome: Progressing

## 2025-03-15 NOTE — PT/OT/SLP PROGRESS
Physical Therapy Treatment Note           Name: Andree Mcclelland    : 1960 (64 y.o.)  MRN: [id number]             Subjective Assessment:     No complaints  Lethargic   x Awake, alert, cooperative  Uncooperative    Agitated x c/o pain   x Appropriate  c/o fatigue    Confused  Treated at bedside     Emotionally labile  Treated in gym/dept.    Impulsive  Other:    Flat affect       Pain/Comfort:    Pain Rating 1: 6/10  Location - Side 1: Right  Location 1: hip  Pain Addressed 1: Pre-medicate for activity, Reposition    Therapy Precautions:      Cognitive deficits  Spinal precautions    Collar - hard  Sternal precautions    Collar - soft   TLSO   x Fall risk  LSO    Hip precautions - posterior  Knee immobilizer    Hip precautions - anterior  WBAT    Impaired communication  Partial weightbearing    Oxygen  TTWB    PEG tube  NWB    Visual deficits x Other:  PWB right LE    Hearing deficits        Vital Signs:     Vitals Value    Room air      Oxygen (L)     Blood pressure     Heart rate          Mobility Training:     Assist level Comments    Bed mobility SBA  Sit to supine , supine to sit SBA    Sit to stand/stand to sit SBA  Verbal cues for set up and to maintain PWB on right.    Transfers SBA Bed to toilet with RW verbal cues for PWB   Gait SBA 2 x 150 feet with minimal cues to remind patient of PWB to right LE.   Balance training     Wheelchair mobility     Car transfer     Other:          Therapeutic Exercise:     Exercise Sets Reps Comments                               Additional Treatment:        Assessment:     Patient tolerated session good .    PT Plan:     Continue with current POC    GOALS:   Multidisciplinary Problems       Physical Therapy Goals          Problem: Physical Therapy    Goal Priority Disciplines Outcome Interventions   Physical Therapy Goal     PT, PT/OT Progressing    Description: Patient will increase functional independence with mobility by performin. Sit to stand transfer  with Modified [place]  2. Bed to chair transfer with Modified [place] using Rolling Walker  3. Gait  x 300 feet with Modified [place] using Rolling Walker.                            Discharge Recommendations:      Home with HH        Discharge Equipment Recommendations:     walker, rolling     At end of treatment, patient remained:      Up in chair    x In bed   x With alarm activated   x Bed rails up   x Call bell in reach      Family/friends present     Restraints secured properly     In bathroom with CNA/RN notified     In gym with PT/PTA/tech   x  Nurse aware     Other:           PT Start Time: 0835     PT Stop Time: 0910  PT Total Time (min): 35 min     Billable Minutes: Gait Training 20 and Therapeutic Activity 15      03/15/2025

## 2025-03-15 NOTE — PROGRESS NOTES
[facility] - Medical Surgical Unit  [facility] Medicine  Progress Note    Patient Name: Andree Mcclelland  MRN: [id number]  Patient Class: IP- Swing   Admission Date: 3/12/2025  Length of Stay: 3 days  Attending Physician: Vish Resendez DO  Primary Care Provider: Vish Resendez DO        Subjective     Principal Problem:Fracture of greater trochanter of right femur        HPI:  Patient is a 64y F with history of chronic hyponatremia secondary to alcoholism. Right greater trochanter fracture, non-operable, admitted to swing bed for physical therapy and occupation therapy. Discharged from [facility] on 3/12/25. Patient reports no bowel movement for 4 days.     Overview/Hospital Course:  Doing well today. Still has not had a bowel movement despite miralax and stool softener. Pain controlled with oral medications. Continue PT/OT. Sodium stable.     3/14/25: Patient doing well. Reports pain as a 7-8/10. Constipation, last bowel movement on 3/8/25, though is passing flatus.     3/15/25: Pt states she is having hip pain. But appears comfortable. She is lying in bed watching tv. Denies any sob, cp, abd pain. Had a bm.       Review of Systems   Constitutional:  Positive for activity change. Negative for appetite change and fever.   Respiratory:  Negative for chest tightness and shortness of breath.    Cardiovascular:  Negative for chest pain and leg swelling.   Gastrointestinal:  Negative for abdominal distention, abdominal pain, constipation, diarrhea, nausea and vomiting.   Musculoskeletal:  Positive for arthralgias.   Skin:  Positive for wound. Negative for rash.     Objective:     Vital Signs (Most Recent):  Temp: 98.2 °F (36.8 °C) (03/15/25 0746)  Pulse: 96 (03/15/25 0746)  Resp: 18 (03/15/25 1318)  BP: 112/68 (03/15/25 0746)  SpO2: 95 % (03/15/25 0746) Vital Signs (24h Range):  Temp:  [98 °F (36.7 °C)-98.2 °F (36.8 °C)] 98.2 °F (36.8 °C)  Pulse:  [65-96] 96  Resp:  [16-22] 18  SpO2:  [94 %-96 %] 95 %  BP:  ()/(59-68) 112/68     Weight: 50.9 kg (112 lb 3.4 oz)  Body mass index is 19.26 kg/m².  No intake or output data in the 24 hours ending 03/15/25 1353      Physical Exam  Vitals reviewed.   Constitutional:       General: She is not in acute distress.     Appearance: Normal appearance.   HENT:      Head: Normocephalic and atraumatic.      Nose: Nose normal.   Eyes:      Conjunctiva/sclera: Conjunctivae normal.   Cardiovascular:      Rate and Rhythm: Normal rate and regular rhythm.      Pulses: Normal pulses.      Heart sounds: Normal heart sounds. No murmur heard.     No gallop.   Pulmonary:      Effort: Pulmonary effort is normal.      Breath sounds: Normal breath sounds. No wheezing, rhonchi or rales.   Abdominal:      General: Bowel sounds are normal. There is no distension.      Palpations: Abdomen is soft.      Tenderness: There is no abdominal tenderness. There is no guarding.   Musculoskeletal:         General: No deformity.      Cervical back: Normal range of motion and neck supple.      Right lower leg: No edema.      Left lower leg: No edema.   Skin:     General: Skin is warm and dry.   Neurological:      Mental Status: She is alert and oriented to person, place, and time. Mental status is at baseline.      Gait: Gait abnormal.   Psychiatric:         Mood and Affect: Mood normal.               Significant Labs: All pertinent labs within the past 24 hours have been reviewed.  Recent Lab Results         03/15/25  0421        Anion Gap 7.0       Baso # 0.02       Basophil % 0.2       BUN 19.0       BUN/CREAT RATIO 23       Calcium 9.7       Chloride 99       CO2 27       Creatinine 0.81       eGFR >60  Comment: Estimated GFR calculated using the CKD-EPI creatinine (2021) equation.       Eos # 0.17       Eos % 1.9       Glucose 138       Hematocrit 29.4       Hemoglobin 9.8       Immature Grans (Abs) 0.04       Immature Granulocytes 0.4       Lymph # 2.32       LYMPH % 25.9       MCH 32.8       MCHC 33.3        MCV 98.3       Mono # 0.82       Mono % 9.2       MPV 9.5       Neut # 5.59       Neut % 62.4       nRBC 0.0       Platelet Count 258       Potassium 4.9       RBC 2.99       RDW 15.0       Sodium 133       WBC 8.96               Significant Imaging: I have reviewed all pertinent imaging results/findings within the past 24 hours.      Assessment & Plan  Fracture of greater trochanter of right femur  -PT/OT   -Percocet for pain  -lovenox for DVT prophylaxis   Chronic hyponatremia  Hyponatremia is likely due to chronic alcohol abuse. The patient's most recent sodium results are listed below.  Recent Labs     03/13/25  0518 03/15/25  0421    133*     Plan  - Monitor sodium Daily.   - Patient hyponatremia is improving    Alcoholism  Monitor chronic hyponatremia.     Hypertension  Patient's blood pressure range in the last 24 hours was: BP  Min: 96/59  Max: 112/68.The patient's inpatient anti-hypertensive regimen is listed below:  Current Antihypertensives  lisinopriL tablet 40 mg, Daily, Oral    Plan  - BP is controlled, no changes needed to their regimen    Major depression in remission  -home meds as reconciled.       Emphysema, unspecified  Patient's COPD is controlled currently.   Continue scheduled inhalers.  Drug-induced constipation  Secondary to pain medication.  -started miralax and docusate on admission.   -If no bowel movement within 24 hours, will order an enema.     VTE Risk Mitigation (From admission, onward)           Ordered     enoxaparin injection 30 mg  Daily         03/12/25 1551                    Discharge Planning   RANJEET:      Code Status: Full Code   Medical Readiness for Discharge Date:   Discharge Plan A: Home, Home Health                Please place Justification for DME        AIDEN NAVARRO DO  Department of Hospital Medicine   [facility]

## 2025-03-15 NOTE — ASSESSMENT & PLAN NOTE
Hyponatremia is likely due to chronic alcohol abuse. The patient's most recent sodium results are listed below.  Recent Labs     03/13/25  0518 03/15/25  0421    133*     Plan  - Monitor sodium Daily.   - Patient hyponatremia is improving

## 2025-03-15 NOTE — PLAN OF CARE
Problem: Adult Inpatient Plan of Care  Goal: Plan of Care Review  Outcome: Progressing  Goal: Patient-Specific Goal (Individualized)  Outcome: Progressing  Goal: Absence of Hospital-Acquired Illness or Injury  Outcome: Progressing  Goal: Optimal Comfort and Wellbeing  Outcome: Progressing  Goal: Readiness for Transition of Care  Outcome: Progressing     Problem: Fall Injury Risk  Goal: Absence of Fall and Fall-Related Injury  Outcome: Progressing     Problem: Gas Exchange Impaired  Goal: Optimal Gas Exchange  Outcome: Progressing     Problem: Pain Acute  Goal: Optimal Pain Control and Function  Outcome: Progressing     Problem: Excessive Substance Use  Goal: Optimized Energy Level (Excessive Substance Use)  Outcome: Progressing  Goal: Improved Behavioral Control (Excessive Substance Use)  Outcome: Progressing  Goal: Increased Participation and Engagement (Excessive Substance Use)  Outcome: Progressing  Goal: Improved Physiologic Symptoms (Excessive Substance Use)  Outcome: Progressing  Goal: Enhanced Social, Occupational or Functional Skills (Excessive Substance Use)  Outcome: Progressing     Problem: Alcohol Withdrawal  Goal: Alcohol Withdrawal Symptom Control  Outcome: Progressing  Goal: Optimal Neurologic Function  Outcome: Progressing  Goal: Readiness for Change Identified  Outcome: Progressing     Problem: Comorbidity Management  Goal: Maintenance of Behavioral Health Symptom Control  Outcome: Progressing  Goal: Maintenance of COPD Symptom Control  Outcome: Progressing  Goal: Blood Pressure in Desired Range  Outcome: Progressing     Problem: Hip Fracture Medical Management  Goal: Optimal Coping with Change in Health Status  Outcome: Progressing  Goal: Absence of Bleeding  Outcome: Progressing  Goal: Effective Bowel Elimination  Outcome: Progressing  Goal: Baseline Cognitive Function Maintained  Outcome: Progressing  Goal: Absence of Embolism  Outcome: Progressing  Goal: Fracture Stability  Outcome:  Progressing  Goal: Optimal Functional Performance  Outcome: Progressing  Goal: Pain Control and Function  Outcome: Progressing  Goal: Effective Urinary Elimination  Outcome: Progressing     Problem: Mobility Impairment  Goal: Optimal Mobility  Outcome: Progressing     Problem: Electrolyte Imbalance  Goal: Electrolyte Balance  Outcome: Progressing     Problem: COPD (Chronic Obstructive Pulmonary Disease)  Goal: Optimal Chronic Illness Coping  Outcome: Progressing  Goal: Optimal Level of Functional [place]  Outcome: Progressing  Goal: Absence of Infection Signs and Symptoms  Outcome: Progressing  Goal: Improved Oral Intake  Outcome: Progressing  Goal: Effective Oxygenation and Ventilation  Outcome: Progressing     Problem: Depression  Goal: Improved Mood  Outcome: Progressing

## 2025-03-15 NOTE — ASSESSMENT & PLAN NOTE
Patient's blood pressure range in the last 24 hours was: BP  Min: 96/59  Max: 112/68.The patient's inpatient anti-hypertensive regimen is listed below:  Current Antihypertensives  lisinopriL tablet 40 mg, Daily, Oral    Plan  - BP is controlled, no changes needed to their regimen

## 2025-03-16 PROCEDURE — 25000003 PHARM REV CODE 250: Performed by: FAMILY MEDICINE

## 2025-03-16 PROCEDURE — 99900035 HC TECH TIME PER 15 MIN (STAT)

## 2025-03-16 PROCEDURE — S4991 NICOTINE PATCH NONLEGEND: HCPCS | Performed by: FAMILY MEDICINE

## 2025-03-16 PROCEDURE — 11000004 HC SNF PRIVATE

## 2025-03-16 PROCEDURE — 94761 N-INVAS EAR/PLS OXIMETRY MLT: CPT

## 2025-03-16 PROCEDURE — 94640 AIRWAY INHALATION TREATMENT: CPT

## 2025-03-16 PROCEDURE — 63600175 PHARM REV CODE 636 W HCPCS: Performed by: FAMILY MEDICINE

## 2025-03-16 RX ADMIN — TIOTROPIUM BROMIDE INHALATION SPRAY 2 PUFF: 3.12 SPRAY, METERED RESPIRATORY (INHALATION) at 06:03

## 2025-03-16 RX ADMIN — ENOXAPARIN SODIUM 30 MG: 30 INJECTION SUBCUTANEOUS at 08:03

## 2025-03-16 RX ADMIN — MUPIROCIN 1 G: 20 OINTMENT TOPICAL at 08:03

## 2025-03-16 RX ADMIN — GABAPENTIN 300 MG: 300 CAPSULE ORAL at 03:03

## 2025-03-16 RX ADMIN — CALCIUM CARBONATE (ANTACID) CHEW TAB 500 MG 500 MG: 500 CHEW TAB at 08:03

## 2025-03-16 RX ADMIN — ATORVASTATIN CALCIUM 40 MG: 40 TABLET, FILM COATED ORAL at 08:03

## 2025-03-16 RX ADMIN — NICOTINE 1 PATCH: 21 PATCH, EXTENDED RELEASE TRANSDERMAL at 08:03

## 2025-03-16 RX ADMIN — DOCUSATE SODIUM 100 MG: 100 CAPSULE, LIQUID FILLED ORAL at 08:03

## 2025-03-16 RX ADMIN — LISINOPRIL 40 MG: 20 TABLET ORAL at 08:03

## 2025-03-16 RX ADMIN — GABAPENTIN 300 MG: 300 CAPSULE ORAL at 08:03

## 2025-03-16 RX ADMIN — THIAMINE HCL TAB 100 MG 100 MG: 100 TAB at 08:03

## 2025-03-16 RX ADMIN — OXYCODONE HYDROCHLORIDE AND ACETAMINOPHEN 2 TABLET: 5; 325 TABLET ORAL at 03:03

## 2025-03-16 RX ADMIN — ESCITALOPRAM OXALATE 10 MG: 10 TABLET ORAL at 08:03

## 2025-03-16 RX ADMIN — OXYCODONE HYDROCHLORIDE AND ACETAMINOPHEN 2 TABLET: 5; 325 TABLET ORAL at 06:03

## 2025-03-16 RX ADMIN — MUPIROCIN 2 G: 20 OINTMENT TOPICAL at 08:03

## 2025-03-16 RX ADMIN — Medication 6 MG: at 08:03

## 2025-03-16 RX ADMIN — FLUTICASONE FUROATE AND VILANTEROL TRIFENATATE 1 PUFF: 200; 25 POWDER RESPIRATORY (INHALATION) at 06:03

## 2025-03-16 RX ADMIN — THERA TABS 1 TABLET: TAB at 08:03

## 2025-03-16 RX ADMIN — POLYETHYLENE GLYCOL 3350 17 G: 17 POWDER, FOR SOLUTION ORAL at 08:03

## 2025-03-16 RX ADMIN — FOLIC ACID 1 MG: 1 TABLET ORAL at 08:03

## 2025-03-16 RX ADMIN — ASPIRIN 81 MG: 81 TABLET, COATED ORAL at 08:03

## 2025-03-16 RX ADMIN — OXYCODONE HYDROCHLORIDE AND ACETAMINOPHEN 2 TABLET: 5; 325 TABLET ORAL at 10:03

## 2025-03-16 RX ADMIN — CETIRIZINE HYDROCHLORIDE 10 MG: 10 TABLET, FILM COATED ORAL at 08:03

## 2025-03-16 RX ADMIN — OXYCODONE HYDROCHLORIDE AND ACETAMINOPHEN 2 TABLET: 5; 325 TABLET ORAL at 08:03

## 2025-03-16 NOTE — PROGRESS NOTES
[facility]  [facility] Medicine  Progress Note    Patient Name: Andree Mcclelland  MRN: [id number]  Patient Class: IP- Swing   Admission Date: 3/12/2025  Length of Stay: 4 days  Attending Physician: Debra Salinas DO  Primary Care Provider: Vish Resendez DO        Subjective     Principal Problem:Fracture of greater trochanter of right femur        HPI:  Patient is a 64y F with history of chronic hyponatremia secondary to alcoholism. Right greater trochanter fracture, non-operable, admitted to swing bed for physical therapy and occupation therapy. Discharged from [facility] on 3/12/25. Patient reports no bowel movement for 4 days.     Overview/Hospital Course:  Doing well today. Still has not had a bowel movement despite miralax and stool softener. Pain controlled with oral medications. Continue PT/OT. Sodium stable.     3/14/25: Patient doing well. Reports pain as a 7-8/10. Constipation, last bowel movement on 3/8/25, though is passing flatus.     3/15/25: Pt states she is having hip pain. But appears comfortable. She is lying in bed watching tv. Denies any sob, cp, abd pain. Had a bm.     3/16/25: Pt is lying in bed comfortably watching tv. She states that she is due for her pain medication. Denies any sob,cp, abd pain.      Review of Systems   Constitutional:  Positive for activity change. Negative for appetite change and fever.   Respiratory:  Negative for chest tightness and shortness of breath.    Cardiovascular:  Negative for chest pain and leg swelling.   Gastrointestinal:  Negative for abdominal distention, abdominal pain, constipation, diarrhea, nausea and vomiting.   Musculoskeletal:  Positive for arthralgias.   Skin:  Positive for wound. Negative for rash.     Objective:     Vital Signs (Most Recent):  Temp: 98.5 °F (36.9 °C) (03/16/25 0810)  Pulse: 100 (03/16/25 0810)  Resp: 18 (03/16/25 1058)  BP: 95/70 (03/16/25 0810)  SpO2: (!) 93 % (03/16/25 0810) Vital Signs (24h  Range):  Temp:  [97.7 °F (36.5 °C)-98.5 °F (36.9 °C)] 98.5 °F (36.9 °C)  Pulse:  [] 100  Resp:  [16-22] 18  SpO2:  [93 %-97 %] 93 %  BP: ()/(70-71) 95/70     Weight: 49 kg (108 lb 0.4 oz)  Body mass index is 18.54 kg/m².    Intake/Output Summary (Last 24 hours) at 3/16/2025 1247  Last data filed at 3/16/2025 0626  Gross per 24 hour   Intake 240 ml   Output --   Net 240 ml         Physical Exam  Vitals reviewed.   Constitutional:       General: She is not in acute distress.     Appearance: Normal appearance.   HENT:      Head: Normocephalic and atraumatic.      Nose: Nose normal.   Eyes:      Conjunctiva/sclera: Conjunctivae normal.   Cardiovascular:      Rate and Rhythm: Normal rate and regular rhythm.      Pulses: Normal pulses.      Heart sounds: Normal heart sounds. No murmur heard.     No gallop.   Pulmonary:      Effort: Pulmonary effort is normal.      Breath sounds: Normal breath sounds. No wheezing, rhonchi or rales.   Abdominal:      General: Bowel sounds are normal. There is no distension.      Palpations: Abdomen is soft.      Tenderness: There is no abdominal tenderness. There is no guarding.   Musculoskeletal:         General: No deformity.      Cervical back: Normal range of motion and neck supple.      Right lower leg: No edema.      Left lower leg: No edema.   Skin:     General: Skin is warm and dry.   Neurological:      Mental Status: She is alert and oriented to person, place, and time. Mental status is at baseline.      Gait: Gait abnormal.   Psychiatric:         Mood and Affect: Mood normal.               Significant Labs: All pertinent labs within the past 24 hours have been reviewed.  Recent Lab Results       None            Significant Imaging: I have reviewed all pertinent imaging results/findings within the past 24 hours.      Assessment & Plan  Fracture of greater trochanter of right femur  -PT/OT   -Percocet for pain  -lovenox for DVT prophylaxis   Chronic  hyponatremia  Hyponatremia is likely due to chronic alcohol abuse. The patient's most recent sodium results are listed below.  Recent Labs     03/15/25  0421   *     Plan  - Monitor sodium Daily.   - Patient hyponatremia is improving    Alcoholism  Monitor chronic hyponatremia.     Hypertension  Patient's blood pressure range in the last 24 hours was: BP  Min: 95/70  Max: 115/71.The patient's inpatient anti-hypertensive regimen is listed below:  Current Antihypertensives  lisinopriL tablet 40 mg, Daily, Oral    Plan  - BP is controlled, no changes needed to their regimen    Major depression in remission  -home meds as reconciled.       Emphysema, unspecified  Patient's COPD is controlled currently.   Continue scheduled inhalers.  Drug-induced constipation  Secondary to pain medication.  -started miralax and docusate on admission.   -If no bowel movement within 24 hours, will order an enema.       VTE Risk Mitigation (From admission, onward)           Ordered     enoxaparin injection 30 mg  Daily         03/12/25 1551                    Discharge Planning   RANJEET:      Code Status: Full Code   Medical Readiness for Discharge Date:   Discharge Plan A: Home, Home Health                Please place Justification for DME        AIDEN NAVARRO DO  Department of Hospital Medicine   [facility]

## 2025-03-16 NOTE — PLAN OF CARE
Problem: Adult Inpatient Plan of Care  Goal: Plan of Care Review  Outcome: Progressing  Goal: Patient-Specific Goal (Individualized)  Outcome: Progressing  Goal: Absence of Hospital-Acquired Illness or Injury  Outcome: Progressing  Goal: Optimal Comfort and Wellbeing  Outcome: Progressing  Goal: Readiness for Transition of Care  Outcome: Progressing     Problem: Fall Injury Risk  Goal: Absence of Fall and Fall-Related Injury  Outcome: Progressing     Problem: Gas Exchange Impaired  Goal: Optimal Gas Exchange  Outcome: Progressing     Problem: Pain Acute  Goal: Optimal Pain Control and Function  Outcome: Progressing     Problem: Excessive Substance Use  Goal: Optimized Energy Level (Excessive Substance Use)  Outcome: Progressing  Goal: Improved Behavioral Control (Excessive Substance Use)  Outcome: Progressing  Goal: Increased Participation and Engagement (Excessive Substance Use)  Outcome: Progressing  Goal: Improved Physiologic Symptoms (Excessive Substance Use)  Outcome: Progressing  Goal: Enhanced Social, Occupational or Functional Skills (Excessive Substance Use)  Outcome: Progressing     Problem: Alcohol Withdrawal  Goal: Alcohol Withdrawal Symptom Control  Outcome: Progressing  Goal: Optimal Neurologic Function  Outcome: Progressing  Goal: Readiness for Change Identified  Outcome: Progressing     Problem: Comorbidity Management  Goal: Maintenance of Behavioral Health Symptom Control  Outcome: Progressing  Goal: Maintenance of COPD Symptom Control  Outcome: Progressing  Goal: Blood Pressure in Desired Range  Outcome: Progressing     Problem: Hip Fracture Medical Management  Goal: Optimal Coping with Change in Health Status  Outcome: Progressing  Goal: Absence of Bleeding  Outcome: Progressing  Goal: Effective Bowel Elimination  Outcome: Progressing  Goal: Baseline Cognitive Function Maintained  Outcome: Progressing  Goal: Absence of Embolism  Outcome: Progressing  Goal: Fracture Stability  Outcome:  Progressing  Goal: Optimal Functional Performance  Outcome: Progressing  Goal: Pain Control and Function  Outcome: Progressing  Goal: Effective Urinary Elimination  Outcome: Progressing     Problem: Mobility Impairment  Goal: Optimal Mobility  Outcome: Progressing     Problem: Electrolyte Imbalance  Goal: Electrolyte Balance  Outcome: Progressing     Problem: COPD (Chronic Obstructive Pulmonary Disease)  Goal: Optimal Chronic Illness Coping  Outcome: Progressing  Goal: Optimal Level of Functional [place]  Outcome: Progressing  Goal: Absence of Infection Signs and Symptoms  Outcome: Progressing  Goal: Improved Oral Intake  Outcome: Progressing  Goal: Effective Oxygenation and Ventilation  Outcome: Progressing     Problem: Depression  Goal: Improved Mood  Outcome: Progressing

## 2025-03-16 NOTE — PLAN OF CARE
Problem: Adult Inpatient Plan of Care  Goal: Plan of Care Review  Outcome: Progressing  Goal: Patient-Specific Goal (Individualized)  Outcome: Progressing  Goal: Absence of Hospital-Acquired Illness or Injury  Outcome: Progressing  Goal: Optimal Comfort and Wellbeing  Outcome: Progressing  Goal: Readiness for Transition of Care  Outcome: Progressing     Problem: Fall Injury Risk  Goal: Absence of Fall and Fall-Related Injury  Outcome: Progressing     Problem: Gas Exchange Impaired  Goal: Optimal Gas Exchange  Outcome: Progressing     Problem: Pain Acute  Goal: Optimal Pain Control and Function  Outcome: Progressing     Problem: Excessive Substance Use  Goal: Optimized Energy Level (Excessive Substance Use)  Outcome: Progressing  Goal: Improved Behavioral Control (Excessive Substance Use)  Outcome: Progressing  Goal: Increased Participation and Engagement (Excessive Substance Use)  Outcome: Progressing  Goal: Improved Physiologic Symptoms (Excessive Substance Use)  Outcome: Progressing  Goal: Enhanced Social, Occupational or Functional Skills (Excessive Substance Use)  Outcome: Progressing     Problem: Alcohol Withdrawal  Goal: Alcohol Withdrawal Symptom Control  Outcome: Progressing  Goal: Optimal Neurologic Function  Outcome: Progressing  Goal: Readiness for Change Identified  Outcome: Progressing     Problem: Comorbidity Management  Goal: Maintenance of Behavioral Health Symptom Control  Outcome: Progressing  Goal: Maintenance of COPD Symptom Control  Outcome: Progressing  Goal: Blood Pressure in Desired Range  Outcome: Progressing     Problem: Comorbidity Management  Goal: Maintenance of Behavioral Health Symptom Control  Outcome: Progressing  Goal: Maintenance of COPD Symptom Control  Outcome: Progressing  Goal: Blood Pressure in Desired Range  Outcome: Progressing     Problem: Hip Fracture Medical Management  Goal: Optimal Coping with Change in Health Status  Outcome: Progressing  Goal: Absence of  Bleeding  Outcome: Progressing  Goal: Effective Bowel Elimination  Outcome: Progressing  Goal: Baseline Cognitive Function Maintained  Outcome: Progressing  Goal: Absence of Embolism  Outcome: Progressing  Goal: Fracture Stability  Outcome: Progressing  Goal: Optimal Functional Performance  Outcome: Progressing  Goal: Pain Control and Function  Outcome: Progressing  Goal: Effective Urinary Elimination  Outcome: Progressing     Problem: Mobility Impairment  Goal: Optimal Mobility  Outcome: Progressing     Problem: Electrolyte Imbalance  Goal: Electrolyte Balance  Outcome: Progressing     Problem: COPD (Chronic Obstructive Pulmonary Disease)  Goal: Optimal Chronic Illness Coping  Outcome: Progressing  Goal: Optimal Level of Functional [place]  Outcome: Progressing  Goal: Absence of Infection Signs and Symptoms  Outcome: Progressing  Goal: Improved Oral Intake  Outcome: Progressing  Goal: Effective Oxygenation and Ventilation  Outcome: Progressing     Problem: Depression  Goal: Improved Mood  Outcome: Progressing

## 2025-03-16 NOTE — ASSESSMENT & PLAN NOTE
Patient's blood pressure range in the last 24 hours was: BP  Min: 95/70  Max: 115/71.The patient's inpatient anti-hypertensive regimen is listed below:  Current Antihypertensives  lisinopriL tablet 40 mg, Daily, Oral    Plan  - BP is controlled, no changes needed to their regimen

## 2025-03-16 NOTE — SUBJECTIVE & OBJECTIVE
Review of Systems   Constitutional:  Positive for activity change. Negative for appetite change and fever.   Respiratory:  Negative for chest tightness and shortness of breath.    Cardiovascular:  Negative for chest pain and leg swelling.   Gastrointestinal:  Negative for abdominal distention, abdominal pain, constipation, diarrhea, nausea and vomiting.   Musculoskeletal:  Positive for arthralgias.   Skin:  Positive for wound. Negative for rash.     Objective:     Vital Signs (Most Recent):  Temp: 98.5 °F (36.9 °C) (03/16/25 0810)  Pulse: 100 (03/16/25 0810)  Resp: 18 (03/16/25 1058)  BP: 95/70 (03/16/25 0810)  SpO2: (!) 93 % (03/16/25 0810) Vital Signs (24h Range):  Temp:  [97.7 °F (36.5 °C)-98.5 °F (36.9 °C)] 98.5 °F (36.9 °C)  Pulse:  [] 100  Resp:  [16-22] 18  SpO2:  [93 %-97 %] 93 %  BP: ()/(70-71) 95/70     Weight: 49 kg (108 lb 0.4 oz)  Body mass index is 18.54 kg/m².    Intake/Output Summary (Last 24 hours) at 3/16/2025 1247  Last data filed at 3/16/2025 0626  Gross per 24 hour   Intake 240 ml   Output --   Net 240 ml         Physical Exam  Vitals reviewed.   Constitutional:       General: She is not in acute distress.     Appearance: Normal appearance.   HENT:      Head: Normocephalic and atraumatic.      Nose: Nose normal.   Eyes:      Conjunctiva/sclera: Conjunctivae normal.   Cardiovascular:      Rate and Rhythm: Normal rate and regular rhythm.      Pulses: Normal pulses.      Heart sounds: Normal heart sounds. No murmur heard.     No gallop.   Pulmonary:      Effort: Pulmonary effort is normal.      Breath sounds: Normal breath sounds. No wheezing, rhonchi or rales.   Abdominal:      General: Bowel sounds are normal. There is no distension.      Palpations: Abdomen is soft.      Tenderness: There is no abdominal tenderness. There is no guarding.   Musculoskeletal:         General: No deformity.      Cervical back: Normal range of motion and neck supple.      Right lower leg: No edema.       Left lower leg: No edema.   Skin:     General: Skin is warm and dry.   Neurological:      Mental Status: She is alert and oriented to person, place, and time. Mental status is at baseline.      Gait: Gait abnormal.   Psychiatric:         Mood and Affect: Mood normal.               Significant Labs: All pertinent labs within the past 24 hours have been reviewed.  Recent Lab Results       None            Significant Imaging: I have reviewed all pertinent imaging results/findings within the past 24 hours.

## 2025-03-16 NOTE — ASSESSMENT & PLAN NOTE
Hyponatremia is likely due to chronic alcohol abuse. The patient's most recent sodium results are listed below.  Recent Labs     03/15/25  0421   *     Plan  - Monitor sodium Daily.   - Patient hyponatremia is improving

## 2025-03-17 VITALS
RESPIRATION RATE: 20 BRPM | HEIGHT: 64 IN | TEMPERATURE: 98 F | DIASTOLIC BLOOD PRESSURE: 60 MMHG | OXYGEN SATURATION: 95 % | BODY MASS INDEX: 18.44 KG/M2 | WEIGHT: 108 LBS | HEART RATE: 84 BPM | SYSTOLIC BLOOD PRESSURE: 121 MMHG

## 2025-03-17 PROBLEM — K59.03 DRUG-INDUCED CONSTIPATION: Status: RESOLVED | Noted: 2025-03-13 | Resolved: 2025-03-17

## 2025-03-17 LAB
ALBUMIN SERPL-MCNC: 3.2 G/DL (ref 3.4–4.8)
ALBUMIN/GLOB SERPL: 0.9 RATIO (ref 1.1–2)
ALP SERPL-CCNC: 93 UNIT/L (ref 40–150)
ALT SERPL-CCNC: 16 UNIT/L (ref 0–55)
ANION GAP SERPL CALC-SCNC: 4 MEQ/L
AST SERPL-CCNC: 17 UNIT/L (ref 5–34)
BASOPHILS # BLD AUTO: 0.03 X10(3)/MCL
BASOPHILS NFR BLD AUTO: 0.3 %
BILIRUB SERPL-MCNC: 0.2 MG/DL
BUN SERPL-MCNC: 17 MG/DL (ref 9.8–20.1)
CALCIUM SERPL-MCNC: 9.9 MG/DL (ref 8.4–10.2)
CHLORIDE SERPL-SCNC: 102 MMOL/L (ref 98–107)
CO2 SERPL-SCNC: 30 MMOL/L (ref 23–31)
CREAT SERPL-MCNC: 0.88 MG/DL (ref 0.55–1.02)
CREAT/UREA NIT SERPL: 19
EOSINOPHIL # BLD AUTO: 0.12 X10(3)/MCL (ref 0–0.9)
EOSINOPHIL NFR BLD AUTO: 1.4 %
ERYTHROCYTE [DISTWIDTH] IN BLOOD BY AUTOMATED COUNT: 14.8 % (ref 11.5–17)
GFR SERPLBLD CREATININE-BSD FMLA CKD-EPI: >60 ML/MIN/1.73/M2
GLOBULIN SER-MCNC: 3.5 GM/DL (ref 2.4–3.5)
GLUCOSE SERPL-MCNC: 114 MG/DL (ref 82–115)
HCT VFR BLD AUTO: 30.2 % (ref 37–47)
HGB BLD-MCNC: 9.9 G/DL (ref 12–16)
IMM GRANULOCYTES # BLD AUTO: 0.03 X10(3)/MCL (ref 0–0.04)
IMM GRANULOCYTES NFR BLD AUTO: 0.3 %
LYMPHOCYTES # BLD AUTO: 2.73 X10(3)/MCL (ref 0.6–4.6)
LYMPHOCYTES NFR BLD AUTO: 31.8 %
MCH RBC QN AUTO: 32.6 PG (ref 27–31)
MCHC RBC AUTO-ENTMCNC: 32.8 G/DL (ref 33–36)
MCV RBC AUTO: 99.3 FL (ref 80–94)
MONOCYTES # BLD AUTO: 1.35 X10(3)/MCL (ref 0.1–1.3)
MONOCYTES NFR BLD AUTO: 15.7 %
NEUTROPHILS # BLD AUTO: 4.33 X10(3)/MCL (ref 2.1–9.2)
NEUTROPHILS NFR BLD AUTO: 50.5 %
NRBC BLD AUTO-RTO: 0 %
PLATELET # BLD AUTO: 338 X10(3)/MCL (ref 130–400)
PMV BLD AUTO: 9.4 FL (ref 7.4–10.4)
POTASSIUM SERPL-SCNC: 5.1 MMOL/L (ref 3.5–5.1)
PROT SERPL-MCNC: 6.7 GM/DL (ref 5.8–7.6)
RBC # BLD AUTO: 3.04 X10(6)/MCL (ref 4.2–5.4)
SODIUM SERPL-SCNC: 136 MMOL/L (ref 136–145)
WBC # BLD AUTO: 8.59 X10(3)/MCL (ref 4.5–11.5)

## 2025-03-17 PROCEDURE — 99316 NF DSCHRG MGMT 30 MIN+: CPT | Mod: ,,, | Performed by: FAMILY MEDICINE

## 2025-03-17 PROCEDURE — 99900035 HC TECH TIME PER 15 MIN (STAT)

## 2025-03-17 PROCEDURE — 97116 GAIT TRAINING THERAPY: CPT

## 2025-03-17 PROCEDURE — 97530 THERAPEUTIC ACTIVITIES: CPT

## 2025-03-17 PROCEDURE — 36415 COLL VENOUS BLD VENIPUNCTURE: CPT | Performed by: INTERNAL MEDICINE

## 2025-03-17 PROCEDURE — 94640 AIRWAY INHALATION TREATMENT: CPT

## 2025-03-17 PROCEDURE — 80053 COMPREHEN METABOLIC PANEL: CPT | Performed by: INTERNAL MEDICINE

## 2025-03-17 PROCEDURE — 94761 N-INVAS EAR/PLS OXIMETRY MLT: CPT

## 2025-03-17 PROCEDURE — 25000003 PHARM REV CODE 250: Performed by: FAMILY MEDICINE

## 2025-03-17 PROCEDURE — 85025 COMPLETE CBC W/AUTO DIFF WBC: CPT | Performed by: FAMILY MEDICINE

## 2025-03-17 PROCEDURE — 63600175 PHARM REV CODE 636 W HCPCS: Performed by: FAMILY MEDICINE

## 2025-03-17 PROCEDURE — S4991 NICOTINE PATCH NONLEGEND: HCPCS | Performed by: FAMILY MEDICINE

## 2025-03-17 RX ORDER — OXYCODONE AND ACETAMINOPHEN 5; 325 MG/1; MG/1
1 TABLET ORAL EVERY 6 HOURS PRN
Qty: 28 TABLET | Refills: 0 | Status: SHIPPED | OUTPATIENT
Start: 2025-03-17 | End: 2025-03-24

## 2025-03-17 RX ORDER — LANOLIN ALCOHOL/MO/W.PET/CERES
100 CREAM (GRAM) TOPICAL DAILY
Qty: 30 TABLET | Refills: 11 | Status: SHIPPED | OUTPATIENT
Start: 2025-03-18

## 2025-03-17 RX ORDER — FOLIC ACID 1 MG/1
1 TABLET ORAL DAILY
Qty: 30 TABLET | Refills: 11 | Status: SHIPPED | OUTPATIENT
Start: 2025-03-18 | End: 2026-03-18

## 2025-03-17 RX ORDER — POLYETHYLENE GLYCOL 3350 17 G/17G
17 POWDER, FOR SOLUTION ORAL DAILY PRN
Qty: 30 EACH | Refills: 0 | Status: SHIPPED | OUTPATIENT
Start: 2025-03-17

## 2025-03-17 RX ORDER — DOCUSATE SODIUM 100 MG/1
100 CAPSULE, LIQUID FILLED ORAL 2 TIMES DAILY
Qty: 60 CAPSULE | Refills: 0 | Status: SHIPPED | OUTPATIENT
Start: 2025-03-17

## 2025-03-17 RX ADMIN — ESCITALOPRAM OXALATE 10 MG: 10 TABLET ORAL at 08:03

## 2025-03-17 RX ADMIN — DOCUSATE SODIUM 100 MG: 100 CAPSULE, LIQUID FILLED ORAL at 08:03

## 2025-03-17 RX ADMIN — OXYCODONE HYDROCHLORIDE AND ACETAMINOPHEN 2 TABLET: 5; 325 TABLET ORAL at 08:03

## 2025-03-17 RX ADMIN — CALCIUM CARBONATE (ANTACID) CHEW TAB 500 MG 500 MG: 500 CHEW TAB at 08:03

## 2025-03-17 RX ADMIN — ASPIRIN 81 MG: 81 TABLET, COATED ORAL at 08:03

## 2025-03-17 RX ADMIN — FOLIC ACID 1 MG: 1 TABLET ORAL at 08:03

## 2025-03-17 RX ADMIN — THIAMINE HCL TAB 100 MG 100 MG: 100 TAB at 08:03

## 2025-03-17 RX ADMIN — GABAPENTIN 300 MG: 300 CAPSULE ORAL at 08:03

## 2025-03-17 RX ADMIN — TIOTROPIUM BROMIDE INHALATION SPRAY 2 PUFF: 3.12 SPRAY, METERED RESPIRATORY (INHALATION) at 05:03

## 2025-03-17 RX ADMIN — OXYCODONE HYDROCHLORIDE AND ACETAMINOPHEN 2 TABLET: 5; 325 TABLET ORAL at 04:03

## 2025-03-17 RX ADMIN — THERA TABS 1 TABLET: TAB at 08:03

## 2025-03-17 RX ADMIN — POLYETHYLENE GLYCOL 3350 17 G: 17 POWDER, FOR SOLUTION ORAL at 08:03

## 2025-03-17 RX ADMIN — ENOXAPARIN SODIUM 30 MG: 30 INJECTION SUBCUTANEOUS at 08:03

## 2025-03-17 RX ADMIN — LINACLOTIDE 145 MCG: 145 CAPSULE, GELATIN COATED ORAL at 04:03

## 2025-03-17 RX ADMIN — ATORVASTATIN CALCIUM 40 MG: 40 TABLET, FILM COATED ORAL at 08:03

## 2025-03-17 RX ADMIN — OXYCODONE HYDROCHLORIDE AND ACETAMINOPHEN 2 TABLET: 5; 325 TABLET ORAL at 12:03

## 2025-03-17 RX ADMIN — LISINOPRIL 40 MG: 20 TABLET ORAL at 08:03

## 2025-03-17 RX ADMIN — FLUTICASONE FUROATE AND VILANTEROL TRIFENATATE 1 PUFF: 200; 25 POWDER RESPIRATORY (INHALATION) at 05:03

## 2025-03-17 RX ADMIN — CETIRIZINE HYDROCHLORIDE 10 MG: 10 TABLET, FILM COATED ORAL at 08:03

## 2025-03-17 RX ADMIN — NICOTINE 1 PATCH: 21 PATCH, EXTENDED RELEASE TRANSDERMAL at 08:03

## 2025-03-17 RX ADMIN — MUPIROCIN 1 G: 20 OINTMENT TOPICAL at 08:03

## 2025-03-17 NOTE — PLAN OF CARE
Problem: Adult Inpatient Plan of Care  Goal: Plan of Care Review  Outcome: Met  Goal: Patient-Specific Goal (Individualized)  Outcome: Met  Goal: Absence of Hospital-Acquired Illness or Injury  Outcome: Met  Goal: Optimal Comfort and Wellbeing  Outcome: Met  Goal: Readiness for Transition of Care  Outcome: Met     Problem: Fall Injury Risk  Goal: Absence of Fall and Fall-Related Injury  Outcome: Met     Problem: Gas Exchange Impaired  Goal: Optimal Gas Exchange  Outcome: Met     Problem: Pain Acute  Goal: Optimal Pain Control and Function  Outcome: Met     Problem: Excessive Substance Use  Goal: Optimized Energy Level (Excessive Substance Use)  Outcome: Met  Goal: Improved Behavioral Control (Excessive Substance Use)  Outcome: Met  Goal: Increased Participation and Engagement (Excessive Substance Use)  Outcome: Met  Goal: Improved Physiologic Symptoms (Excessive Substance Use)  Outcome: Met  Goal: Enhanced Social, Occupational or Functional Skills (Excessive Substance Use)  Outcome: Met     Problem: Alcohol Withdrawal  Goal: Alcohol Withdrawal Symptom Control  Outcome: Met  Goal: Optimal Neurologic Function  Outcome: Met  Goal: Readiness for Change Identified  Outcome: Met     Problem: Comorbidity Management  Goal: Maintenance of Behavioral Health Symptom Control  Outcome: Met  Goal: Maintenance of COPD Symptom Control  Outcome: Met  Goal: Blood Pressure in Desired Range  Outcome: Met     Problem: Hip Fracture Medical Management  Goal: Optimal Coping with Change in Health Status  Outcome: Met  Goal: Absence of Bleeding  Outcome: Met  Goal: Effective Bowel Elimination  Outcome: Met  Goal: Baseline Cognitive Function Maintained  Outcome: Met  Goal: Absence of Embolism  Outcome: Met  Goal: Fracture Stability  Outcome: Met  Goal: Optimal Functional Performance  Outcome: Met  Goal: Pain Control and Function  Outcome: Met  Goal: Effective Urinary Elimination  Outcome: Met     Problem: Mobility Impairment  Goal: Optimal  Mobility  Outcome: Met     Problem: Electrolyte Imbalance  Goal: Electrolyte Balance  Outcome: Met     Problem: COPD (Chronic Obstructive Pulmonary Disease)  Goal: Optimal Chronic Illness Coping  Outcome: Met  Goal: Optimal Level of Functional [place]  Outcome: Met  Goal: Absence of Infection Signs and Symptoms  Outcome: Met  Goal: Improved Oral Intake  Outcome: Met  Goal: Effective Oxygenation and Ventilation  Outcome: Met     Problem: Depression  Goal: Improved Mood  Outcome: Met

## 2025-03-17 NOTE — ASSESSMENT & PLAN NOTE
Patient's blood pressure range in the last 24 hours was: BP  Min: 121/60  Max: 123/77.The patient's inpatient anti-hypertensive regimen is listed below:  Current Antihypertensives  lisinopriL tablet 40 mg, Daily, Oral    Plan  - BP is controlled, no changes needed to their regimen

## 2025-03-17 NOTE — PLAN OF CARE
03/17/25 1259   Final Note   Assessment Type Final Discharge Note   Anticipated Discharge Disposition Home-Health   What phone number can be called within the next 1-3 days to see how you are doing after discharge? [phone]   Hospital Resources/Appts/Education Provided Provided education on problems/symptoms using teachback   Post-Acute Status   Post-Acute Authorization Home Health;HME   HME Status Set-up Complete/Auth obtained   Home Health Status Set-up Complete/Auth obtained   Discharge Delays None known at this time     Discharged to home with [facility]. Rolling walker will be delivered to home, [employer] was out of stock.

## 2025-03-17 NOTE — DISCHARGE INSTRUCTIONS
Pt to DC home with [facility] and RW.     Keep all follow up appts as scheduled.    Take all medications as prescribed.    Weight bearing as tolerated to right lower extremity.    Use rolling walker to ambulate.    Do not drive when taking narcotic pain medication.    Call your primary care for increased, uncontrollable pain.

## 2025-03-17 NOTE — ASSESSMENT & PLAN NOTE
Hyponatremia is likely due to chronic alcohol abuse. The patient's most recent sodium results are listed below.  Recent Labs     03/15/25  0421 03/17/25  0442   * 136     Plan  - Monitor sodium Daily.   - Patient hyponatremia is improving

## 2025-03-17 NOTE — PT/OT/SLP PROGRESS
"         Physical Therapy Treatment Note           Name: Andree Mcclelland    : 1960 (64 y.o.)  MRN: [id number]             Subjective Assessment:     No complaints  Lethargic   X Awake, alert, cooperative  Uncooperative    Agitated  c/o pain    Appropriate  c/o fatigue    Confused  Treated at bedside     Emotionally labile  Treated in gym/dept.    Impulsive  Other:    Flat affect       Pain/Comfort:    Location - Side 1: Right  Location 1: leg    Therapy Precautions:      Cognitive deficits  Spinal precautions    Collar - hard  Sternal precautions    Collar - soft   TLSO   X Fall risk  LSO    Hip precautions - posterior  Knee immobilizer    Hip precautions - anterior  WBAT    Impaired communication X Partial weightbearing RLE    Oxygen  TTWB    PEG tube  NWB    Visual deficits  Other:    Hearing deficits               Mobility Training:     Assist Level Assistive Device Comments    Bed Mobility [place]  Semi-supine to sitting at EOB, EOB to supine.     Sit to stand/Stand to sit [place] RW Sit to stand/stand to sit performed from EOB   Transfers      Gait Sup RW Pt able to ambulate >250 ft with a step through gait pattern and slow gait speed. Pt able to maintain PWB RLE with use of the RW.  No LOB observed.  Pt did demonstrate some decreased safety awareness when navigating a crowd upon entering the therapy gym.  Pt ran her RW into a few objects, but experienced no LOB.   Balance Training      Wheelchair Mobility      Stair Climbing SBA  Pt able to negotiate up and down 2 steps using the R sided railing.  PT provided cues regarding sequencing.  PT also provided cues for increased support through the arms for maintenance of PWB.  Pt does have a ramp at home, but requested to practice the steps for "in case she needs to use that entrance".     Car Transfer      Other:           Assessment:     Patient tolerated session fairly well and has demonstrated adequate safety with basic mobility. Pt is ready for D/C home from a PT " standpoint.  CM notified that pt will require a RW prior to her D/C.  Pt will also benefit from  PT services.  Pt expressed no particular concerns regarding her mobility upon D/C.      GOALS:   Multidisciplinary Problems       Physical Therapy Goals          Problem: Physical Therapy    Goal Priority Disciplines Outcome Interventions   Physical Therapy Goal     PT, PT/OT Progressing    Description: Patient will increase functional independence with mobility by performin. Sit to stand transfer with Modified [place]  -- MET  2. Bed to chair transfer with Modified [place] using Rolling Walker  -- MET  3. Gait  x 300 feet with Modified [place] using Rolling Walker.                            Discharge Recommendations:      Home with HH    Discharge Equipment Recommendations:     walker, rolling     At end of treatment, patient remained:      Up in chair    X In bed   X With alarm activated    Bed rails up   X Call bell in reach      Family/friends present     Restraints secured properly     In bathroom with CNA/RN notified     In gym with PT/PTA/tech     Nurse aware     Other:           PT Start Time: 826     PT Stop Time: 859  PT Total Time (min): 33 min     Billable Minutes: Gait Training 15 and Therapeutic Activity 18      2025

## 2025-03-17 NOTE — PT/OT/SLP PROGRESS
Occupational Therapy  Treatment    Name: Andree Mcclelland    : 1960 (64 y.o.)  MRN: [id number]          TREATMENT SUMMARY AND RECOMMENDATIONS:      Subjective Assessment:   No complaints  Lethargic   x Awake, alert, cooperative  Uncooperative    Agitated  Flat affect   x Appropriate  c/o fatigue    Confused  Treated at bedside     Emotionally liable  Treated in gym/dept.    Impulsive  Other:       Pain/Comfort:  Pain Rating 1: 4/10  Location - Side 1: Right  Location 1: hip  Pain Addressed 1: Pre-medicate for activity      Therapy Precautions:    Cognitive deficits  Spinal precautions    Collar - hard  Sternal precautions    Collar - soft   TLSO   x Fall risk  LSO    Hip precautions - posterior  Knee immobilizer    Hip precautions - anterior  WBAT    Impaired communication xRLE Partial weightbearing    Oxygen  TTWB    PEG tube  NWB    Visual deficits  Other:    Hearing deficits           Vitals Value   x Room air      Oxygen (L)     Blood pressure     Heart rate         Treatment Objectives:     Additional Treatment:  Pt reported performing all bathing, grooming, and dressing Mod I this morning.  Session focused on review of home safety during functional transfers and ADLs following her RLE PWB prec.  Reinforced to pt that she can not drive until released by her doctor.  Discussed how she will acquire groceries, pt reported her cousin can assist.  Discussed meals on wheels and pt reported that she is friends with the director and she will contact her when she gets home.     Assessment: Patient tolerated session well.  Pt is scheduled for discharge home today.    OT Plan: Discharge home with  services.      GOALS:   Multidisciplinary Problems       Occupational Therapy Goals          Problem: Occupational Therapy    Goal Priority Disciplines Outcome Interventions   Occupational Therapy Goal     OT, PT/OT Progressing    Description:   Patient will increase functional independence with ADLs by  performing:    Toileting from toilet with Modified [place] for hygiene and clothing management. (Goal Met)  Bathing from  shower chair/bench with Modified [place]. (Goal met)  Toilet transfer to toilet with Modified [place]. (Goal Met)                         Communication with Treatment Team:     Discharge Recommendations:    Discharge Equipment Recommendations:  walker, rolling, shower chair  Barriers to discharge:  Decreased caregiver support      At end of treatment, patient remained:   Up in chair    x In bed   x With alarm activated    Bed rails up    Call bell in reach     Family/friends present    Restraints secured properly    In bathroom with CNA/RN notified    In gym with PT/PTA/tech    Nurse aware    Other:         OT Date of Treatment: 03/17/25  OT Start Time: 0935  OT Stop Time: 0950  OT Total Time (min): 15 min    Billable Minutes:Therapeutic Activity 15      3/17/2025

## 2025-03-17 NOTE — PLAN OF CARE
Referral sent to [facility] for Walker, awaiting response.  Accepted with [facility].

## 2025-03-17 NOTE — ASSESSMENT & PLAN NOTE
-PT/OT   -Percocet for pain  -lovenox for DVT prophylaxis     Little to no literature found regarding DVT prophylaxis for non-operative hip fractures. Patient is able to ambulate and has no major risk factors. Will not discharge with anticoagulation. Particularly when considering patient's history of alcohol abuse increases her risk of repeat falls.

## 2025-03-17 NOTE — DISCHARGE SUMMARY
[facility] - Medical Surgical Unit  [facility] Medicine  Discharge Summary      Patient Name: Andree Mcclelland  MRN: [id number]  [facility]: [id number]  Patient Class: IP- Swing  Admission Date: 3/12/2025  Hospital Length of Stay: 5 days  Discharge Date and Time:  03/17/2025 12:28 PM  Attending Physician: Vish Resendez DO   Discharging Provider: Vish Resendez DO  Primary Care Provider: Vish Resendez DO    Primary Care Team: Networked reference to record PCT     HPI:   Patient is a 64y F with history of chronic hyponatremia secondary to alcoholism. Right greater trochanter fracture, non-operable, admitted to swing bed for physical therapy and occupation therapy. Discharged from [facility] on 3/12/25. Patient reports no bowel movement for 4 days.     * No surgery found *      Hospital Course:   Patient is a 64y F with history of chronic hyponatremia secondary to alcoholism. Right greater trochanter fracture, non-operable, admitted to swing bed for physical therapy and occupation therapy. Discharged from [facility] on 3/12/25. Patient reports no bowel movement for 4 days     Doing well today. Still has not had a bowel movement despite miralax and stool softener. Pain controlled with oral medications. Continue PT/OT. Sodium stable.     3/14/25: Patient doing well. Reports pain as a 7-8/10. Constipation, last bowel movement on 3/8/25, though is passing flatus.     3/15/25: Pt states she is having hip pain. But appears comfortable. She is lying in bed watching tv. Denies any sob, cp, abd pain. Had a bm.     3/16/25: Pt is lying in bed comfortably watching tv. She states that she is due for her pain medication. Denies any sob,cp, abd pain.    3/17/25: Discharge to home with home health. Patient will need walker with wheels for ambulation as well as a bedside commode.      Goals of Care Treatment Preferences:  Code Status: Full Code      SDOH Screening:  The patient was screened for utility difficulties, food insecurity,  transport difficulties, housing insecurity, and interpersonal safety and there were no concerns identified this admission.     Consults:   Consults (From admission, onward)          Status Ordering Provider     Inpatient consult to Social Work/Case Management  Once        Provider:  (Not yet assigned)    Acknowledged MARGAUX TORRES            Assessment & Plan  Fracture of greater trochanter of right femur  -PT/OT   -Percocet for pain  -lovenox for DVT prophylaxis     Little to no literature found regarding DVT prophylaxis for non-operative hip fractures. Patient is able to ambulate and has no major risk factors. Will not discharge with anticoagulation. Particularly when considering patient's history of alcohol abuse increases her risk of repeat falls.   Chronic hyponatremia  Hyponatremia is likely due to chronic alcohol abuse. The patient's most recent sodium results are listed below.  Recent Labs     03/15/25  0421 03/17/25  0442   * 136     Plan  - Monitor sodium Daily.   - Patient hyponatremia is improving    Alcoholism  Monitor chronic hyponatremia.     Hypertension  Patient's blood pressure range in the last 24 hours was: BP  Min: 121/60  Max: 123/77.The patient's inpatient anti-hypertensive regimen is listed below:  Current Antihypertensives  lisinopriL tablet 40 mg, Daily, Oral    Plan  - BP is controlled, no changes needed to their regimen    Major depression in remission  -home meds as reconciled.       Emphysema, unspecified  Patient's COPD is controlled currently.   Continue scheduled inhalers.  Final Active Diagnoses:    Diagnosis Date Noted POA    PRINCIPAL PROBLEM:  Fracture of greater trochanter of right femur [S72.111A] 03/12/2025 Yes    Chronic hyponatremia [E87.1] 05/23/2022 Yes    Alcoholism [F10.20] 05/23/2022 Yes    Hypertension [I10] 05/23/2022 Yes    Major depression in remission [F32.5] 05/23/2022 Yes    Emphysema, unspecified [J43.9] 05/23/2022 Yes      Problems Resolved During  "this Admission:    Diagnosis Date Noted Date Resolved POA    Drug-induced constipation [K59.03] 03/13/2025 03/17/2025 Yes       Discharged Condition: good    Disposition: [place] [place] [facility]    Follow Up:   Contact information for follow-up providers       Vish Resendez DO. Go on 3/21/2025.    Specialty: Family Medicine  Why: @ 9:00am follow up appt  Contact information:  [address] [facility] [address]  [phone]                       Contact information for after-discharge care       Home Medical Care       [facility] .    Service: Home Health Services  Contact information:  [address] [facility] [id number]  [phone]                                 Patient Instructions:      WALKER FOR HOME USE     Order Specific Question Answer Comments   Type of Walker: Adult (5'4"-6'6")    With wheels? Yes 2   Height: 5' 4" (1.626 m)    Weight: 49 kg (108 lb 0.4 oz)    Length of need (1-99 months): 99    Does patient have medical equipment at home? none    Please check all that apply: Patient's condition impairs ambulation.    Please check all that apply: Patient is unable to safely ambulate without equipment.      Ambulatory referral/consult to Home Health   Standing Status: Future   Referral Priority: Routine Referral Type: Home Health   Referral Reason: Specialty Services Required   Requested Specialty: Home Health Services   Number of Visits Requested: 1       Significant Diagnostic Studies: N/A    Pending Diagnostic Studies:       None           Medications:  Reconciled Home Medications:      Medication List        START taking these medications      docusate sodium 100 MG capsule  Commonly known as: COLACE  Take 1 capsule (100 mg total) by mouth 2 (two) times daily.     folic acid 1 MG tablet  Commonly known as: FOLVITE  Take 1 tablet (1 mg total) by mouth once daily.  Start taking on: March 18, 2025     polyethylene glycol 17 gram Pwpk  Commonly known as: GLYCOLAX  Take 17 g by mouth " daily as needed for Constipation.     thiamine 100 MG tablet  Take 1 tablet (100 mg total) by mouth once daily.  Start taking on: March 18, 2025            CHANGE how you take these medications      oxyCODONE-acetaminophen 5-325 mg per tablet  Commonly known as: PERCOCET  Take 1 tablet by mouth every 6 (six) hours as needed for Pain.  What changed:   how much to take  when to take this            CONTINUE taking these medications      albuterol 90 mcg/actuation inhaler  Commonly known as: PROVENTIL/VENTOLIN HFA  INHALE 1 PUFF BY MOUTH EVERY 4 HOURS AS NEEDED FOR SHORTNESS OF BREATH OR WHEEZING.     aspirin 81 MG EC tablet  Commonly known as: ECOTRIN  Take 81 mg by mouth once daily.     calcium carbonate 500 mg calcium (1,250 mg) chewable tablet  Commonly known as: OS-VALDEMAR  Take 22 mg by mouth.     cetirizine 10 MG tablet  Commonly known as: ZYRTEC  TAKE 1 TABLET BY MOUTH EVERY DAY     ergocalciferol 50,000 unit Cap  Commonly known as: ERGOCALCIFEROL  TAKE 1 CAPSULE BY MOUTH EVERY 7 DAYS FOR 8 DOSES     EScitalopram oxalate 10 MG tablet  Commonly known as: LEXAPRO  TAKE 1 TABLET BY MOUTH EVERY DAY     gabapentin 300 MG capsule  Commonly known as: NEURONTIN  TAKE 1 CAPSULE BY MOUTH EVERY DAY     hydrOXYzine pamoate 25 MG Cap  Commonly known as: VISTARIL  TAKE 1 CAPSULE BY MOUTH EVERY 4 HOURS AS NEEDED FOR ANXIETY OR ITCHING     lisinopriL 40 MG tablet  Commonly known as: PRINIVIL,ZESTRIL  TAKE 1 TABLET BY MOUTH EVERY DAY     lovastatin 40 MG tablet  Commonly known as: MEVACOR  TAKE 1 TABLET BY MOUTH EVERY DAY     multivitamin per tablet  Commonly known as: THERAGRAN  Take by mouth.     ondansetron 8 MG tablet  Commonly known as: ZOFRAN  Take 8 mg by mouth 3 (three) times daily as needed for Nausea.     SYMBICORT 160-4.5 mcg/actuation Hfaa  Generic drug: budesonide-formoterol 160-4.5 mcg  INHALE 2 PUFFS BY MOUTH EVERY 12 HOURS.     tiotropium 18 mcg inhalation capsule  Commonly known as: SPIRIVA  Inhale 1 capsule (18  mcg total) into the lungs once daily. Controller            ASK your doctor about these medications      nicotine 14 mg/24 hr  Commonly known as: NICODERM CQ  Place 1 patch onto the skin once daily.  Ask about: Should I take this medication?              Indwelling Lines/Drains at time of discharge:   Lines/Drains/Airways       None                   Time spent on the discharge of patient: 35 minutes         Vish Resendez DO  Department of Hospital Medicine  [facility] - Medical Surgical Unit

## 2025-03-18 ENCOUNTER — PATIENT OUTREACH (OUTPATIENT)
Dept: ADMINISTRATIVE | Facility: CLINIC | Age: 65
End: 2025-03-18
Payer: MEDICARE

## 2025-03-18 NOTE — PT/OT/SLP DISCHARGE
Occupational Therapy Discharge Summary    Andree Mcclelland  MRN: [id number]   Principal Problem: Fracture of greater trochanter of right femur      Patient Discharged from acute Occupational Therapy on 03/17/25.    Assessment:      Patient appropriate for care in another setting.    Objective:     GOALS:   Multidisciplinary Problems       Occupational Therapy Goals          Problem: Occupational Therapy    Goal Priority Disciplines Outcome Interventions   Occupational Therapy Goal     OT, PT/OT Progressing    Description:   Patient will increase functional independence with ADLs by performing:    Toileting from toilet with Modified [place] for hygiene and clothing management. (Goal Met)  Bathing from  shower chair/bench with Modified [place]. (Goal met)  Toilet transfer to toilet with Modified [place]. (Goal Met)                         Reasons for Discontinuation of Therapy Services  Transfer to alternate level of care.      Plan:     Patient Discharged to: Home with Home Health Service    3/18/2025

## 2025-03-18 NOTE — PT/OT/SLP DISCHARGE
Physical Therapy Discharge Summary    Name: Andree Mcclelland  MRN: [id number]   Principal Problem: Fracture of greater trochanter of right femur     Patient Discharged from acute Physical Therapy on 3-.     Assessment:     Patient appropriate for care in another setting.    Objective:     GOALS:   Multidisciplinary Problems       Physical Therapy Goals          Problem: Physical Therapy    Goal Priority Disciplines Outcome Interventions   Physical Therapy Goal     PT, PT/OT Adequate for Care Transition    Description: Patient will increase functional independence with mobility by performin. Sit to stand transfer with Modified [place]  2. Bed to chair transfer with Modified [place] using Rolling Walker  3. Gait  x 300 feet with Modified [place] using Rolling Walker.                          Reasons for Discontinuation of Therapy Services  Transfer to alternate level of care.      Plan:     Patient Discharged to: Home with Home Health Service.      3/18/2025

## 2025-03-21 ENCOUNTER — OFFICE VISIT (OUTPATIENT)
Dept: FAMILY MEDICINE | Facility: CLINIC | Age: 65
End: 2025-03-21
Payer: MEDICARE

## 2025-03-21 VITALS
RESPIRATION RATE: 20 BRPM | DIASTOLIC BLOOD PRESSURE: 68 MMHG | WEIGHT: 106 LBS | HEART RATE: 109 BPM | HEIGHT: 64 IN | TEMPERATURE: 98 F | BODY MASS INDEX: 18.1 KG/M2 | OXYGEN SATURATION: 97 % | SYSTOLIC BLOOD PRESSURE: 103 MMHG

## 2025-03-21 DIAGNOSIS — Z91.81 HISTORY OF RECENT FALL: ICD-10-CM

## 2025-03-21 DIAGNOSIS — Z09 HOSPITAL DISCHARGE FOLLOW-UP: Primary | ICD-10-CM

## 2025-03-21 DIAGNOSIS — E87.1 CHRONIC HYPONATREMIA: ICD-10-CM

## 2025-03-21 DIAGNOSIS — S72.114D CLOSED NONDISPLACED FRACTURE OF GREATER TROCHANTER OF RIGHT FEMUR WITH ROUTINE HEALING, SUBSEQUENT ENCOUNTER: ICD-10-CM

## 2025-03-21 DIAGNOSIS — K59.03 DRUG-INDUCED CONSTIPATION: ICD-10-CM

## 2025-03-21 DIAGNOSIS — F10.20 ALCOHOLISM: ICD-10-CM

## 2025-03-21 PROBLEM — K59.00 CONSTIPATION: Status: ACTIVE | Noted: 2025-03-13

## 2025-03-21 NOTE — ASSESSMENT & PLAN NOTE
Educated the patient about the importance of folic acid supplementation for cell regeneration and reducing risks of strokes, heart attacks, and certain cancers, especially in patients with heavy alcohol use.  Discussed the role of thiamine in reducing risk of alcohol withdrawal.  Advised against alcohol consumption, especially in combination with pain medication.  Continued folic acid 1 mg daily.  Continued thiamine 100 mg daily.  Noted that the patient reports drinking 3 or 4 beers a day.  Advised against drinking alcohol, especially in combination with pain medication.

## 2025-03-21 NOTE — ASSESSMENT & PLAN NOTE
Reviewed recent hospitalization for inoperable right hip fracture.  Reviewed recent labs from March 17th: stable sodium levels, good kidney and liver function, stable mild anemia.    Evaluated the patient's condition following a fall at home onto the right side, resulting in a right hip fracture.  Determined the greater trochanter fracture was not operable upon exam.  Noted that the patient was initially admitted to the hospital and then transferred to rehab.  Maintained two-touch weight bearing in the hospital and provided physical therapy and occupational therapy.  Patient was discharged with a walker and prescribed Percocet 5-325 every 6 hours as needed for pain.    Instructed the patient to continue with home-based physical therapy and occupational therapy.  Advised the patient to avoid driving until cleared by Dr. Figueroa and completion of physical therapy.

## 2025-03-21 NOTE — PROGRESS NOTES
Transitional Care Note  Subjective:         Patient ID: Andree Mcclelland is a 64 y.o. female.  Chief Complaint: Follow-up (Hospital follow up for right hip fracture/Seen Dr.Michael Figueroa in [facility]/Follow up with connie on 3/26/25//In patient rehab in [facility] admitted on 3/12/25)    Family and/or Caretaker present at visit?  Yes.  Diagnostic tests reviewed/disposition: I have reviewed all completed as well as pending diagnostic tests at the time of discharge.  Disease/illness education: Right trochanter fracture   Home health/community services discussion/referrals: Patient has home health established at [facility] .   Establishment or re-establishment of referral orders for community resources: No other necessary community resources.   Discussion with other health care providers: No discussion with other health care providers necessary.   History of Present Illness    Andree presents today for follow up of right hip fracture. She is accompanied today by a family member.   She sustained a fall at home, landing on her right side. Imaging showed a greater trochanter fracture which was determined to be non-operative. She was initially admitted to but was subsequently transferred to [facility] due to lack of orthopedic coverage. She then was transferred to [facility] for swing bed and had services with PT/OT. She is currently receiving home health services through [facility] from [place].    MEDICATIONS:    Current medications include oxycodone 5-325 PRN for pain, Albuterol, Aspirin, Calcium Carbonate, Zyrtec D, Lexapro, Gabapentin, Hydroxyzine, Lisinopril, Lovastatin, Zofran, Simplicef, and Spiriva.    LABS:    Sodium levels were 133 on March 15th and 136 on March 17th, indicating stability.   Kidney and liver function tests were within normal ranges.   She has mild, stable anemia.    SOCIAL HISTORY:    She reports consuming 3-4 beers daily.      Current Outpatient  Medications:     albuterol (PROVENTIL/VENTOLIN HFA) 90 mcg/actuation inhaler, INHALE 1 PUFF BY MOUTH EVERY 4 HOURS AS NEEDED FOR SHORTNESS OF BREATH OR WHEEZING., Disp: 18 g, Rfl: 2    aspirin (ECOTRIN) 81 MG EC tablet, Take 81 mg by mouth once daily., Disp: , Rfl:     budesonide-formoterol 160-4.5 mcg (SYMBICORT) 160-4.5 mcg/actuation HFAA, INHALE 2 PUFFS BY MOUTH EVERY 12 HOURS., Disp: 10.2 g, Rfl: 6    calcium carbonate (OS-VALDEMAR) 500 mg calcium (1,250 mg) chewable tablet, Take 22 mg by mouth., Disp: , Rfl:     cetirizine (ZYRTEC) 10 MG tablet, TAKE 1 TABLET BY MOUTH EVERY DAY, Disp: 90 tablet, Rfl: 3    docusate sodium (COLACE) 100 MG capsule, Take 1 capsule (100 mg total) by mouth 2 (two) times daily., Disp: 60 capsule, Rfl: 0    ergocalciferol (ERGOCALCIFEROL) 50,000 unit Cap, TAKE 1 CAPSULE BY MOUTH EVERY 7 DAYS FOR 8 DOSES, Disp: 12 capsule, Rfl: 2    EScitalopram oxalate (LEXAPRO) 10 MG tablet, TAKE 1 TABLET BY MOUTH EVERY DAY, Disp: 90 tablet, Rfl: 1    folic acid (FOLVITE) 1 MG tablet, Take 1 tablet (1 mg total) by mouth once daily., Disp: 30 tablet, Rfl: 11    gabapentin (NEURONTIN) 300 MG capsule, TAKE 1 CAPSULE BY MOUTH EVERY DAY, Disp: 30 capsule, Rfl: 8    hydrOXYzine pamoate (VISTARIL) 25 MG Cap, TAKE 1 CAPSULE BY MOUTH EVERY 4 HOURS AS NEEDED FOR ANXIETY OR ITCHING, Disp: 120 capsule, Rfl: 8    lisinopriL (PRINIVIL,ZESTRIL) 40 MG tablet, TAKE 1 TABLET BY MOUTH EVERY DAY, Disp: 90 tablet, Rfl: 4    lovastatin (MEVACOR) 40 MG tablet, TAKE 1 TABLET BY MOUTH EVERY DAY, Disp: 90 tablet, Rfl: 1    multivitamin (THERAGRAN) per tablet, Take by mouth., Disp: , Rfl:     oxyCODONE-acetaminophen (PERCOCET) 5-325 mg per tablet, Take 1 tablet by mouth every 6 (six) hours as needed for Pain., Disp: 28 tablet, Rfl: 0    thiamine 100 MG tablet, Take 1 tablet (100 mg total) by mouth once daily., Disp: 30 tablet, Rfl: 11    tiotropium (SPIRIVA) 18 mcg inhalation capsule, Inhale 1 capsule (18 mcg total) into the lungs  once daily. Controller, Disp: 90 capsule, Rfl: 3    ondansetron (ZOFRAN) 8 MG tablet, Take 8 mg by mouth 3 (three) times daily as needed for Nausea. (Patient not taking: Reported on 3/21/2025), Disp: , Rfl:     polyethylene glycol (GLYCOLAX) 17 gram PwPk, Take 17 g by mouth daily as needed for Constipation. (Patient not taking: Reported on 3/21/2025), Disp: 30 each, Rfl: 0  Objective:      Physical Exam  Vitals and nursing note reviewed.   Constitutional:       General: She is not in acute distress.     Appearance: She is not ill-appearing.   HENT:      Head: Normocephalic and atraumatic.      Mouth/Throat:      Mouth: Mucous membranes are moist.      Pharynx: Oropharynx is clear.   Eyes:      General: No scleral icterus.     Extraocular Movements: Extraocular movements intact.      Conjunctiva/sclera: Conjunctivae normal.      Pupils: Pupils are equal, round, and reactive to light.   Neck:      Vascular: No carotid bruit.   Cardiovascular:      Rate and Rhythm: Normal rate and regular rhythm.      Heart sounds: No murmur heard.     No friction rub. No gallop.   Pulmonary:      Effort: Pulmonary effort is normal. No respiratory distress.      Breath sounds: Normal breath sounds. No wheezing, rhonchi or rales.   Abdominal:      General: Abdomen is flat. Bowel sounds are normal. There is no distension.      Palpations: Abdomen is soft. There is no mass.      Tenderness: There is no abdominal tenderness.   Musculoskeletal:         General: Normal range of motion.      Cervical back: Normal range of motion and neck supple.   Skin:     General: Skin is warm and dry.   Neurological:      General: No focal deficit present.      Mental Status: She is alert.      Comments: Ambulating with walker    Psychiatric:         Mood and Affect: Mood normal.           Assessment:       1. Hospital discharge follow-up    2. History of recent fall    3. Closed nondisplaced fracture of greater trochanter of right femur with routine  healing, subsequent encounter    4. Chronic hyponatremia    5. Alcoholism    6. Drug-induced constipation        Plan:     1. Hospital discharge follow-up    2. History of recent fall    3. Closed nondisplaced fracture of greater trochanter of right femur with routine healing, subsequent encounter  Assessment & Plan:  Reviewed recent hospitalization for inoperable right hip fracture.  Reviewed recent labs from March 17th: stable sodium levels, good kidney and liver function, stable mild anemia.    Evaluated the patient's condition following a fall at home onto the right side, resulting in a right hip fracture.  Determined the greater trochanter fracture was not operable upon exam.  Noted that the patient was initially admitted to the hospital and then transferred to rehab.  Maintained two-touch weight bearing in the hospital and provided physical therapy and occupational therapy.  Patient was discharged with a walker and prescribed Percocet 5-325 every 6 hours as needed for pain.    Instructed the patient to continue with home-based physical therapy and occupational therapy.  Advised the patient to avoid driving until cleared by Dr. Figueroa and completion of physical therapy.        4. Chronic hyponatremia  Assessment & Plan:  Monitored the patient's sodium levels, noting improvement from 133 on March 15th to 136 on March 17th.  Assessed that the hyponatremia is likely due to chronic alcohol use.  Emphasized the importance of folic acid supplementation for cell regeneration.  Continued folic acid 1 mg daily.      5. Alcoholism  Overview:  She drinks at least a 6 pack of alcohol per day. Patient has been counseled on cessation.     Assessment & Plan:  Educated the patient about the importance of folic acid supplementation for cell regeneration and reducing risks of strokes, heart attacks, and certain cancers, especially in patients with heavy alcohol use.  Discussed the role of thiamine in reducing risk of alcohol  withdrawal.  Advised against alcohol consumption, especially in combination with pain medication.  Continued folic acid 1 mg daily.  Continued thiamine 100 mg daily.  Noted that the patient reports drinking 3 or 4 beers a day.  Advised against drinking alcohol, especially in combination with pain medication.      6. Drug-induced constipation  Assessment & Plan:  Continued Colace 100 mg 2 times daily as needed for constipation.  Continued Miralax 17 g by mouth as needed for constipation.  Noted that patient reports having constipation during hospital stay.      FOLLOW-UP:  Scheduled follow-up visit with Dr. Figueroa on March 26th.  Instructed the patient to contact Dr. Figueroa's office to confirm appointment time.  Follow up in 3 months.       Follow up in about 3 months (around 6/21/2025) for Follow Up with Dr. CHANO Reyes In addition to their scheduled follow up, the patient has also been instructed to follow up on as needed basis.

## 2025-03-21 NOTE — ASSESSMENT & PLAN NOTE
Monitored the patient's sodium levels, noting improvement from 133 on March 15th to 136 on March 17th.  Assessed that the hyponatremia is likely due to chronic alcohol use.  Emphasized the importance of folic acid supplementation for cell regeneration.  Continued folic acid 1 mg daily.

## 2025-03-24 ENCOUNTER — TELEPHONE (OUTPATIENT)
Dept: FAMILY MEDICINE | Facility: CLINIC | Age: 65
End: 2025-03-24
Payer: MEDICARE

## 2025-03-24 DIAGNOSIS — R91.1 PULMONARY NODULE: Primary | ICD-10-CM

## 2025-03-24 DIAGNOSIS — S72.114D CLOSED NONDISPLACED FRACTURE OF GREATER TROCHANTER OF RIGHT FEMUR WITH ROUTINE HEALING, SUBSEQUENT ENCOUNTER: Primary | ICD-10-CM

## 2025-03-24 NOTE — TELEPHONE ENCOUNTER
Spoke with Dr Adan Lombardo office regarding this patient and her follow and he does not take her insurance. We referrad her to Dr Spencer in [place]. I called the patient to inform her and to give her the phone number and she stated she did  not need to see an ortho. I told her she needed to follow up with one and attempted to give her the phone number to schedule. She stated she did not have anything to write on right now and hung up.

## 2025-04-03 ENCOUNTER — OFFICE VISIT (OUTPATIENT)
Dept: ORTHOPEDICS | Facility: CLINIC | Age: 65
End: 2025-04-03
Payer: MEDICARE

## 2025-04-03 ENCOUNTER — HOSPITAL ENCOUNTER (OUTPATIENT)
Dept: RADIOLOGY | Facility: CLINIC | Age: 65
Discharge: HOME | End: 2025-04-03
Attending: PHYSICIAN ASSISTANT
Payer: MEDICARE

## 2025-04-03 VITALS
WEIGHT: 106.19 LBS | SYSTOLIC BLOOD PRESSURE: 105 MMHG | HEIGHT: 64 IN | HEART RATE: 92 BPM | DIASTOLIC BLOOD PRESSURE: 60 MMHG | BODY MASS INDEX: 18.13 KG/M2

## 2025-04-03 DIAGNOSIS — S72.111A CLOSED AVULSION FRACTURE OF GREATER TROCHANTER OF FEMUR, RIGHT, INITIAL ENCOUNTER: Primary | ICD-10-CM

## 2025-04-03 DIAGNOSIS — S72.114D CLOSED NONDISPLACED FRACTURE OF GREATER TROCHANTER OF RIGHT FEMUR WITH ROUTINE HEALING, SUBSEQUENT ENCOUNTER: ICD-10-CM

## 2025-04-03 PROCEDURE — 3078F DIAST BP <80 MM HG: CPT | Mod: CPTII,,, | Performed by: PHYSICIAN ASSISTANT

## 2025-04-03 PROCEDURE — 99204 OFFICE O/P NEW MOD 45 MIN: CPT | Mod: ,,, | Performed by: PHYSICIAN ASSISTANT

## 2025-04-03 PROCEDURE — 1160F RVW MEDS BY RX/DR IN RCRD: CPT | Mod: CPTII,,, | Performed by: PHYSICIAN ASSISTANT

## 2025-04-03 PROCEDURE — 1100F PTFALLS ASSESS-DOCD GE2>/YR: CPT | Mod: CPTII,,, | Performed by: PHYSICIAN ASSISTANT

## 2025-04-03 PROCEDURE — 3288F FALL RISK ASSESSMENT DOCD: CPT | Mod: CPTII,,, | Performed by: PHYSICIAN ASSISTANT

## 2025-04-03 PROCEDURE — 1157F ADVNC CARE PLAN IN RCRD: CPT | Mod: CPTII,,, | Performed by: PHYSICIAN ASSISTANT

## 2025-04-03 PROCEDURE — 1111F DSCHRG MED/CURRENT MED MERGE: CPT | Mod: CPTII,,, | Performed by: PHYSICIAN ASSISTANT

## 2025-04-03 PROCEDURE — 3008F BODY MASS INDEX DOCD: CPT | Mod: CPTII,,, | Performed by: PHYSICIAN ASSISTANT

## 2025-04-03 PROCEDURE — 73502 X-RAY EXAM HIP UNI 2-3 VIEWS: CPT | Mod: RT,,, | Performed by: PHYSICIAN ASSISTANT

## 2025-04-03 PROCEDURE — 1159F MED LIST DOCD IN RCRD: CPT | Mod: CPTII,,, | Performed by: PHYSICIAN ASSISTANT

## 2025-04-03 PROCEDURE — 4010F ACE/ARB THERAPY RXD/TAKEN: CPT | Mod: CPTII,,, | Performed by: PHYSICIAN ASSISTANT

## 2025-04-03 PROCEDURE — 3074F SYST BP LT 130 MM HG: CPT | Mod: CPTII,,, | Performed by: PHYSICIAN ASSISTANT

## 2025-04-04 DIAGNOSIS — J43.1 PANLOBULAR EMPHYSEMA: ICD-10-CM

## 2025-04-04 RX ORDER — BUDESONIDE AND FORMOTEROL FUMARATE DIHYDRATE 160; 4.5 UG/1; UG/1
2 AEROSOL RESPIRATORY (INHALATION) EVERY 12 HOURS
Qty: 6 G | Refills: 6 | Status: SHIPPED | OUTPATIENT
Start: 2025-04-04

## 2025-04-07 DIAGNOSIS — S72.114D CLOSED NONDISPLACED FRACTURE OF GREATER TROCHANTER OF RIGHT FEMUR WITH ROUTINE HEALING, SUBSEQUENT ENCOUNTER: Primary | ICD-10-CM

## 2025-04-07 RX ORDER — OXYCODONE AND ACETAMINOPHEN 5; 325 MG/1; MG/1
1 TABLET ORAL EVERY 6 HOURS PRN
Qty: 28 TABLET | Refills: 0 | Status: SHIPPED | OUTPATIENT
Start: 2025-04-07

## 2025-04-08 ENCOUNTER — HOSPITAL ENCOUNTER (OUTPATIENT)
Dept: RADIOLOGY | Facility: HOSPITAL | Age: 65
Discharge: HOME | End: 2025-04-08
Attending: PHYSICIAN ASSISTANT
Payer: MEDICARE

## 2025-04-08 DIAGNOSIS — S72.111A CLOSED AVULSION FRACTURE OF GREATER TROCHANTER OF FEMUR, RIGHT, INITIAL ENCOUNTER: ICD-10-CM

## 2025-04-08 PROCEDURE — 73700 CT LOWER EXTREMITY W/O DYE: CPT | Mod: TC,RT

## 2025-04-09 ENCOUNTER — TELEPHONE (OUTPATIENT)
Dept: ORTHOPEDICS | Facility: CLINIC | Age: 65
End: 2025-04-09
Payer: MEDICARE

## 2025-04-09 NOTE — TELEPHONE ENCOUNTER
Patient left a voicemail regarding her CT results    Called and spoke with patient to let her know that we have seen her images were done but the report has been signed off yet. Patient was informed that once that is done than the provider will get a notification on it. Based on her results it will determine if she can start physical therapy.     Patient voiced understanding

## 2025-04-14 NOTE — TELEPHONE ENCOUNTER
Let patient know that Familia reviewed her results. He stated that he would get a second opinion from Dr. Crawford. Based on his outcome it will determine if she can start physical therapy     Patient voiced understanding.

## 2025-04-15 NOTE — TELEPHONE ENCOUNTER
Called and spoke with patient to let her know that she can go ahead and start physical therapy     Patient verbally understood

## 2025-04-17 ENCOUNTER — CLINICAL SUPPORT (OUTPATIENT)
Dept: REHABILITATION | Facility: HOSPITAL | Age: 65
End: 2025-04-17
Payer: MEDICARE

## 2025-04-17 DIAGNOSIS — Z74.09 IMPAIRED FUNCTIONAL MOBILITY, BALANCE, GAIT, AND ENDURANCE: Primary | ICD-10-CM

## 2025-04-17 DIAGNOSIS — S72.111A CLOSED AVULSION FRACTURE OF GREATER TROCHANTER OF FEMUR, RIGHT, INITIAL ENCOUNTER: ICD-10-CM

## 2025-04-17 PROCEDURE — 97162 PT EVAL MOD COMPLEX 30 MIN: CPT

## 2025-04-20 DIAGNOSIS — F41.9 ANXIETY: ICD-10-CM

## 2025-04-21 RX ORDER — HYDROXYZINE PAMOATE 25 MG/1
CAPSULE ORAL
Qty: 120 CAPSULE | Refills: 8 | Status: SHIPPED | OUTPATIENT
Start: 2025-04-21
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
## 2025-04-21 NOTE — PROGRESS NOTES
Chief Complaint:   Chief Complaint   Patient presents with    Injury     RT hip fracture (not operable) - DOI 03/07/25 - Patient reports pain has greatly improved over the past month, is still a constant ache in the hip. Report pain worsens with activity and radiates down the side of the thigh. Attending PT twice weekly with improvement. Taking Oxycodone PRN for pain with relief.        History of present illness:    This is a 64 y.o. year old   History of Present Illness    CHIEF COMPLAINT:  - Right hip pain    HPI:  Andree presents with right hip pain following a fall while walking up steps approximately one month ago, possibly due to equilibrium issues. The fall resulted in a hip fracture, leading to a two-week hospitalization without surgery. Pain is described as an ache localized to the hip area, with an associated limp. She reports the pain is significantly better and improving. She denies pain in the groin area but mentions a large bruise that extended beyond the fall site.    She experiences soreness with certain motions like pulling her leg up and bending her knee simultaneously, and reports pain when crossing her leg in a figure-four position, specifically in the lateral hip area. She can perform some movements, such as sitting cross-legged.    Currently undergoing home health therapy twice weekly, with plans for release next week. She uses a walker but reports also walking without it. She denies problems with the left hip, dizziness during the fall, and any other ongoing medical issues besides the hip fracture. Andree stayed in the hospital for 2 weeks after the fall  Home health therapy twice weekly for about a month    IMAGING:  - XR Right Hip: Today, show a piece of bone broken off from the hip  - XR Right Hip: Taken at the hospital when the patient was admitted          Current Outpatient Medications   Medication Sig    albuterol (PROVENTIL/VENTOLIN HFA) 90 mcg/actuation inhaler INHALE 1 PUFF BY MOUTH EVERY  4 HOURS AS NEEDED FOR SHORTNESS OF BREATH OR WHEEZING.    calcium carbonate (OS-VALDEMAR) 500 mg calcium (1,250 mg) chewable tablet Take 22 mg by mouth.    cetirizine (ZYRTEC) 10 MG tablet TAKE 1 TABLET BY MOUTH EVERY DAY    ergocalciferol (ERGOCALCIFEROL) 50,000 unit Cap TAKE 1 CAPSULE BY MOUTH EVERY 7 DAYS FOR 8 DOSES    EScitalopram oxalate (LEXAPRO) 10 MG tablet TAKE 1 TABLET BY MOUTH EVERY DAY    folic acid (FOLVITE) 1 MG tablet Take 1 tablet (1 mg total) by mouth once daily.    gabapentin (NEURONTIN) 300 MG capsule TAKE 1 CAPSULE BY MOUTH EVERY DAY    lisinopriL (PRINIVIL,ZESTRIL) 40 MG tablet TAKE 1 TABLET BY MOUTH EVERY DAY    lovastatin (MEVACOR) 40 MG tablet TAKE 1 TABLET BY MOUTH EVERY DAY    multivitamin (THERAGRAN) per tablet Take by mouth.    ondansetron (ZOFRAN) 8 MG tablet Take 8 mg by mouth 3 (three) times daily as needed for Nausea.    thiamine 100 MG tablet Take 1 tablet (100 mg total) by mouth once daily.    tiotropium (SPIRIVA) 18 mcg inhalation capsule Inhale 1 capsule (18 mcg total) into the lungs once daily. Controller    aspirin (ECOTRIN) 81 MG EC tablet Take 81 mg by mouth once daily. (Patient not taking: Reported on 4/3/2025)    budesonide-formoterol 160-4.5 mcg (SYMBICORT) 160-4.5 mcg/actuation HFAA INHALE 2 PUFFS BY MOUTH EVERY 12 HOURS.    docusate sodium (COLACE) 100 MG capsule Take 1 capsule (100 mg total) by mouth 2 (two) times daily. (Patient not taking: Reported on 4/3/2025)    hydrOXYzine pamoate (VISTARIL) 25 MG Cap TAKE 1 CAPSULE BY MOUTH EVERY 4 HOURS AS NEEDED FOR ANXIETY OR ITCHING    oxyCODONE-acetaminophen (PERCOCET) 5-325 mg per tablet Take 1 tablet by mouth every 6 (six) hours as needed for Pain.    polyethylene glycol (GLYCOLAX) 17 gram PwPk Take 17 g by mouth daily as needed for Constipation. (Patient not taking: Reported on 4/3/2025)     No current facility-administered medications for this visit.       Review of Systems:    Constitution:   Denies chills, fever, and  "sweats.  HENT:   Denies headaches or blurry vision.  Cardiovascular:  Denies chest pain or irregular heart beat.  Respiratory:   Denies cough or shortness of breath.  Gastrointestinal:  Denies abdominal pain, nausea, or vomiting.  Musculoskeletal:   Denies muscle cramps.  Neurological:   Denies dizziness or focal weakness.  Psychiatric/Behavior: Normal mental status.  Hematology/Lymph:  Denies bleeding problem or easy bruising/bleeding.  Skin:    Denies rash or suspicious lesions.    Examination:    Vital Signs:    Vitals:    04/03/25 1427   BP: 105/60   Pulse: 92   Weight: 48.2 kg (106 lb 3.2 oz)   Height: 5' 4" (1.626 m)       Body mass index is 18.23 kg/m².    Constitution:   Well-developed, well nourished patient in no acute distress.  Neurological:   Alert and oriented x 3 and cooperative to examination.     Psychiatric/Behavior: Normal mental status.  Respiratory:   No shortness of breath.  Eyes:    Extraoccular muscles intact  Skin:    No scars, rash or suspicious lesions.    Physical Exam:   Hip Exam:  Right  No obvious deformity.   Flexion to 120 degrees and internal and external rotation to 40 degrees.   Abduction to 45 degree and adduction to 30 degrees.   Negative MAT test.   Negative Stinchfield test.   No flexion contracture.   Positive  greater trochanteric tenderness.   Negative CHRISTA test.   5/5 strength, normal skin appearance and palpable pulses distally. Sensibility normal.    Imaging: X-rays ordered and images interpreted today personally by me of right hip three views.    Patient does have an avulsion fracture of the greater trochanter of the right hip which he is displaced  That has not appear to be any extension into the femoral neck or intertrochanteric       Assessment: Closed avulsion fracture of greater trochanter of femur, right, initial encounter  -     CT Hip Without Contrast Right; Future; Expected date: 04/04/2025  -     Ambulatory Referral/Consult to Physical Therapy; Future; " Expected date: 04/10/2025    Closed nondisplaced fracture of greater trochanter of right femur with routine healing, subsequent encounter  -     Ambulatory referral/consult to Orthopedics  -     X-Ray Hip 2 or 3 views Right with Pelvis when performed; Future; Expected date: 04/03/2025         Plan:    Assessment & Plan    Discuss the x-ray findings with Dr. Spencer and he recommends a CT scan to make sure that has no extension of the femoral neck  FRACTURE OF HEAD AND NECK OF RIGHT FEMUR:  - Discussed potential need for surgery with screws if fracture extension is found on CT.  - Ordered CT Hip to evaluate for fracture extension.  - Wait for CT results before starting outpatient PT.    DEPENDENCE ON ENABLING DEVICES:  - Use walker as needed.    FOLLOW-UP:  - Follow up in 1 month.                This note was generated with the assistance of ambient listening technology. Verbal consent was obtained by the patient and accompanying visitor(s) for the recording of patient appointment to facilitate this note. I attest to having reviewed and edited the generated note for accuracy, though some syntax or spelling errors may persist. Please contact the author of this note for any clarification.       DISCLAIMER: This note may have been dictated using voice recognition software and may contain grammatical errors.     NOTE: Consult report sent to referring provider via Mingly.

## 2025-05-05 ENCOUNTER — HOSPITAL ENCOUNTER (OUTPATIENT)
Dept: RADIOLOGY | Facility: CLINIC | Age: 65
Discharge: HOME | End: 2025-05-05
Attending: PHYSICIAN ASSISTANT
Payer: MEDICARE

## 2025-05-05 ENCOUNTER — OFFICE VISIT (OUTPATIENT)
Dept: ORTHOPEDICS | Facility: CLINIC | Age: 65
End: 2025-05-05
Payer: MEDICARE

## 2025-05-05 VITALS — WEIGHT: 106.94 LBS | HEIGHT: 64 IN | BODY MASS INDEX: 18.26 KG/M2

## 2025-05-05 DIAGNOSIS — S72.114D CLOSED NONDISPLACED FRACTURE OF GREATER TROCHANTER OF RIGHT FEMUR WITH ROUTINE HEALING, SUBSEQUENT ENCOUNTER: ICD-10-CM

## 2025-05-05 DIAGNOSIS — M54.16 LUMBAR RADICULOPATHY: ICD-10-CM

## 2025-05-05 DIAGNOSIS — S72.114D CLOSED NONDISPLACED FRACTURE OF GREATER TROCHANTER OF RIGHT FEMUR WITH ROUTINE HEALING, SUBSEQUENT ENCOUNTER: Primary | ICD-10-CM

## 2025-05-05 DIAGNOSIS — M54.9 BACK PAIN, UNSPECIFIED BACK LOCATION, UNSPECIFIED BACK PAIN LATERALITY, UNSPECIFIED CHRONICITY: ICD-10-CM

## 2025-05-05 DIAGNOSIS — M51.362 DEGENERATION OF INTERVERTEBRAL DISC OF LUMBAR REGION WITH DISCOGENIC BACK PAIN AND LOWER EXTREMITY PAIN: ICD-10-CM

## 2025-05-05 DIAGNOSIS — M41.9 SCOLIOSIS, UNSPECIFIED SCOLIOSIS TYPE, UNSPECIFIED SPINAL REGION: ICD-10-CM

## 2025-05-05 PROCEDURE — 72100 X-RAY EXAM L-S SPINE 2/3 VWS: CPT | Mod: ,,, | Performed by: PHYSICIAN ASSISTANT

## 2025-05-05 PROCEDURE — 1159F MED LIST DOCD IN RCRD: CPT | Mod: CPTII,,, | Performed by: PHYSICIAN ASSISTANT

## 2025-05-05 PROCEDURE — 3008F BODY MASS INDEX DOCD: CPT | Mod: CPTII,,, | Performed by: PHYSICIAN ASSISTANT

## 2025-05-05 PROCEDURE — 99214 OFFICE O/P EST MOD 30 MIN: CPT | Mod: ,,, | Performed by: PHYSICIAN ASSISTANT

## 2025-05-05 PROCEDURE — 1101F PT FALLS ASSESS-DOCD LE1/YR: CPT | Mod: CPTII,,, | Performed by: PHYSICIAN ASSISTANT

## 2025-05-05 PROCEDURE — 1157F ADVNC CARE PLAN IN RCRD: CPT | Mod: CPTII,,, | Performed by: PHYSICIAN ASSISTANT

## 2025-05-05 PROCEDURE — 3288F FALL RISK ASSESSMENT DOCD: CPT | Mod: CPTII,,, | Performed by: PHYSICIAN ASSISTANT

## 2025-05-05 PROCEDURE — 73502 X-RAY EXAM HIP UNI 2-3 VIEWS: CPT | Mod: RT,,, | Performed by: PHYSICIAN ASSISTANT

## 2025-05-05 PROCEDURE — 4010F ACE/ARB THERAPY RXD/TAKEN: CPT | Mod: CPTII,,, | Performed by: PHYSICIAN ASSISTANT

## 2025-05-05 PROCEDURE — 1160F RVW MEDS BY RX/DR IN RCRD: CPT | Mod: CPTII,,, | Performed by: PHYSICIAN ASSISTANT

## 2025-05-06 NOTE — PROGRESS NOTES
Chief Complaint:   Chief Complaint   Patient presents with    Follow-up     R hip - states pain is worse at night. States pain has been radiating down the leg and her lower back. States she has not been able to attend physical therapy due to insurance        History of present illness:    This is a 64 y.o. year old   History of Present Illness    CHIEF COMPLAINT:  - Hip pain with associated leg and foot pain.    HPI:  Andree presents with hip pain, described as hurting at the joint and radiating down to the knee and foot. Pain is localized to the side of the hip and characterized as aching without tingling or pins and needles sensations in the leg. She reports the pain as severe, particularly bothersome at night, causing difficulty sleeping and trouble bending down and touching her toes. Leaning backwards also causes discomfort in the back area.  Reports some issues with sciatica in the past    She has a history of scoliosis, including curvature of the spine and sway back. She has been attempting to contact her regular medical provider for a refill of pain medication (5 milligrams) but has not received a response. She is still waiting for insurance authorization to begin PT.    She denies any history of seeing a cardiologist.           Current Outpatient Medications   Medication Sig    albuterol (PROVENTIL/VENTOLIN HFA) 90 mcg/actuation inhaler INHALE 1 PUFF BY MOUTH EVERY 4 HOURS AS NEEDED FOR SHORTNESS OF BREATH OR WHEEZING.    budesonide-formoterol 160-4.5 mcg (SYMBICORT) 160-4.5 mcg/actuation HFAA INHALE 2 PUFFS BY MOUTH EVERY 12 HOURS.    calcium carbonate (OS-VALDEMAR) 500 mg calcium (1,250 mg) chewable tablet Take 22 mg by mouth.    cetirizine (ZYRTEC) 10 MG tablet TAKE 1 TABLET BY MOUTH EVERY DAY    ergocalciferol (ERGOCALCIFEROL) 50,000 unit Cap TAKE 1 CAPSULE BY MOUTH EVERY 7 DAYS FOR 8 DOSES    EScitalopram oxalate (LEXAPRO) 10 MG tablet TAKE 1 TABLET BY MOUTH EVERY DAY    folic acid (FOLVITE) 1 MG tablet Take 1  tablet (1 mg total) by mouth once daily.    gabapentin (NEURONTIN) 300 MG capsule TAKE 1 CAPSULE BY MOUTH EVERY DAY    hydrOXYzine pamoate (VISTARIL) 25 MG Cap TAKE 1 CAPSULE BY MOUTH EVERY 4 HOURS AS NEEDED FOR ANXIETY OR ITCHING    lisinopriL (PRINIVIL,ZESTRIL) 40 MG tablet TAKE 1 TABLET BY MOUTH EVERY DAY    lovastatin (MEVACOR) 40 MG tablet TAKE 1 TABLET BY MOUTH EVERY DAY    multivitamin (THERAGRAN) per tablet Take by mouth.    ondansetron (ZOFRAN) 8 MG tablet Take 8 mg by mouth 3 (three) times daily as needed for Nausea.    polyethylene glycol (GLYCOLAX) 17 gram PwPk Take 17 g by mouth daily as needed for Constipation.    thiamine 100 MG tablet Take 1 tablet (100 mg total) by mouth once daily.    aspirin (ECOTRIN) 81 MG EC tablet Take 81 mg by mouth once daily. (Patient not taking: Reported on 5/5/2025)    docusate sodium (COLACE) 100 MG capsule Take 1 capsule (100 mg total) by mouth 2 (two) times daily. (Patient not taking: Reported on 5/5/2025)    oxyCODONE-acetaminophen (PERCOCET) 5-325 mg per tablet Take 1 tablet by mouth every 6 (six) hours as needed for Pain. (Patient not taking: Reported on 5/5/2025)    tiotropium (SPIRIVA) 18 mcg inhalation capsule Inhale 1 capsule (18 mcg total) into the lungs once daily. Controller     No current facility-administered medications for this visit.       Review of Systems:    Constitution:   Denies chills, fever, and sweats.  HENT:   Denies headaches or blurry vision.  Cardiovascular:  Denies chest pain or irregular heart beat.  Respiratory:   Denies cough or shortness of breath.  Gastrointestinal:  Denies abdominal pain, nausea, or vomiting.  Musculoskeletal:   Denies muscle cramps.  Neurological:   Denies dizziness or focal weakness.  Psychiatric/Behavior: Normal mental status.  Hematology/Lymph:  Denies bleeding problem or easy bruising/bleeding.  Skin:    Denies rash or suspicious lesions.    Examination:    Vital Signs:    Vitals:    05/05/25 1426   BP: (P) 128/75  "  Pulse: (P) 101   Weight: 48.5 kg (106 lb 14.8 oz)   Height: 5' 4" (1.626 m)       Body mass index is 18.35 kg/m².    Constitution:   Well-developed, well nourished patient in no acute distress.  Neurological:   Alert and oriented x 3 and cooperative to examination.     Psychiatric/Behavior: Normal mental status.  Respiratory:   No shortness of breath.  Eyes:    Extraoccular muscles intact  Skin:    No scars, rash or suspicious lesions.    Physical Exam:   Hip Exam:  Right  No obvious deformity.   Flexion to 120 degrees and internal and external rotation to 40 degrees.   Abduction to 45 degree and adduction to 30 degrees.   Negative MAT test.   Negative Stinchfield test.   No flexion contracture.   Positive  greater trochanteric tenderness.   Negative CHRISTA test.   5/5 strength, normal skin appearance and palpable pulses distally. Sensibility normal.      Lumbar Exam     No swelling, erythema or increased heat   Positive tenderness over spinous process and paravertebral musculature   Positive Discomfort with lumbar flexion, extension, lateral rotation and bending   Manual motor testing of the lower extremities is 5 out of 5 bilaterally   DTRs are intact and symmetrical  Negative Straight leg raise   No sensory deficits to light touch pedal pulses 2+   FImaging: X-rays ordered and images interpreted today personally by me of right hip three views   Patient has a greater trochanter avulsion fracture which has a maintained position from previous x-rays.    CT was also reviewed again and that has no extension of the femoral neck or intertrochanteric area with any type of the fracture noted.      Lumbar spine two views taken.    Patient has an extensive scoliosis of the lumbar spine.    She has a degenerative disc disease along with some facet arthropathy noted in his x-rays as well        Assessment: Closed nondisplaced fracture of greater trochanter of right femur with routine healing, subsequent encounter  -     " X-Ray Hip 2 or 3 views Right with Pelvis when performed; Future; Expected date: 05/05/2025    Back pain, unspecified back location, unspecified back pain laterality, unspecified chronicity  -     X-Ray Lumbar Spine 2 Or 3 Views; Future; Expected date: 05/05/2025    Degeneration of intervertebral disc of lumbar region with discogenic back pain and lower extremity pain    Scoliosis, unspecified scoliosis type, unspecified spinal region    Lumbar radiculopathy  -     MRI Lumbar Spine Without Contrast; Future; Expected date: 05/06/2025         Plan:    Assessment & Plan    LOW BACK PAIN:  - Mentioned possibility of back injections depending on MRI results.  - Ordered [facility] at [facility].    The patient has pain seems to be mainly coming from her lower back area with radicular symptoms now   She will course she is still does have some greater trochanteric tenderness area but this is improving significantly that has ambulating without difficulty or assistive devices today.    Recommend she get going to physical therapy we are going to work on her hip and also he can work on her lumbar spine.    We will get an MRI of the lumbar spine refer out for appropriate treatment as indicated   Follow up in his office in 1 month for repeat x-ray of her right hip    This note was generated with the assistance of ambient listening technology. Verbal consent was obtained by the patient and accompanying visitor(s) for the recording of patient appointment to facilitate this note. I attest to having reviewed and edited the generated note for accuracy, though some syntax or spelling errors may persist. Please contact the author of this note for any clarification.       DISCLAIMER: This note may have been dictated using voice recognition software and may contain grammatical errors.     NOTE: Consult report sent to referring provider via [employer].

## 2025-05-08 ENCOUNTER — CLINICAL SUPPORT (OUTPATIENT)
Dept: REHABILITATION | Facility: HOSPITAL | Age: 65
End: 2025-05-08
Payer: MEDICARE

## 2025-05-08 ENCOUNTER — EXTERNAL HOME HEALTH (OUTPATIENT)
Dept: HOME HEALTH SERVICES | Facility: HOSPITAL | Age: 65
End: 2025-05-08
Payer: MEDICARE

## 2025-05-08 DIAGNOSIS — S72.111A CLOSED AVULSION FRACTURE OF GREATER TROCHANTER OF FEMUR, RIGHT, INITIAL ENCOUNTER: Primary | ICD-10-CM

## 2025-05-08 DIAGNOSIS — M62.81 GENERALIZED MUSCLE WEAKNESS: ICD-10-CM

## 2025-05-08 DIAGNOSIS — R29.6 FREQUENT FALLS: ICD-10-CM

## 2025-05-08 DIAGNOSIS — Z74.09 IMPAIRED FUNCTIONAL MOBILITY, BALANCE, GAIT, AND ENDURANCE: ICD-10-CM

## 2025-05-08 PROCEDURE — 97530 THERAPEUTIC ACTIVITIES: CPT

## 2025-05-08 PROCEDURE — 97110 THERAPEUTIC EXERCISES: CPT

## 2025-05-08 NOTE — PROGRESS NOTES
Outpatient Rehab    Physical Therapy Visit    Patient Name: Andree Mcclelland  MRN: [id number]  YOB: 1960  Encounter Date: 5/8/2025    Therapy Diagnosis:   Encounter Diagnoses   Name Primary?    Closed avulsion fracture of greater trochanter of femur, right, initial encounter Yes    Impaired functional mobility, balance, gait, and endurance     Generalized muscle weakness     Frequent falls      Physician: Familia De Jesus PA-C    Physician Orders: Eval and Treat  Medical Diagnosis: Displaced fracture of greater trochanter of right femur, initial encounter for closed fracture    Visit # / Visits Authorized:  1 / 24  Insurance Authorization Period: 4/22/2025 to 7/21/2025  Date of Evaluation:  4/17/2025   Plan of Care Certification:  4/17/2025 to 7/10/2025      Time In: 0936   Time Out: 1044  Total Time (in minutes): 68   Total Billable Time (in minutes): 68    FOTO:  Therapist reviewed FOTO scores for Andree Mcclelland on 5/5/2025.   FOTO report - see Media section or FOTO account episode details.      Intake: Patient's Physical FS Primary Measure: 13 (predicted 45 by visit #18)  Intake:Risk Adjusted Statistical FOTO: 41  Intake: Limitation Score: 81%  Intake: Category: Mobility  Intake: KEVIN Nino.:  25.1% functional     Intake Score: 13 (predicted 45 @ visit #18)%       Subjective   Andree reports that she has been having some hip pain and stiffness. Disappointed to took so long to get authorization for therapy visits..  Pain reported as 4/10.      Objective    Right Hip    Active (deg) Passive (deg) Pain   Flexion 90 95 Yes   Extension         ABduction 30 35 Yes   ADduction         External Rotation 90/90 35 38 Yes   External Rotation Prone         Internal Rotation 90/90 30 32 Yes   Internal Rotation Prone               Left Hip    Active (deg) Passive (deg) Pain   Flexion 100 105     Extension         ABduction 40 45     ADduction         External Rotation 90/90 40 42     External Rotation Prone        "  Internal Rotation 90/90 40 45     Internal Rotation Prone             Hip Strength - Planes of Motion    Right Strength Right Pain Left Strength Left  Pain   Flexion (L2) 4- Yes 5     Extension     5     ABduction 3   5     ADduction 4- Yes 5     Internal Rotation 4- Yes 5     External Rotation 4-   5              Treatment:  Therapeutic Exercise  TE 1: 44 mins; quad sets with glute sets, swiss ball KTC, swiss ball bridge, bridge, SAQ, SLR, hip abd, add ball squeeze, add ball squeeze KTC, clamshell, stand hip abd, stand hip flexion  Therapeutic Activity  TA 1: 24 mins; NuStep,6" step ups forward, 6" step ups lateral, lateral stepping along wall, retro stepping along wall    Time Entry(in minutes): 68  Therapeutic Activity Time Entry: 24  Therapeutic Exercise Time Entry: 44    Assessment & Plan   Assessment: Initiated the plan of care. Patient was given targeted exercises for hip, core and pelvic stability and she tolerated it fairly well. She does demonstrate poor to fair activity tolerance.  Evaluation/Treatment Tolerance: Patient limited by fatigue    Patient will continue to benefit from skilled outpatient physical therapy to address the deficits listed in the problem list box on initial evaluation, provide pt/family education and to maximize pt's level of independence in the home and community environment.     Patient's spiritual, cultural, and educational needs considered and patient agreeable to plan of care and goals.           Plan: Continue POC and progress as indicated toward set goals.    Goals:   Active       LTG's       Patient will report at least 20% disability reduction on KOOS Jr, to indicate clinically significant functional improvement       Start:  04/21/25    Expected End:  07/10/25            Patient will report at least 20 point increase on initial FOTO score to indicate clinically significant functional improvement         Start:  04/21/25    Expected End:  07/10/25            Patient will " demonstrate ability to perform all ADL's and  prolong activities or positions with pain of  2/10 or less       Start:  04/21/25    Expected End:  07/10/25            Patient will demonstrate improved quality of gait with use of assistive device.       Start:  04/21/25    Expected End:  07/10/25            Patient will be independent and compliant with final Home Exercise Program       Start:  04/21/25    Expected End:  07/10/25               STG's       Patient will report at least 10% disability reduction on KOOS Jr. to indicate clinically significant functional improvement       Start:  04/21/25    Expected End:  05/29/25            Patient will report at least 10 point increase on initial FOTO score to indicate clinically significant functional improvement         Start:  04/21/25    Expected End:  05/29/25            Patient will demonstrate ability to perform all ADL's and prolong activities or positions with pain no greater than 4/10       Start:  04/21/25    Expected End:  05/29/25            Patient will demonstrate independence with HEP       Start:  04/21/25    Expected End:  05/29/25            Patient will demonstrate improved strength of 1/2 grade with 8 point improvement on the TUG test to become less of a fall risk       Start:  04/21/25    Expected End:  05/29/25                Simone Davies, PT

## 2025-05-12 ENCOUNTER — HOSPITAL ENCOUNTER (OUTPATIENT)
Dept: RADIOLOGY | Facility: HOSPITAL | Age: 65
Discharge: HOME | End: 2025-05-12
Attending: PHYSICIAN ASSISTANT
Payer: MEDICARE

## 2025-05-12 ENCOUNTER — HOSPITAL ENCOUNTER (OUTPATIENT)
Dept: RADIOLOGY | Facility: HOSPITAL | Age: 65
Discharge: HOME | End: 2025-05-12
Attending: FAMILY MEDICINE
Payer: MEDICARE

## 2025-05-12 ENCOUNTER — DOCUMENT SCAN (OUTPATIENT)
Dept: HOME HEALTH SERVICES | Facility: HOSPITAL | Age: 65
End: 2025-05-12
Payer: MEDICARE

## 2025-05-12 DIAGNOSIS — M54.16 LUMBAR RADICULOPATHY: ICD-10-CM

## 2025-05-12 DIAGNOSIS — Z12.31 ENCOUNTER FOR SCREENING MAMMOGRAM FOR MALIGNANT NEOPLASM OF BREAST: ICD-10-CM

## 2025-05-12 PROCEDURE — 72148 MRI LUMBAR SPINE W/O DYE: CPT | Mod: TC

## 2025-05-15 ENCOUNTER — CLINICAL SUPPORT (OUTPATIENT)
Dept: REHABILITATION | Facility: HOSPITAL | Age: 65
End: 2025-05-15
Payer: MEDICARE

## 2025-05-15 DIAGNOSIS — R29.6 FREQUENT FALLS: ICD-10-CM

## 2025-05-15 DIAGNOSIS — S72.111A CLOSED AVULSION FRACTURE OF GREATER TROCHANTER OF FEMUR, RIGHT, INITIAL ENCOUNTER: Primary | ICD-10-CM

## 2025-05-15 DIAGNOSIS — M62.81 GENERALIZED MUSCLE WEAKNESS: ICD-10-CM

## 2025-05-15 PROCEDURE — 97530 THERAPEUTIC ACTIVITIES: CPT

## 2025-05-15 PROCEDURE — 97110 THERAPEUTIC EXERCISES: CPT

## 2025-05-15 NOTE — PROGRESS NOTES
"  Outpatient Rehab    Physical Therapy Visit    Patient Name: Andree Mcclelland  MRN: [id number]  YOB: 1960  Encounter Date: 5/15/2025    Therapy Diagnosis:   Encounter Diagnoses   Name Primary?    Closed avulsion fracture of greater trochanter of femur, right, initial encounter Yes    Generalized muscle weakness     Frequent falls      Physician: Familia De Jesus PA-C    Physician Orders: Eval and Treat  Medical Diagnosis: Displaced fracture of greater trochanter of right femur, initial encounter for closed fracture    Visit # / Visits Authorized:  2 / 24  Insurance Authorization Period: 4/22/2025 to 7/21/2025  Date of Evaluation: 4/17/2025  Plan of Care Certification: 4/17/2025 to 7/10/2025      Time In: 0948   Time Out: 1030  Total Time (in minutes): 42   Total Billable Time (in minutes):      FOTO:  Intake Score: 13 (predicted 45 at visit #18)%  Survey Score 2:  %  Survey Score 3:  %    Precautions:       Subjective   Andree reports she is still having some general soreness and had increased inner thigh soreness following last session. Had MRI for her back as she states she has "curved spine and sway back" but is still awaiting results. Has been relatively inactive at home since fall in March. Waiting on pool to warm up before starting to exercise in it as recommended..  Pain reported as 5/10. more of a soreness overall hip.    Objective            Treatment:  Therapeutic Exercise  TE 1: 25 mins; quad sets with glute sets, swiss ball KTC, swiss ball bridge, bridge, SAQ, SLR, hip abd, add ball squeeze, add ball squeeze KTC, clamshell, stand hip abd, stand hip flexion  Therapeutic Activity  TA 1: 17 mins; NuStep,6" step ups forward, 6" step ups lateral, lateral stepping along wall, retro stepping along wall    Time Entry(in minutes):  Therapeutic Activity Time Entry: 17  Therapeutic Exercise Time Entry: 25    Assessment & Plan   Assessment: Continued prior treatment plan. Pt requesting not to add weights " to exercises today, will progress as tolerated as she does demonstrate poor to fair activity tolerance.  Evaluation/Treatment Tolerance: Patient limited by fatigue    Patient will continue to benefit from skilled outpatient physical therapy to address the deficits listed in the problem list box on initial evaluation, provide pt/family education and to maximize pt's level of independence in the home and community environment.     Patient's spiritual, cultural, and educational needs considered and patient agreeable to plan of care and goals.           Plan: Continue POC and progress as indicated toward set goals.    Goals:   Active       LTG's       Patient will report at least 20% disability reduction on KOOS Jr, to indicate clinically significant functional improvement (Progressing)       Start:  04/21/25    Expected End:  07/10/25            Patient will report at least 20 point increase on initial FOTO score to indicate clinically significant functional improvement   (Progressing)       Start:  04/21/25    Expected End:  07/10/25            Patient will demonstrate ability to perform all ADL's and  prolong activities or positions with pain of  2/10 or less (Progressing)       Start:  04/21/25    Expected End:  07/10/25            Patient will demonstrate improved quality of gait with use of assistive device. (Progressing)       Start:  04/21/25    Expected End:  07/10/25            Patient will be independent and compliant with final Home Exercise Program (Progressing)       Start:  04/21/25    Expected End:  07/10/25               STG's       Patient will report at least 10% disability reduction on KOOS Jr. to indicate clinically significant functional improvement (Progressing)       Start:  04/21/25    Expected End:  05/29/25            Patient will report at least 10 point increase on initial FOTO score to indicate clinically significant functional improvement   (Progressing)       Start:  04/21/25    Expected  End:  05/29/25            Patient will demonstrate ability to perform all ADL's and prolong activities or positions with pain no greater than 4/10 (Progressing)       Start:  04/21/25    Expected End:  05/29/25            Patient will demonstrate independence with HEP (Progressing)       Start:  04/21/25    Expected End:  05/29/25            Patient will demonstrate improved strength of 1/2 grade with 8 point improvement on the TUG test to become less of a fall risk (Progressing)       Start:  04/21/25    Expected End:  05/29/25                Jose J Bianchi, PT

## 2025-05-20 ENCOUNTER — RESULTS FOLLOW-UP (OUTPATIENT)
Dept: ORTHOPEDICS | Facility: CLINIC | Age: 65
End: 2025-05-20

## 2025-05-20 ENCOUNTER — TELEPHONE (OUTPATIENT)
Dept: ORTHOPEDICS | Facility: CLINIC | Age: 65
End: 2025-05-20
Payer: MEDICARE

## 2025-05-20 ENCOUNTER — CLINICAL SUPPORT (OUTPATIENT)
Dept: REHABILITATION | Facility: HOSPITAL | Age: 65
End: 2025-05-20
Payer: MEDICARE

## 2025-05-20 DIAGNOSIS — M51.362 DEGENERATION OF INTERVERTEBRAL DISC OF LUMBAR REGION WITH DISCOGENIC BACK PAIN AND LOWER EXTREMITY PAIN: ICD-10-CM

## 2025-05-20 DIAGNOSIS — M62.81 GENERALIZED MUSCLE WEAKNESS: ICD-10-CM

## 2025-05-20 DIAGNOSIS — M54.9 BACK PAIN, UNSPECIFIED BACK LOCATION, UNSPECIFIED BACK PAIN LATERALITY, UNSPECIFIED CHRONICITY: Primary | ICD-10-CM

## 2025-05-20 DIAGNOSIS — M54.16 LUMBAR RADICULOPATHY: ICD-10-CM

## 2025-05-20 DIAGNOSIS — Z74.09 IMPAIRED FUNCTIONAL MOBILITY, BALANCE, GAIT, AND ENDURANCE: ICD-10-CM

## 2025-05-20 DIAGNOSIS — S72.111A CLOSED AVULSION FRACTURE OF GREATER TROCHANTER OF FEMUR, RIGHT, INITIAL ENCOUNTER: Primary | ICD-10-CM

## 2025-05-20 DIAGNOSIS — R29.6 FREQUENT FALLS: ICD-10-CM

## 2025-05-20 PROCEDURE — 97530 THERAPEUTIC ACTIVITIES: CPT

## 2025-05-20 PROCEDURE — 97110 THERAPEUTIC EXERCISES: CPT

## 2025-05-20 NOTE — PROGRESS NOTES
Outpatient Rehab    Physical Therapy Progress Note    Patient Name: Andree Mcclelland  MRN: [id number]  YOB: 1960  Encounter Date: 5/20/2025    Therapy Diagnosis:   Encounter Diagnoses   Name Primary?    Closed avulsion fracture of greater trochanter of femur, right, initial encounter Yes    Generalized muscle weakness     Frequent falls     Impaired functional mobility, balance, gait, and endurance      Physician: Familia De Jesus PA-C    Physician Orders: Eval and Treat  Medical Diagnosis: Displaced fracture of greater trochanter of right femur, initial encounter for closed fracture    Visit # / Visits Authorized:  3 / 24  Insurance Authorization Period: 4/22/2025 to 7/21/2025  Date of Evaluation: 4/17/2025  Plan of Care Certification: 4/17/2025 to 7/10/2025      Time In: 0949   Time Out: 1042  Total Time (in minutes): 53   Total Billable Time (in minutes): 53    FOTO:  Therapist reviewed FOTO scores for Andree Mcclelland on 5/5/2025.   FOTO report - see Media section or FOTO account episode details.      Intake: Patient's Physical FS Primary Measure: 13 (predicted 45 by visit #18)  Intake:Risk Adjusted Statistical FOTO: 41  Intake: Limitation Score: 81%  Intake: Category: Mobility  Intake: HOOS Jr.:  25.1% functional     Intake Score: 13 (predicted 45 @ visit #18)%      Precautions: none       Subjective   Andree reporting continued inner thigh pain/soreness. Got results of her MRI and states that she has spondylosis and some narrowing..  Pain reported as 5/10.      Objective       Hip Range of Motion   Right Hip   Active (deg) Passive (deg) Pain   Flexion 95 100 Yes   Extension         ABduction 32 35 Yes   ADduction         External Rotation 90/90 38 40 Yes   External Rotation Prone         Internal Rotation 90/90 34 36 Yes   Internal Rotation Prone                         Treatment:  Therapeutic Exercise  TE 1: 35 mins; quad sets with glute sets, swiss ball KTC, swiss ball bridge, bridge, SAQ, SLR, hip  "abd, add ball squeeze, add ball squeeze KTC, clamshell, stand hip abd, stand hip flexion  Therapeutic Activity  TA 1: 18 mins; NuStep,6" step ups forward, 6" step ups lateral, lateral stepping along wall, retro stepping along wall    Time Entry(in minutes):  Therapeutic Activity Time Entry: 18  Therapeutic Exercise Time Entry: 35    Assessment & Plan   Assessment: Continued plan of care. Patient stated that she was not ready to add weights so we held off.  Had better perfromance with all execises today. She is walking a little better. Measures were done and she demonstrates some improved hip ROM with a lowe degree of pain. Will progress as tolerated as she does demonstrate poor to fair activity tolerance.  Evaluation/Treatment Tolerance: Patient limited by fatigue    Patient will continue to benefit from skilled outpatient physical therapy to address the deficits listed in the problem list box on initial evaluation, provide pt/family education and to maximize pt's level of independence in the home and community environment.     Patient's spiritual, cultural, and educational needs considered and patient agreeable to plan of care and goals.           Plan: Continue POC and progress as indicated toward set goals.    Goals:   Active       LTG's       Patient will report at least 20% disability reduction on KOOS Jr, to indicate clinically significant functional improvement (Progressing)       Start:  04/21/25    Expected End:  07/10/25            Patient will report at least 20 point increase on initial FOTO score to indicate clinically significant functional improvement   (Progressing)       Start:  04/21/25    Expected End:  07/10/25            Patient will demonstrate ability to perform all ADL's and  prolong activities or positions with pain of  2/10 or less (Progressing)       Start:  04/21/25    Expected End:  07/10/25            Patient will demonstrate improved quality of gait with use of assistive device. " (Progressing)       Start:  04/21/25    Expected End:  07/10/25            Patient will be independent and compliant with final Home Exercise Program (Progressing)       Start:  04/21/25    Expected End:  07/10/25               STG's       Patient will report at least 10% disability reduction on KOOS Jr. to indicate clinically significant functional improvement (Progressing)       Start:  04/21/25    Expected End:  05/29/25            Patient will report at least 10 point increase on initial FOTO score to indicate clinically significant functional improvement   (Progressing)       Start:  04/21/25    Expected End:  05/29/25            Patient will demonstrate ability to perform all ADL's and prolong activities or positions with pain no greater than 4/10 (Progressing)       Start:  04/21/25    Expected End:  05/29/25            Patient will demonstrate independence with HEP (Progressing)       Start:  04/21/25    Expected End:  05/29/25            Patient will demonstrate improved strength of 1/2 grade with 8 point improvement on the TUG test to become less of a fall risk (Progressing)       Start:  04/21/25    Expected End:  05/29/25                Simone Davies PT

## 2025-05-20 NOTE — TELEPHONE ENCOUNTER
----- Message from Familia De Jesus PA-C sent at 5/20/2025 10:04 AM CDT -----  Lets charliemary an appointment for her with pain management   ----- Message -----  From: Interface, Rad Results In  Sent: 5/12/2025  11:15 AM CDT  To: Familia De Jesus PA-C

## 2025-05-20 NOTE — TELEPHONE ENCOUNTER
Attempted to call patient regarding referral to pain management. Patient did not answer. Unable to leave a voicemail

## 2025-05-21 NOTE — TELEPHONE ENCOUNTER
Called and spoke with Mrs. Candelario. She was informed that provider recommends she be seen by another provider for her back. She was informed that he reviewed her results. Patient is aware that the office of pain management will be reached out to set up an appointment.     Patient voiced understanding.

## 2025-08-29 DIAGNOSIS — F32.5 MAJOR DEPRESSION IN REMISSION: ICD-10-CM

## 2025-08-29 RX ORDER — ESCITALOPRAM OXALATE 10 MG/1
10 TABLET ORAL
Qty: 90 TABLET | Refills: 1 | Status: SHIPPED | OUTPATIENT
Start: 2025-08-29

## (undated) DEVICE — SOL IRRI STRL WATER 1000ML

## (undated) DEVICE — CLIP HEMOSTATIC 2.8MM 235CM

## (undated) DEVICE — Device

## (undated) DEVICE — TUBE SUCTION MEDI-VAC STERILE

## (undated) DEVICE — SNARE EXACTO COLD

## (undated) DEVICE — LINER SUCTION CANISTER (DISP)

## (undated) DEVICE — SNARE CAPTIVATOR II - 15MM

## (undated) DEVICE — ADAPTER AIR/WATER CHANNEL CLEA

## (undated) DEVICE — KIT SURGICAL COLON .25 1.1OZ